# Patient Record
Sex: MALE | NOT HISPANIC OR LATINO | URBAN - METROPOLITAN AREA
[De-identification: names, ages, dates, MRNs, and addresses within clinical notes are randomized per-mention and may not be internally consistent; named-entity substitution may affect disease eponyms.]

---

## 2019-02-01 PROBLEM — Z00.00 ENCOUNTER FOR PREVENTIVE HEALTH EXAMINATION: Status: ACTIVE | Noted: 2019-02-01

## 2019-02-05 ENCOUNTER — EMERGENCY (EMERGENCY)
Facility: HOSPITAL | Age: 54
LOS: 1 days | Discharge: HOME | End: 2019-02-05
Attending: EMERGENCY MEDICINE | Admitting: EMERGENCY MEDICINE
Payer: MEDICAID

## 2019-02-05 VITALS
RESPIRATION RATE: 16 BRPM | HEART RATE: 72 BPM | SYSTOLIC BLOOD PRESSURE: 123 MMHG | TEMPERATURE: 97 F | DIASTOLIC BLOOD PRESSURE: 70 MMHG | OXYGEN SATURATION: 100 %

## 2019-02-05 PROCEDURE — 99283 EMERGENCY DEPT VISIT LOW MDM: CPT | Mod: 25

## 2019-02-05 RX ORDER — CYCLOBENZAPRINE HYDROCHLORIDE 10 MG/1
1 TABLET, FILM COATED ORAL
Qty: 9 | Refills: 0
Start: 2019-02-05 | End: 2019-02-07

## 2019-02-05 RX ORDER — ACETAMINOPHEN 500 MG
975 TABLET ORAL ONCE
Qty: 0 | Refills: 0 | Status: COMPLETED | OUTPATIENT
Start: 2019-02-05 | End: 2019-02-05

## 2019-02-05 RX ORDER — METHOCARBAMOL 500 MG/1
1500 TABLET, FILM COATED ORAL ONCE
Qty: 0 | Refills: 0 | Status: COMPLETED | OUTPATIENT
Start: 2019-02-05 | End: 2019-02-05

## 2019-02-05 RX ORDER — LIDOCAINE 4 G/100G
1 CREAM TOPICAL
Qty: 5 | Refills: 0
Start: 2019-02-05 | End: 2019-02-09

## 2019-02-05 RX ORDER — LIDOCAINE 4 G/100G
1 CREAM TOPICAL ONCE
Qty: 0 | Refills: 0 | Status: COMPLETED | OUTPATIENT
Start: 2019-02-05 | End: 2019-02-05

## 2019-02-05 RX ORDER — IBUPROFEN 200 MG
1 TABLET ORAL
Qty: 20 | Refills: 0
Start: 2019-02-05 | End: 2019-02-09

## 2019-02-05 RX ORDER — METHOCARBAMOL 500 MG/1
500 TABLET, FILM COATED ORAL ONCE
Qty: 0 | Refills: 0 | Status: DISCONTINUED | OUTPATIENT
Start: 2019-02-05 | End: 2019-02-05

## 2019-02-05 RX ORDER — ACETAMINOPHEN 500 MG
2 TABLET ORAL
Qty: 24 | Refills: 0
Start: 2019-02-05 | End: 2019-02-07

## 2019-02-05 RX ADMIN — LIDOCAINE 1 PATCH: 4 CREAM TOPICAL at 04:31

## 2019-02-05 RX ADMIN — METHOCARBAMOL 1500 MILLIGRAM(S): 500 TABLET, FILM COATED ORAL at 04:40

## 2019-02-05 RX ADMIN — Medication 975 MILLIGRAM(S): at 04:31

## 2019-02-05 NOTE — ED PROVIDER NOTE - OBJECTIVE STATEMENT
53m pmh chronic back pain, pw acute back pain since 7pm. patient reports working as  at [employer] and standing up from a seated position and throwing out his back. Reports back pain in the thoracic area since. Denies chest pain, shortness of breath, n/v/d/c urinary retention, incontinence, neck stiffness, history of cancer, ivda, abdominal pain, weakness in extremities. Reports taking 1 ibuprofen with mild relief. Patient with son, who was not in room at time of eval. reports numbness of right forearm chronic unchanged this evening, last bm/urine right before eval

## 2019-02-05 NOTE — ED ADULT NURSE NOTE - NSIMPLEMENTINTERV_GEN_ALL_ED
Implemented All Universal Safety Interventions:  [place] to call system. Call bell, personal items and telephone within reach. Instruct patient to call for assistance. Room bathroom lighting operational. Non-slip footwear when patient is off stretcher. Physically safe environment: no spills, clutter or unnecessary equipment. Stretcher in lowest position, wheels locked, appropriate side rails in place.

## 2019-02-05 NOTE — ED PROVIDER NOTE - PROGRESS NOTE DETAILS
Patient feels well now, tolerating PO. Discussed lab findings with patient. Patient feels comfortable going home. Gave home care and follow up instructions. Discussed which symptoms to look out for and when to return to the ED for further evaluation. Patient given opportunity to ask questions about their medical condition and had all questions answered. sending medications to pharmacy.

## 2019-02-05 NOTE — ED PROVIDER NOTE - NS ED ROS FT
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
## 2019-02-05 NOTE — ED PROVIDER NOTE - MEDICAL DECISION MAKING DETAILS
53m The patient presents with acute onset of back pain after standing today, I believe this patient can be ruled out for serious pathology given there is a completely normal neurological exam, no history of IV drug use, and no history of bowel or bladder incontinence. Analgesia, reassess

## 2019-02-08 ENCOUNTER — APPOINTMENT (OUTPATIENT)
Dept: ORTHOPEDIC SURGERY | Facility: CLINIC | Age: 54
End: 2019-02-08

## 2022-10-29 ENCOUNTER — INPATIENT (INPATIENT)
Facility: HOSPITAL | Age: 57
LOS: 7 days | Discharge: ANOTHER HOSPITAL | End: 2022-11-06
Attending: STUDENT IN AN ORGANIZED HEALTH CARE EDUCATION/TRAINING PROGRAM | Admitting: STUDENT IN AN ORGANIZED HEALTH CARE EDUCATION/TRAINING PROGRAM

## 2022-10-29 VITALS
SYSTOLIC BLOOD PRESSURE: 106 MMHG | DIASTOLIC BLOOD PRESSURE: 60 MMHG | TEMPERATURE: 98 F | RESPIRATION RATE: 18 BRPM | OXYGEN SATURATION: 100 % | HEART RATE: 66 BPM

## 2022-10-29 LAB
ALBUMIN SERPL ELPH-MCNC: 4.1 G/DL — SIGNIFICANT CHANGE UP (ref 3.3–5)
ALP SERPL-CCNC: 61 U/L — SIGNIFICANT CHANGE UP (ref 40–120)
ALT FLD-CCNC: 21 U/L — SIGNIFICANT CHANGE UP (ref 4–41)
ANION GAP SERPL CALC-SCNC: 9 MMOL/L — SIGNIFICANT CHANGE UP (ref 7–14)
APTT BLD: 30.1 SEC — SIGNIFICANT CHANGE UP (ref 27–36.3)
AST SERPL-CCNC: 23 U/L — SIGNIFICANT CHANGE UP (ref 4–40)
BASOPHILS # BLD AUTO: 0.04 K/UL — SIGNIFICANT CHANGE UP (ref 0–0.2)
BASOPHILS NFR BLD AUTO: 0.6 % — SIGNIFICANT CHANGE UP (ref 0–2)
BILIRUB SERPL-MCNC: 0.4 MG/DL — SIGNIFICANT CHANGE UP (ref 0.2–1.2)
BUN SERPL-MCNC: 16 MG/DL — SIGNIFICANT CHANGE UP (ref 7–23)
CALCIUM SERPL-MCNC: 9.3 MG/DL — SIGNIFICANT CHANGE UP (ref 8.4–10.5)
CHLORIDE SERPL-SCNC: 105 MMOL/L — SIGNIFICANT CHANGE UP (ref 98–107)
CO2 SERPL-SCNC: 26 MMOL/L — SIGNIFICANT CHANGE UP (ref 22–31)
CREAT SERPL-MCNC: 0.9 MG/DL — SIGNIFICANT CHANGE UP (ref 0.5–1.3)
CRP SERPL-MCNC: <3 MG/L — SIGNIFICANT CHANGE UP
EGFR: 100 ML/MIN/1.73M2 — SIGNIFICANT CHANGE UP
EOSINOPHIL # BLD AUTO: 0.15 K/UL — SIGNIFICANT CHANGE UP (ref 0–0.5)
EOSINOPHIL NFR BLD AUTO: 2.1 % — SIGNIFICANT CHANGE UP (ref 0–6)
ERYTHROCYTE [SEDIMENTATION RATE] IN BLOOD: 7 MM/HR — SIGNIFICANT CHANGE UP (ref 1–15)
FLUAV AG NPH QL: SIGNIFICANT CHANGE UP
FLUBV AG NPH QL: SIGNIFICANT CHANGE UP
GLUCOSE SERPL-MCNC: 90 MG/DL — SIGNIFICANT CHANGE UP (ref 70–99)
HCT VFR BLD CALC: 41.8 % — SIGNIFICANT CHANGE UP (ref 39–50)
HGB BLD-MCNC: 14 G/DL — SIGNIFICANT CHANGE UP (ref 13–17)
IANC: 3.55 K/UL — SIGNIFICANT CHANGE UP (ref 1.8–7.4)
IMM GRANULOCYTES NFR BLD AUTO: 0.6 % — SIGNIFICANT CHANGE UP (ref 0–0.9)
INR BLD: 1.04 RATIO — SIGNIFICANT CHANGE UP (ref 0.88–1.16)
LYMPHOCYTES # BLD AUTO: 2.69 K/UL — SIGNIFICANT CHANGE UP (ref 1–3.3)
LYMPHOCYTES # BLD AUTO: 37.5 % — SIGNIFICANT CHANGE UP (ref 13–44)
MAGNESIUM SERPL-MCNC: 2 MG/DL — SIGNIFICANT CHANGE UP (ref 1.6–2.6)
MCHC RBC-ENTMCNC: 28.2 PG — SIGNIFICANT CHANGE UP (ref 27–34)
MCHC RBC-ENTMCNC: 33.5 GM/DL — SIGNIFICANT CHANGE UP (ref 32–36)
MCV RBC AUTO: 84.1 FL — SIGNIFICANT CHANGE UP (ref 80–100)
MONOCYTES # BLD AUTO: 0.7 K/UL — SIGNIFICANT CHANGE UP (ref 0–0.9)
MONOCYTES NFR BLD AUTO: 9.8 % — SIGNIFICANT CHANGE UP (ref 2–14)
NEUTROPHILS # BLD AUTO: 3.55 K/UL — SIGNIFICANT CHANGE UP (ref 1.8–7.4)
NEUTROPHILS NFR BLD AUTO: 49.4 % — SIGNIFICANT CHANGE UP (ref 43–77)
NRBC # BLD: 0 /100 WBCS — SIGNIFICANT CHANGE UP (ref 0–0)
NRBC # FLD: 0 K/UL — SIGNIFICANT CHANGE UP (ref 0–0)
PHOSPHATE SERPL-MCNC: 3.2 MG/DL — SIGNIFICANT CHANGE UP (ref 2.5–4.5)
PLATELET # BLD AUTO: 254 K/UL — SIGNIFICANT CHANGE UP (ref 150–400)
POTASSIUM SERPL-MCNC: 4.7 MMOL/L — SIGNIFICANT CHANGE UP (ref 3.5–5.3)
POTASSIUM SERPL-SCNC: 4.7 MMOL/L — SIGNIFICANT CHANGE UP (ref 3.5–5.3)
PROT SERPL-MCNC: 6.9 G/DL — SIGNIFICANT CHANGE UP (ref 6–8.3)
PROTHROM AB SERPL-ACNC: 12.1 SEC — SIGNIFICANT CHANGE UP (ref 10.5–13.4)
RBC # BLD: 4.97 M/UL — SIGNIFICANT CHANGE UP (ref 4.2–5.8)
RBC # FLD: 14.6 % — HIGH (ref 10.3–14.5)
RSV RNA NPH QL NAA+NON-PROBE: SIGNIFICANT CHANGE UP
SARS-COV-2 RNA SPEC QL NAA+PROBE: SIGNIFICANT CHANGE UP
SODIUM SERPL-SCNC: 140 MMOL/L — SIGNIFICANT CHANGE UP (ref 135–145)
WBC # BLD: 7.17 K/UL — SIGNIFICANT CHANGE UP (ref 3.8–10.5)
WBC # FLD AUTO: 7.17 K/UL — SIGNIFICANT CHANGE UP (ref 3.8–10.5)

## 2022-10-29 PROCEDURE — 72128 CT CHEST SPINE W/O DYE: CPT | Mod: 26,MA

## 2022-10-29 PROCEDURE — 70450 CT HEAD/BRAIN W/O DYE: CPT | Mod: 26,MA

## 2022-10-29 PROCEDURE — 99285 EMERGENCY DEPT VISIT HI MDM: CPT

## 2022-10-29 PROCEDURE — 72131 CT LUMBAR SPINE W/O DYE: CPT | Mod: 26,MA

## 2022-10-29 PROCEDURE — 72125 CT NECK SPINE W/O DYE: CPT | Mod: 26,MA

## 2022-10-29 RX ORDER — DIAZEPAM 5 MG
5 TABLET ORAL ONCE
Refills: 0 | Status: DISCONTINUED | OUTPATIENT
Start: 2022-10-29 | End: 2022-10-29

## 2022-10-29 NOTE — ED PROVIDER NOTE - PHYSICAL EXAMINATION
GENERAL: well appearing in no acute distress, non-toxic appearing  HEENT: normal conjunctiva, oral mucosa moist, uvula midline, no tonsilar exudates, +left facial droop  CARDIAC: regular rate and rhythm, normal S1S2, no appreciable murmurs, 2+ pulses in UE/LE b/l  PULM: normal breath sounds, clear to ascultation bilaterally, no rales, rhonchi, wheezing  GI: Abd soft, nondistended, nontender, no rebound tenderness, no guarding, no rigidity  NEURO: No focal sensory deficits, possible R sided 4/5 strength L 5/5, uncbalanced gait, dragging left foot., AAOx3  MSK: ROM intact, no peripheral edema, no calf tenderness b/l  SKIN: well-perfused, extremities warm, no visible rashes  PSYCH: appropriate mood and affect

## 2022-10-29 NOTE — ED ADULT NURSE REASSESSMENT NOTE - NS ED NURSE REASSESS COMMENT FT1
Pt continues to refuse MRI and Valium medication. Respirations even and unlabored. No apparent distress, appears laying in bed with HOB elevated. NSR on bedside cardiac monitor. Bed in lowest position, call bell in reach, wheels locked, side rails up, safety maintained. Awaiting further orders.

## 2022-10-29 NOTE — ED ADULT NURSE NOTE - OBJECTIVE STATEMENT
Break coverage RN: 58 yo male A&Ox4, ambulatory with PHX: R shoulder surgery S/P workplace injury (fall in 2019) C/O left sided weakness/numbness/tingling x 3 days. PT states symptoms are traumatic, noticed 3 days ago having tingling sensation to left hand/fingers with weakness in LUE; also C/O difficulty ambulating, with left foot "feeling like it isn't there". Denies any pain, SOB, falls, injuries, trauma. PT appears to have left sided facial droop, left UE sensory deficit, B/L LE weakness. Awaiting MD evaluation at this time and further orders. Call bell within reach, comfort measures provided. ELLIE Medina R.N.
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
## 2022-10-29 NOTE — ED ADULT NURSE NOTE - NS ED NURSE PATIENT LEFT UNIT TIME
Mid-Level Procedure Text (A): After obtaining clear surgical margins the patient was sent to a mid-level provider for surgical repair.  The patient understands they will receive post-surgical care and follow-up from the mid-level provider. 01:06

## 2022-10-29 NOTE — CONSULT NOTE ADULT - ASSESSMENT
INCOMPLETE      Impression: Right side weakness, residual from prior. Left side strength appears intact on physical exam. Patient does have difficulty with gait, which he states is new for him.     Recommend:  [] consider MRI T- spine with extension down to conus medullaris if patient can tolerate to rule out spinal etiology for patient gait changes. Patient also complaining of midline spinal TTP in thoracic area.  Likely no need to image below conus and patient not presenting with new pain symptoms in legs and not complaining of urinary symptoms or saddle anesthesia.  [] CBC, CMP, Mg, Ph, TSH, T3, Free T4, Vitamins B1, B6, B9, B12, D3, Vit E, copper, zinc, CK, ESR, CRP, syphilis, lyme  [] outpatient EMG/NC studies with outpatient neurology   58 yo M, with Hx of chronic back and neck pain from spine injury 2019, R shoulder surgery after a workplace injury due to a fall in 2019, (Feb 2019: was working as a  in [employer], stood up from seated position, and threw out his back, has had pain in thoracic back area radiating down right back and right groin to right leg with lightening-like pain since then; also reported numbness of R forearm, chronic.)  Patient reports he has been having new onset numbness in the Left hand from the wrist to fingers, since 3 days. He reports loss of sensation in his fingers when trying to hold something or zipping up pants for example. He is complaining of Left foot numbness and paresthesias from the ankle down. He states he is dragging his left foot/left foot is slapping the ground, or not lifting enough and stamping the ground too hard. He endorses injury to his spine in 2019, with residual R side weakness; His right hand cannot  objects properly and drops things. He has contractures in the R hand.     Impression: Right side weakness, residual from prior. Left side strength appears intact on physical exam. Patient denies pain in the LUE and LLE, does endorse numbness and tingling. LUE seems to be intact; and RUE deficits seem to be chronic. Patient does have difficulty with gait, which he states is new for him, possibly L foot drop due to peroneal nerve injury/compression. Other differentials for distal sensory disturbance include metabolic causes such as vitamin deficiencies or diabetes.    Recommend:  [] consider MRI T- spine with extension down to conus medullaris if patient can tolerate to rule out spinal etiology for patient gait changes. Patient also complaining of midline spinal TTP in thoracic area.  Likely no need to image below conus and patient not presenting with new pain symptoms in legs and not complaining of urinary symptoms or saddle anesthesia, so less likely to involve cauda equine.  [] CBC, CMP, Mg, Ph, TSH, T3, Free T4, A1c, LDL, Vitamins B1, B6, B9, B12, D3, Vit E, copper, zinc, CK, ESR, CRP, syphilis, lyme  [] outpatient EMG/NCS studies with outpatient neurology  [] PT/OT evaluation    Patient to be seen during AM neurology rounds.  Recommendations finalized once attested by attending.

## 2022-10-29 NOTE — ED PROVIDER NOTE - ATTENDING CONTRIBUTION TO CARE
The patient is a 57y Male who has no pertinent past medical history PTED with Pt with HX of back/spine  injury 2019.  Pt reports over the past 3 days developed tingling to left hand and foot with weakness to left leg  states has to drag it. pt denies any  urinary incontinence or pain. 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

## 2022-10-29 NOTE — ED PROVIDER NOTE - OBJECTIVE STATEMENT
57M w/ h/o of chronic neck and back pain s/p spine + R shoulder surgery 2019, presents with 3 days of LLE weakness, LUE numbness, Left facial droop, left sided headache. Pt felt weak 3 days ago, could not stand or balance well, and when walking would drag his left foot. In his left hand his fingertips are numb, decribes difficulty zippering his pants. Headache is left sided with dull character, and assoc left facial droop. Denies any trauma, fever/chills, incontinence, saddle anesthesia, dizziness, CP, SOB.

## 2022-10-29 NOTE — ED ADULT NURSE REASSESSMENT NOTE - NS ED NURSE REASSESS COMMENT FT1
pt lying comfortably in bed at this time. respirations even and unlabored, completing full sentences. pt states "I have claustrophobia and could not do MRIs in the past, it feels tight." MD ordered 5mg valium, pt refused.

## 2022-10-29 NOTE — ED PROVIDER NOTE - CLINICAL SUMMARY MEDICAL DECISION MAKING FREE TEXT BOX
57M w/ h/o of chronic neck and back pain s/p spine + R shoulder surgery 2019, presents with 3 days of LLE weakness, LUE numbness, Left facial droop, left sided headache. Denies trauma, fall, fever/chill, incontinence, saddle anesthesia. AVSS, neuro exam per PE. brain mets vs spinal lesion vs metabolic,  will get labs, UA, CT, +/- MRI, Neuro, give meds and reassess.

## 2022-10-29 NOTE — ED ADULT NURSE NOTE - INTERVENTIONS DEFINITIONS
[place] to call system/Call bell, personal items and telephone within reach/Instruct patient to call for assistance/Non-slip footwear when patient is off stretcher/Physically safe environment: no spills, clutter or unnecessary equipment/Stretcher in lowest position, wheels locked, appropriate side rails in place/Provide visual clues: red socks

## 2022-10-29 NOTE — ED ADULT NURSE NOTE - NS ED NOTE ABUSE RESPONSE YN
Sumi Zuniga from estimate department  With [place]  Patient is requesting estimate.    Sumi Zuniga needs to confirm the correct code for photo workman [id number] or [id number]. [phone]    Routed to Rosemarie's team Yes

## 2022-10-29 NOTE — ED ADULT NURSE REASSESSMENT NOTE - NS ED NURSE REASSESS COMMENT FT1
Report received from day RN Keshia. A&Ox4 and ambulatory. C/o left sided neck discomfort. MD resident Thornton made aware. Respirations even and unlabored. No apparent distress noted, pt appears laying in bed with HOB elevated. Denies CP, SOB, vision changes, dizziness, lightheadedness, nausea, vomiting, fever or chills. Speaking in full and complete sentences. Right sided arm and leg weakness noted, pt states this is baseline since 2019 spinal injury. No facial droop noted. NSR on bedside cardiac monitor. Pt refusing MRI due to fear of claustrophobia. MD Thornton made aware. Bed in lowest position, call bell in reach, wheels locked, side rails up, safety maintained. Awaiting further orders. Report received from day RN Keshia. A&Ox4 and ambulatory. C/o left sided neck discomfort. MD resident Thornton made aware. Respirations even and unlabored. No apparent distress noted, pt appears laying in bed with HOB elevated. Denies CP, SOB, vision changes, dizziness, lightheadedness, nausea, vomiting, fever or chills. Speaking in full and complete sentences. Right sided arm and leg weakness noted, pt states this is baseline since 2019 spinal injury. No facial droop noted. NSR on bedside cardiac monitor. Pt refusing MRI due to fear of claustrophobia, also refusing medication. MD Thornton made aware. Bed in lowest position, call bell in reach, wheels locked, side rails up, safety maintained. Awaiting further orders.

## 2022-10-29 NOTE — ED ADULT NURSE REASSESSMENT NOTE - NS ED NURSE REASSESS COMMENT FT1
report received from [facility] coverage RN. respirations even and unlabored, pt at baseline mental status. assisted pt in standing up to use urinal. pt steady when standing. sitting in bed comfortably, no complaints at this time. NSR on cardiac monitor. awaiting CT.

## 2022-10-29 NOTE — ED ADULT NURSE REASSESSMENT NOTE - NS ED NURSE REASSESS COMMENT FT1
Pt appears comfortable laying in bed with HOB elevated. No complaints. No apparent distress noted. Respirations even and unlabored. Pt speaking in full and complete sentences. NSR on bedside cardiac monitor. V/S charted. Pt continues to refuse MRI. Bed in lowest position, call bell in reach, wheels locked, side rails up, safety maintained. Awaiting further orders.

## 2022-10-29 NOTE — ED ADULT TRIAGE NOTE - CHIEF COMPLAINT QUOTE
Pt with HX of back/spine  injury 2019.  Pt reports over the past 3 days developed tingling to left hand and foot with weakness to left leg  states has to drag it. pt denies any  urinary incontinence or pain. .

## 2022-10-30 DIAGNOSIS — M21.372 FOOT DROP, LEFT FOOT: ICD-10-CM

## 2022-10-30 DIAGNOSIS — R20.0 ANESTHESIA OF SKIN: ICD-10-CM

## 2022-10-30 DIAGNOSIS — M54.9 DORSALGIA, UNSPECIFIED: ICD-10-CM

## 2022-10-30 DIAGNOSIS — Z29.9 ENCOUNTER FOR PROPHYLACTIC MEASURES, UNSPECIFIED: ICD-10-CM

## 2022-10-30 LAB
A1C WITH ESTIMATED AVERAGE GLUCOSE RESULT: 5.3 % — SIGNIFICANT CHANGE UP (ref 4–5.6)
ANION GAP SERPL CALC-SCNC: 12 MMOL/L — SIGNIFICANT CHANGE UP (ref 7–14)
BUN SERPL-MCNC: 19 MG/DL — SIGNIFICANT CHANGE UP (ref 7–23)
CALCIUM SERPL-MCNC: 9.2 MG/DL — SIGNIFICANT CHANGE UP (ref 8.4–10.5)
CHLORIDE SERPL-SCNC: 102 MMOL/L — SIGNIFICANT CHANGE UP (ref 98–107)
CK SERPL-CCNC: 259 U/L — HIGH (ref 30–200)
CO2 SERPL-SCNC: 22 MMOL/L — SIGNIFICANT CHANGE UP (ref 22–31)
CREAT SERPL-MCNC: 0.86 MG/DL — SIGNIFICANT CHANGE UP (ref 0.5–1.3)
EGFR: 101 ML/MIN/1.73M2 — SIGNIFICANT CHANGE UP
ESTIMATED AVERAGE GLUCOSE: 105 — SIGNIFICANT CHANGE UP
FOLATE SERPL-MCNC: 12 NG/ML — SIGNIFICANT CHANGE UP (ref 3.1–17.5)
GLUCOSE SERPL-MCNC: 91 MG/DL — SIGNIFICANT CHANGE UP (ref 70–99)
HCT VFR BLD CALC: 44.1 % — SIGNIFICANT CHANGE UP (ref 39–50)
HCV AB S/CO SERPL IA: 0.14 S/CO — SIGNIFICANT CHANGE UP (ref 0–0.99)
HCV AB SERPL-IMP: SIGNIFICANT CHANGE UP
HGB BLD-MCNC: 14.9 G/DL — SIGNIFICANT CHANGE UP (ref 13–17)
LDLC SERPL DIRECT ASSAY-MCNC: 154 MG/DL — SIGNIFICANT CHANGE UP (ref 73–160)
MCHC RBC-ENTMCNC: 28.6 PG — SIGNIFICANT CHANGE UP (ref 27–34)
MCHC RBC-ENTMCNC: 33.8 GM/DL — SIGNIFICANT CHANGE UP (ref 32–36)
MCV RBC AUTO: 84.6 FL — SIGNIFICANT CHANGE UP (ref 80–100)
NRBC # BLD: 0 /100 WBCS — SIGNIFICANT CHANGE UP (ref 0–0)
NRBC # FLD: 0 K/UL — SIGNIFICANT CHANGE UP (ref 0–0)
PLATELET # BLD AUTO: 245 K/UL — SIGNIFICANT CHANGE UP (ref 150–400)
POTASSIUM SERPL-MCNC: 4 MMOL/L — SIGNIFICANT CHANGE UP (ref 3.5–5.3)
POTASSIUM SERPL-SCNC: 4 MMOL/L — SIGNIFICANT CHANGE UP (ref 3.5–5.3)
RBC # BLD: 5.21 M/UL — SIGNIFICANT CHANGE UP (ref 4.2–5.8)
RBC # FLD: 14.6 % — HIGH (ref 10.3–14.5)
SODIUM SERPL-SCNC: 136 MMOL/L — SIGNIFICANT CHANGE UP (ref 135–145)
T4 AB SER-ACNC: 7.65 UG/DL — SIGNIFICANT CHANGE UP (ref 5.1–13)
TSH SERPL-MCNC: 3.93 UIU/ML — SIGNIFICANT CHANGE UP (ref 0.27–4.2)
VIT B12 SERPL-MCNC: 342 PG/ML — SIGNIFICANT CHANGE UP (ref 200–900)
WBC # BLD: 8.13 K/UL — SIGNIFICANT CHANGE UP (ref 3.8–10.5)
WBC # FLD AUTO: 8.13 K/UL — SIGNIFICANT CHANGE UP (ref 3.8–10.5)

## 2022-10-30 PROCEDURE — 99223 1ST HOSP IP/OBS HIGH 75: CPT

## 2022-10-30 PROCEDURE — 99255 IP/OBS CONSLTJ NEW/EST HI 80: CPT

## 2022-10-30 RX ORDER — ENOXAPARIN SODIUM 100 MG/ML
40 INJECTION SUBCUTANEOUS EVERY 24 HOURS
Refills: 0 | Status: DISCONTINUED | OUTPATIENT
Start: 2022-10-30 | End: 2022-11-06

## 2022-10-30 RX ORDER — CYCLOBENZAPRINE HYDROCHLORIDE 10 MG/1
5 TABLET, FILM COATED ORAL THREE TIMES A DAY
Refills: 0 | Status: DISCONTINUED | OUTPATIENT
Start: 2022-10-30 | End: 2022-11-02

## 2022-10-30 RX ADMIN — ENOXAPARIN SODIUM 40 MILLIGRAM(S): 100 INJECTION SUBCUTANEOUS at 05:11

## 2022-10-30 NOTE — PROGRESS NOTE ADULT - ASSESSMENT
57 year old male with hx of chronic back pain and right sided hand/wrist contracture after a work related injury presenting to the hospital for paresthesias of the left hand/fingers/foot with related left foot drop and difficulty ambulating, found to have cervical and lumbar degenerative disc disease on CT scan.  57 year old male with hx of chronic back pain and right sided hand/wrist contracture after a work related injury presenting to the hospital for paresthesias of the left hand/fingers/foot with related left foot drop and difficulty ambulating, found to have cervical and lumbar degenerative disc disease on CT scan. Pending MRI, neuropathy workup per neuro, PT/OT eval.
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
## 2022-10-30 NOTE — H&P ADULT - ATTENDING COMMENTS
57 year old male with hx of chronic back pain and right sided hand/wrist contracture after a work related injury presenting to the hospital for paresthesias of the left hand/fingers/foot with related left foot drop and difficulty ambulating, found to have cervical and lumbar degenerative disc disease on CT scan. Await spinal MRI (pt willingness to undergo still in question) to better evaluate acute neuropathy. PT, fall precautions, ambulate with assistance 57 year old male with hx of chronic back pain and right sided hand/wrist contracture after a work related injury presenting to the hospital for paresthesias of the left hand/fingers/foot with related left foot drop and difficulty ambulating, found to have cervical and lumbar degenerative disc disease on CT scan. Await spinal MRI (pt willingness to undergo still in question) to better evaluate acute neuropathy. PT/OT, fall precautions, ambulate with assistance

## 2022-10-30 NOTE — H&P ADULT - NSHPPHYSICALEXAM_GEN_ALL_CORE
PHYSICAL EXAM:  Vital Signs Last 24 Hrs  T(C): 36.9 (10-30-22 @ 00:08)  T(F): 98.4 (10-30-22 @ 00:08), Max: 98.5 (10-29-22 @ 19:42)  HR: 62 (10-30-22 @ 00:08) (62 - 77)  BP: 111/74 (10-30-22 @ 00:08)  BP(mean): 87 (10-30-22 @ 00:08) (87 - 87)  RR: 18 (10-30-22 @ 00:08) (14 - 19)  SpO2: 100% (10-30-22 @ 00:08) (100% - 100%)  Wt(kg): --    Constitutional: NAD, awake and alert  EYES: EOMI  ENT:  Normal Hearing, no tonsillar exudates   Neck: Soft and supple , no thyromegaly   Respiratory: Breath sounds are clear bilaterally, No wheezing, rales or rhonchi  Cardiovascular: S1 and S2, regular rate and rhythm, no Murmurs, gallops or rubs, no JVD,    Gastrointestinal: Bowel Sounds present, soft, nontender, nondistended, no guarding, no rebound  Extremities: No cyanosis or clubbing; warm to touch  Vascular: 2+ peripheral pulses lower ex  Neurological: No focal deficits, CN II-XII intact bilaterally, sensation to light touch intact in all extremities, gait intact. Pupils are equally reactive to light and symmetrical in size.   Musculoskeletal: 5/5 strength b/l upper and lower extremities; no joint swelling.  Skin: No rashes  Psych: no depression or anhedonia, AAOx3  HEME: no bruises, no nose bleeds PHYSICAL EXAM:  Vital Signs Last 24 Hrs  T(C): 36.9 (10-30-22 @ 00:08)  T(F): 98.4 (10-30-22 @ 00:08), Max: 98.5 (10-29-22 @ 19:42)  HR: 62 (10-30-22 @ 00:08) (62 - 77)  BP: 111/74 (10-30-22 @ 00:08)  BP(mean): 87 (10-30-22 @ 00:08) (87 - 87)  RR: 18 (10-30-22 @ 00:08) (14 - 19)  SpO2: 100% (10-30-22 @ 00:08) (100% - 100%)  Wt(kg): --    Constitutional: NAD, awake and alert  EYES: EOMI  ENT:  Normal Hearing, no tonsillar exudates   Neck: Soft and supple , no thyromegaly   Respiratory: Breath sounds are clear bilaterally, No wheezing, rales or rhonchi  Cardiovascular: S1 and S2, regular rate and rhythm, no Murmurs, gallops or rubs, no JVD,    Gastrointestinal: Bowel Sounds present, soft, nontender, nondistended, no guarding, no rebound  Extremities: No cyanosis or clubbing; warm to touch  Vascular: 2+ peripheral pulses lower ex  Neurological: patient with right wrist contracture as well as contracture of fingers, no other focal neurological deficits. there is decreased sensation to gross touch of patients digits 2 and 3 on the left ventral side.   Musculoskeletal: patient has intact strength of bilateral lower extremity plantar/dorsifelxion as well as eversion and inversion of bilateral ankle joints.   Skin: No rashes  Psych: no depression or anhedonia, AAOx3  HEME: no bruises, no nose bleeds

## 2022-10-30 NOTE — PROGRESS NOTE ADULT - PROBLEM SELECTOR PLAN 1
-possibly due to peripheral nerve disease, spinal cord compression/radiculopathy, transverse myelitis, peroneal nerve injury (would affect dorsiflexion and foot eversion), autoimmune diseases like ALS, Muscular dystrophy, etc   -appreciate neurology recommendations; workup for vitamin deficiencies, lyme, syphyllis, thyroid disease, diabetes, HLD sent   -ESR/CRP WNL   -imaging with MR head, C/T/L spine ordered   -patient will need outpatient EMG testing   -fall precautions, ambulate with assistance  -PT/OT eval to determine therapy needs/disposition 10/30 AM: 5/5 dorsi/plantarflexion b/l on exam  -possibly due to peripheral nerve disease, spinal cord compression/radiculopathy, transverse myelitis, peroneal nerve injury (would affect dorsiflexion and foot eversion), autoimmune diseases like ALS, Muscular dystrophy, etc   -appreciate neurology recommendations; workup for vitamin deficiencies, lyme, syphyllis, thyroid disease, diabetes, HLD sent   -ESR/CRP WNL   -imaging with MR head, C/T/L spine ordered   -patient will need outpatient EMG testing   -fall precautions, ambulate with assistance  -PT/OT eval to determine therapy needs/disposition

## 2022-10-30 NOTE — OCCUPATIONAL THERAPY INITIAL EVALUATION ADULT - PLANNED THERAPY INTERVENTIONS, OT EVAL
ADL retraining/balance training/bed mobility training/strengthening/transfer training ADL retraining/balance training/bed mobility training/fine motor coordination training/motor coordination training/neuromuscular re-education/ROM/strengthening/transfer training

## 2022-10-30 NOTE — PROGRESS NOTE ADULT - PROBLEM SELECTOR PLAN 4
-DVT ppx with lovenox   -regular diet   -PT/OT eval prior to discharge -DVT ppx with lovenox   -regular diet   -Dispo: pending further workup and PT/OT eval

## 2022-10-30 NOTE — PROGRESS NOTE ADULT - SUBJECTIVE AND OBJECTIVE BOX
Interval hx:    MEDICATIONS  (STANDING):  enoxaparin Injectable 40 milliGRAM(s) SubCutaneous every 24 hours    MEDICATIONS  (PRN):    Vital Signs (24 Hrs):  T(C): 36.6 (10-30-22 @ 05:13), Max: 36.9 (10-29-22 @ 19:42)  HR: 78 (10-30-22 @ 05:13) (62 - 78)  BP: 108/68 (10-30-22 @ 05:13) (102/73 - 116/82)  RR: 16 (10-30-22 @ 05:13) (14 - 19)  SpO2: 100% (10-30-22 @ 05:13) (100% - 100%)  Wt(kg): --  Daily Height in cm: 157.48 (30 Oct 2022 01:30)    Daily     I&O's Summary    Vital Signs (24 Hrs):  T(C): 36.6 (10-30-22 @ 05:13), Max: 36.9 (10-29-22 @ 19:42)  HR: 78 (10-30-22 @ 05:13) (62 - 78)  BP: 108/68 (10-30-22 @ 05:13) (102/73 - 116/82)  RR: 16 (10-30-22 @ 05:13) (14 - 19)  SpO2: 100% (10-30-22 @ 05:13) (100% - 100%)  Wt(kg): --  Daily Height in cm: 157.48 (30 Oct 2022 01:30)    Daily     I&O's Summary    Physical Exam: Interval hx:    MEDICATIONS  (STANDING):  enoxaparin Injectable 40 milliGRAM(s) SubCutaneous every 24 hours    MEDICATIONS  (PRN):    Vital Signs (24 Hrs):  T(C): 36.6 (10-30-22 @ 05:13), Max: 36.9 (10-29-22 @ 19:42)  HR: 78 (10-30-22 @ 05:13) (62 - 78)  BP: 108/68 (10-30-22 @ 05:13) (102/73 - 116/82)  RR: 16 (10-30-22 @ 05:13) (14 - 19)  SpO2: 100% (10-30-22 @ 05:13) (100% - 100%)  Wt(kg): --  Daily Height in cm: 157.48 (30 Oct 2022 01:30)    Daily     I&O's Summary    Vital Signs (24 Hrs):  T(C): 36.6 (10-30-22 @ 05:13), Max: 36.9 (10-29-22 @ 19:42)  HR: 78 (10-30-22 @ 05:13) (62 - 78)  BP: 108/68 (10-30-22 @ 05:13) (102/73 - 116/82)  RR: 16 (10-30-22 @ 05:13) (14 - 19)  SpO2: 100% (10-30-22 @ 05:13) (100% - 100%)  Wt(kg): --  Daily Height in cm: 157.48 (30 Oct 2022 01:30)    Daily     I&O's Summary    Physical Exam:  Constitutional: NAD, awake and alert  Respiratory: Breath sounds are clear bilaterally, No wheezing, rales or rhonchi  Cardiovascular: S1 and S2, regular rate and rhythm, no Murmurs, gallops or rubs, no JVD,    Gastrointestinal: Bowel Sounds present, soft, nontender, nondistended, no guarding, no rebound  Extremities: No LE edema b/l  Vascular: 2+ peripheral pulses lower ex  Neurological: AAOx3, PERRLA, EOMI, 5/5 dorsi/plantarflexion b/l, decreased  strength and 4/5 str arm extension on R side, normal  str and arm str on L side, FTN normal  Musculoskeletal: CLAROS x 4  Skin: No cyanosis or pallor    LABS:  10-30 @ 06:30 Creatine 259 U/L<H> [30 - 200]  cret                        14.9   8.13  )-----------( 245      ( 30 Oct 2022 06:30 )             44.1     10-30    136  |  102  |  19  ----------------------------<  91  4.0   |  22  |  0.86    Ca    9.2      30 Oct 2022 06:30  Phos  3.2     10-29  Mg     2.00     10-29    TPro  6.9  /  Alb  4.1  /  TBili  0.4  /  DBili  x   /  AST  23  /  ALT  21  /  AlkPhos  61  10-29    PT/INR - ( 29 Oct 2022 14:29 )   PT: 12.1 sec;   INR: 1.04 ratio       PTT - ( 29 Oct 2022 14:29 )  PTT:30.1 sec   Interval hx: No acute events overnight    MEDICATIONS  (STANDING):  enoxaparin Injectable 40 milliGRAM(s) SubCutaneous every 24 hours    MEDICATIONS  (PRN):    Vital Signs (24 Hrs):  T(C): 36.6 (10-30-22 @ 05:13), Max: 36.9 (10-29-22 @ 19:42)  HR: 78 (10-30-22 @ 05:13) (62 - 78)  BP: 108/68 (10-30-22 @ 05:13) (102/73 - 116/82)  RR: 16 (10-30-22 @ 05:13) (14 - 19)  SpO2: 100% (10-30-22 @ 05:13) (100% - 100%)  Wt(kg): --  Daily Height in cm: 157.48 (30 Oct 2022 01:30)    Daily     I&O's Summary    Vital Signs (24 Hrs):  T(C): 36.6 (10-30-22 @ 05:13), Max: 36.9 (10-29-22 @ 19:42)  HR: 78 (10-30-22 @ 05:13) (62 - 78)  BP: 108/68 (10-30-22 @ 05:13) (102/73 - 116/82)  RR: 16 (10-30-22 @ 05:13) (14 - 19)  SpO2: 100% (10-30-22 @ 05:13) (100% - 100%)  Wt(kg): --  Daily Height in cm: 157.48 (30 Oct 2022 01:30)    Daily     I&O's Summary    Physical Exam:  Constitutional: NAD, awake and alert  Respiratory: Breath sounds are clear bilaterally, No wheezing, rales or rhonchi  Cardiovascular: S1 and S2, regular rate and rhythm, no Murmurs, gallops or rubs, no JVD,    Gastrointestinal: Bowel Sounds present, soft, nontender, nondistended, no guarding, no rebound  Extremities: No LE edema b/l  Vascular: 2+ peripheral pulses lower ex  Neurological: AAOx3, PERRLA, EOMI, 5/5 dorsi/plantarflexion b/l, decreased  strength and 4/5 str arm extension on R side, normal  str and arm str on L side, FTN normal  Musculoskeletal: CLAROS x 4  Skin: No cyanosis or pallor    LABS:  10-30 @ 06:30 Creatine 259 U/L<H> [30 - 200]  cret                        14.9   8.13  )-----------( 245      ( 30 Oct 2022 06:30 )             44.1     10-30    136  |  102  |  19  ----------------------------<  91  4.0   |  22  |  0.86    Ca    9.2      30 Oct 2022 06:30  Phos  3.2     10-29  Mg     2.00     10-29    TPro  6.9  /  Alb  4.1  /  TBili  0.4  /  DBili  x   /  AST  23  /  ALT  21  /  AlkPhos  61  10-29    PT/INR - ( 29 Oct 2022 14:29 )   PT: 12.1 sec;   INR: 1.04 ratio       PTT - ( 29 Oct 2022 14:29 )  PTT:30.1 sec

## 2022-10-30 NOTE — PROGRESS NOTE ADULT - PROBLEM SELECTOR PLAN 2
-paresthesias of left hand, foot.   -ddx includes disease of the spinal nerve root (radiculopathy), disease of the spinal cord (myelopathy), disease of the peripheral nerves (neuropathy), vitamin deficiencies, autoimmune disorders, infections   -workup per neuro as above   -MR imaging of head, C/T/L spine ordered, although patient will think about it and might refuse   -comprehensive metabolic panel and complete blood count panel all WNL -10/30 AM: pt reports continued paresthesias of left hand and foot  -paresthesias of left hand, foot.   -ddx includes disease of the spinal nerve root (radiculopathy), disease of the spinal cord (myelopathy), disease of the peripheral nerves (neuropathy), vitamin deficiencies, autoimmune disorders, infections   -workup per neuro as above   -MR imaging of head, C/T/L spine ordered, although patient will think about it and might refuse   -comprehensive metabolic panel and complete blood count panel all WNL

## 2022-10-30 NOTE — ED ADULT NURSE REASSESSMENT NOTE - NS ED NURSE REASSESS COMMENT FT1
Pt appears comfortable in stretcher. No apparent distress noted. Skin is warm to touch. resp even and unlabored. Speaking in full and complete sentences. Pulses palpable in all extremities. Weakness noted in right upper arm strength. Equal strength and sensation in all other extremities. Safety maintained.

## 2022-10-30 NOTE — PATIENT PROFILE ADULT - FALL HARM RISK - RISK INTERVENTIONS
Assistance OOB with selected safe patient handling equipment/Assistance with ambulation/Communicate Fall Risk and Risk Factors to all staff, patient, and family/Discuss with provider need for PT consult/Monitor gait and stability/Reinforce activity limits and safety measures with patient and family/Visual Cue: Yellow wristband/Bed in lowest position, wheels locked, appropriate side rails in place/Call bell, personal items and telephone in reach/Instruct patient to call for assistance before getting out of bed or chair/Non-slip footwear when patient is out of bed/[place] to call system/Physically safe environment - no spills, clutter or unnecessary equipment/Purposeful Proactive Rounding/Room/bathroom lighting operational, light cord in reach

## 2022-10-30 NOTE — PHYSICAL THERAPY INITIAL EVALUATION ADULT - LEVEL OF INDEPENDENCE: STAIR NEGOTIATION, REHAB EVAL
deferred at this time 2/2 decreased coordination Mart-1 - Positive Histology Text: MART-1 staining demonstrates areas of higher density and clustering of melanocytes with Pagetoid spread upwards within the epidermis. The surgical margins are positive for tumor cells.

## 2022-10-30 NOTE — H&P ADULT - NSICDXFAMILYHX_GEN_ALL_CORE_FT
FAMILY HISTORY:  Father  Still living? Unknown  No significant family history, Age at diagnosis: Age Unknown    Mother  Still living? Unknown  No significant family history, Age at diagnosis: Age Unknown

## 2022-10-30 NOTE — H&P ADULT - NSICDXNOPASTSURGICALHX_GEN_ALL_CORE
<-- Click to add NO significant Past Surgical History Patient/Caregiver requests family/friend to interpret.

## 2022-10-30 NOTE — H&P ADULT - NSHPSOCIALHISTORY_GEN_ALL_CORE
works as security at [employer]   Independent in ADLs   lives at home with wife, is    no tobacco or illicit substance use, will drink alcohol occasionally

## 2022-10-30 NOTE — H&P ADULT - ASSESSMENT
57 year old male with hx of chronic back pain and right sided hand/wrist contracture after a work related injury presenting to the hospital for paresthesias of the left hand/fingers/foot with related left foot drop and difficulty ambulating, found to have cervical and lumbar degenerative disc disease on CT scan.

## 2022-10-30 NOTE — PHYSICAL THERAPY INITIAL EVALUATION ADULT - MANUAL MUSCLE TESTING RESULTS, REHAB EVAL
Bilateral UE muscle strength grossly 3/5 Throughout; Bilateral LE muscle strength grossly 3/5 Throughout./grossly assessed due to

## 2022-10-30 NOTE — H&P ADULT - NSHPLABSRESULTS_GEN_ALL_CORE
Personally reviewed imaging, labs, EKG  LABS:                        14.0   7.17  )-----------( 254      ( 29 Oct 2022 14:29 )             41.8     10-29    140  |  105  |  16  ----------------------------<  90  4.7   |  26  |  0.90    Ca    9.3      29 Oct 2022 14:29  Phos  3.2     10-29  Mg     2.00     10-29    TPro  6.9  /  Alb  4.1  /  TBili  0.4  /  DBili  x   /  AST  23  /  ALT  21  /  AlkPhos  61  10-29    PT/INR - ( 29 Oct 2022 14:29 )   PT: 12.1 sec;   INR: 1.04 ratio         PTT - ( 29 Oct 2022 14:29 )  PTT:30.1 sec          RADIOLOGY & ADDITIONAL TESTS:    Imaging Personally Reviewed:    Consultant(s) Notes Reviewed:      Care Discussed with Consultants/Other Providers: Personally reviewed imaging, labs, EKG  LABS:                        14.0   7.17  )-----------( 254      ( 29 Oct 2022 14:29 )             41.8     10-29    140  |  105  |  16  ----------------------------<  90  4.7   |  26  |  0.90    Ca    9.3      29 Oct 2022 14:29  Phos  3.2     10-29  Mg     2.00     10-29    TPro  6.9  /  Alb  4.1  /  TBili  0.4  /  DBili  x   /  AST  23  /  ALT  21  /  AlkPhos  61  10-29    PT/INR - ( 29 Oct 2022 14:29 )   PT: 12.1 sec;   INR: 1.04 ratio         PTT - ( 29 Oct 2022 14:29 )  PTT:30.1 sec          RADIOLOGY & ADDITIONAL TESTS:    Imaging Personally Reviewed:  There is discogenic degenerative disease and facet arthropathy of the   lumbar spine, most pronounced at L4-L5 where there is moderate bilateral   neural foraminal narrowing and mild central canal narrowing. There is   also mild bilateral neural foraminal narrowing at L5-S1    There is no acute fracture or subluxation of the cervical spine. There is   minimal retrolisthesis at C5-C6. There is straightening of the normal   cervical lordosis. The vertebral bodies are of normal height and   configuration. There is no evidence of prevertebral soft tissue swelling.    There is multilevel discogenic degenerative disease and facet arthropathy   of the cervical spine, most pronounced at C5-C6 where there is moderate   central canal narrowing and mild bilateral neural foraminal narrowing.    Consultant(s) Notes Reviewed:  [x] Y, neurology     EKG: sinus bradycardia (HR 58).  QTC 414ms

## 2022-10-30 NOTE — PHYSICAL THERAPY INITIAL EVALUATION ADULT - RANGE OF MOTION EXAMINATION, REHAB EVAL
right hand contracture; pt endorsed this as his baseline/bilateral upper extremity ROM was WFL (within functional limits)/bilateral lower extremity ROM was WFL (within functional limits)/deficits as listed below

## 2022-10-30 NOTE — PHYSICAL THERAPY INITIAL EVALUATION ADULT - PERTINENT HX OF CURRENT PROBLEM, REHAB EVAL
Pt is a 57 year old male who presented to [facility] with tingling to left hand and foot with weakness

## 2022-10-30 NOTE — H&P ADULT - NSHPREVIEWOFSYSTEMS_GEN_ALL_CORE
CONSTITUTIONAL: No weakness, fevers or chills  EYES/ENT: No visual changes;  No vertigo or throat pain   NECK: No pain or stiffness  RESPIRATORY: No cough, wheezing, hemoptysis; No shortness of breath  CARDIOVASCULAR: No chest pain or palpitations  GASTROINTESTINAL: No abdominal or epigastric pain. No nausea, vomiting, or hematemesis; No diarrhea or constipation. No melena or hematochezia.  GENITOURINARY: No dysuria, frequency or hematuria  NEUROLOGICAL: No numbness or weakness  SKIN: No itching, burning, rashes, or lesions   All other review of systems is negative unless indicated above. CONSTITUTIONAL: +LLE weakness, fevers or chills  EYES/ENT: No visual changes;  No vertigo or throat pain   NECK: No pain or stiffness  RESPIRATORY: No cough, wheezing, hemoptysis; No shortness of breath  CARDIOVASCULAR: No chest pain or palpitations  GASTROINTESTINAL: No abdominal or epigastric pain. No nausea, vomiting, or hematemesis; No diarrhea or constipation. No melena or hematochezia.  GENITOURINARY: No dysuria, frequency or hematuria  NEUROLOGICAL: +numbness/tingling in left fingertips, specifically digits 2 and 3. Reports occasional weakness in his left foot and sometimes numbness/tingling in the bottom of the foot   SKIN: No itching, burning, rashes, or lesions   All other review of systems is negative unless indicated above.

## 2022-10-30 NOTE — H&P ADULT - PROBLEM SELECTOR PLAN 3
-likely secondary to degenerative disc disease seen on CT   -no acute symptoms that require urgent surgical consultation or intervention   -will get physical therapy and occupational therapy evaluations. patient is not currently experiencing pain and does not request pain medication.   -can trial lidocaine patch and or other pain modalities if needed

## 2022-10-30 NOTE — PHYSICAL THERAPY INITIAL EVALUATION ADULT - ADDITIONAL COMMENTS
Pt lives with his wife in a house with 1 step to enter; pt resides on the first floor. prior to admission pt was independent with all mobility with all mobility and ambulated without an assistive device. pt denies any falls within the last year. Pt does not own any medical equipment.     Pt. left comfortable in bed, call bell in reach and in NAD.

## 2022-10-30 NOTE — PROGRESS NOTE ADULT - PROBLEM SELECTOR PLAN 3
-likely secondary to degenerative disc disease seen on CT   -no acute symptoms that require urgent surgical consultation or intervention   -will get physical therapy and occupational therapy evaluations. patient is not currently experiencing pain and does not request pain medication.   -can trial lidocaine patch and or other pain modalities if needed -likely secondary to degenerative disc disease seen on CT   -no acute symptoms that require urgent surgical consultation or intervention   -will get physical therapy and occupational therapy evaluations. patient is not currently experiencing pain and does not request pain medication.   -started on Flexeril 5 q8 for pain

## 2022-10-30 NOTE — OCCUPATIONAL THERAPY INITIAL EVALUATION ADULT - RANGE OF MOTION EXAMINATION, UPPER EXTREMITY
except right shoulder 0-95 flexion actively/bilateral UE Active Assistive ROM was WFL  (within functional limits)

## 2022-10-30 NOTE — OCCUPATIONAL THERAPY INITIAL EVALUATION ADULT - PERTINENT HX OF CURRENT PROBLEM, REHAB EVAL
Pt is a 57 year old male with PMH of chronic back pain and right sided hand/wrist contracture after a work related injury presenting to the hospital for paresthesias of the left hand/fingers/foot with related left foot drop and difficulty ambulating, found to have cervical and lumbar degenerative disc disease on CT scan.

## 2022-10-30 NOTE — ED ADULT NURSE REASSESSMENT NOTE - PATIENT ON (OXYGEN DELIVERY METHOD)
Margarita Randle  [address] [facility] [address]      2019      : 1970    Dear Ms. Randle:    We have been trying to reach you without success.  Please call my office at [phone].    Thank you for your timely response.    Sincerely,         Elena Archer MD        
room air

## 2022-10-30 NOTE — H&P ADULT - PROBLEM SELECTOR PLAN 1
-possibly due to peripheral nerve disease, spinal cord compression/radiculopathy, transverse myelitis, peroneal nerve injury (would affect dorsiflexion and foot eversion), autoimmune diseases like ALS, Muscular dystrophy, etc   -appreciate neurology recommendations; workup for vitamin deficiencies, lyme, syphyllis, thyroid disease, diabetes, HLD sent   -ESR/CRP WNL   -imaging with MR head, C/T/L spine ordered   -patient will need outpatient EMG testing   -fall precautions   -PT/OT eval to determine therapy needs/disposition -possibly due to peripheral nerve disease, spinal cord compression/radiculopathy, transverse myelitis, peroneal nerve injury (would affect dorsiflexion and foot eversion), autoimmune diseases like ALS, Muscular dystrophy, etc   -appreciate neurology recommendations; workup for vitamin deficiencies, lyme, syphyllis, thyroid disease, diabetes, HLD sent   -ESR/CRP WNL   -imaging with MR head, C/T/L spine ordered   -patient will need outpatient EMG testing   -fall precautions, ambulate with assistance  -PT/OT eval to determine therapy needs/disposition

## 2022-10-30 NOTE — H&P ADULT - PROBLEM SELECTOR PLAN 2
-paresthesias of left hand, foot.   -ddx includes disease of the spinal nerve root (radiculopathy), disease of the spinal cord (myelopathy), disease of the peripheral nerves (neuropathy), vitamin deficiencies, autoimmune disorders, infections   -workup per neuro as above   -MR imaging of head, C/T/L spine ordered, although patient will think about it and might refuse   -comprehensive metabolic panel and complete blood count panel all WNL

## 2022-10-31 LAB
ALBUMIN SERPL ELPH-MCNC: 4.1 G/DL — SIGNIFICANT CHANGE UP (ref 3.3–5)
ALP SERPL-CCNC: 64 U/L — SIGNIFICANT CHANGE UP (ref 40–120)
ALT FLD-CCNC: 23 U/L — SIGNIFICANT CHANGE UP (ref 4–41)
ANION GAP SERPL CALC-SCNC: 10 MMOL/L — SIGNIFICANT CHANGE UP (ref 7–14)
AST SERPL-CCNC: 26 U/L — SIGNIFICANT CHANGE UP (ref 4–40)
B BURGDOR C6 AB SER-ACNC: NEGATIVE — SIGNIFICANT CHANGE UP
B BURGDOR IGG+IGM SER-ACNC: 0.38 INDEX — SIGNIFICANT CHANGE UP (ref 0.01–0.89)
BASOPHILS # BLD AUTO: 0.05 K/UL — SIGNIFICANT CHANGE UP (ref 0–0.2)
BASOPHILS NFR BLD AUTO: 0.6 % — SIGNIFICANT CHANGE UP (ref 0–2)
BILIRUB SERPL-MCNC: 0.3 MG/DL — SIGNIFICANT CHANGE UP (ref 0.2–1.2)
BUN SERPL-MCNC: 21 MG/DL — SIGNIFICANT CHANGE UP (ref 7–23)
CALCIUM SERPL-MCNC: 9.3 MG/DL — SIGNIFICANT CHANGE UP (ref 8.4–10.5)
CHLORIDE SERPL-SCNC: 102 MMOL/L — SIGNIFICANT CHANGE UP (ref 98–107)
CO2 SERPL-SCNC: 26 MMOL/L — SIGNIFICANT CHANGE UP (ref 22–31)
CREAT SERPL-MCNC: 0.88 MG/DL — SIGNIFICANT CHANGE UP (ref 0.5–1.3)
EGFR: 100 ML/MIN/1.73M2 — SIGNIFICANT CHANGE UP
EOSINOPHIL # BLD AUTO: 0.16 K/UL — SIGNIFICANT CHANGE UP (ref 0–0.5)
EOSINOPHIL NFR BLD AUTO: 2 % — SIGNIFICANT CHANGE UP (ref 0–6)
GLUCOSE SERPL-MCNC: 102 MG/DL — HIGH (ref 70–99)
HCT VFR BLD CALC: 48 % — SIGNIFICANT CHANGE UP (ref 39–50)
HGB BLD-MCNC: 16 G/DL — SIGNIFICANT CHANGE UP (ref 13–17)
IANC: 4.12 K/UL — SIGNIFICANT CHANGE UP (ref 1.8–7.4)
IMM GRANULOCYTES NFR BLD AUTO: 0.4 % — SIGNIFICANT CHANGE UP (ref 0–0.9)
LYMPHOCYTES # BLD AUTO: 2.94 K/UL — SIGNIFICANT CHANGE UP (ref 1–3.3)
LYMPHOCYTES # BLD AUTO: 36.5 % — SIGNIFICANT CHANGE UP (ref 13–44)
MAGNESIUM SERPL-MCNC: 2 MG/DL — SIGNIFICANT CHANGE UP (ref 1.6–2.6)
MCHC RBC-ENTMCNC: 28.2 PG — SIGNIFICANT CHANGE UP (ref 27–34)
MCHC RBC-ENTMCNC: 33.3 GM/DL — SIGNIFICANT CHANGE UP (ref 32–36)
MCV RBC AUTO: 84.5 FL — SIGNIFICANT CHANGE UP (ref 80–100)
MONOCYTES # BLD AUTO: 0.75 K/UL — SIGNIFICANT CHANGE UP (ref 0–0.9)
MONOCYTES NFR BLD AUTO: 9.3 % — SIGNIFICANT CHANGE UP (ref 2–14)
NEUTROPHILS # BLD AUTO: 4.12 K/UL — SIGNIFICANT CHANGE UP (ref 1.8–7.4)
NEUTROPHILS NFR BLD AUTO: 51.2 % — SIGNIFICANT CHANGE UP (ref 43–77)
NRBC # BLD: 0 /100 WBCS — SIGNIFICANT CHANGE UP (ref 0–0)
NRBC # FLD: 0 K/UL — SIGNIFICANT CHANGE UP (ref 0–0)
PHOSPHATE SERPL-MCNC: 3.9 MG/DL — SIGNIFICANT CHANGE UP (ref 2.5–4.5)
PLATELET # BLD AUTO: 257 K/UL — SIGNIFICANT CHANGE UP (ref 150–400)
POTASSIUM SERPL-MCNC: 4.2 MMOL/L — SIGNIFICANT CHANGE UP (ref 3.5–5.3)
POTASSIUM SERPL-SCNC: 4.2 MMOL/L — SIGNIFICANT CHANGE UP (ref 3.5–5.3)
PROT SERPL-MCNC: 7 G/DL — SIGNIFICANT CHANGE UP (ref 6–8.3)
RBC # BLD: 5.68 M/UL — SIGNIFICANT CHANGE UP (ref 4.2–5.8)
RBC # FLD: 14.6 % — HIGH (ref 10.3–14.5)
SODIUM SERPL-SCNC: 138 MMOL/L — SIGNIFICANT CHANGE UP (ref 135–145)
T PALLIDUM AB TITR SER: NEGATIVE — SIGNIFICANT CHANGE UP
WBC # BLD: 8.05 K/UL — SIGNIFICANT CHANGE UP (ref 3.8–10.5)
WBC # FLD AUTO: 8.05 K/UL — SIGNIFICANT CHANGE UP (ref 3.8–10.5)

## 2022-10-31 PROCEDURE — 99233 SBSQ HOSP IP/OBS HIGH 50: CPT

## 2022-10-31 RX ADMIN — ENOXAPARIN SODIUM 40 MILLIGRAM(S): 100 INJECTION SUBCUTANEOUS at 05:31

## 2022-10-31 NOTE — PROGRESS NOTE ADULT - PROBLEM SELECTOR PLAN 4
-DVT ppx with lovenox   -regular diet   -Dispo: pending further workup and PT/OT eval -DVT ppx with lovenox   -regular diet   -Dispo: pending further workup

## 2022-10-31 NOTE — PROGRESS NOTE ADULT - PROBLEM SELECTOR PLAN 2
-10/30 AM: pt reports continued paresthesias of left hand and foot  -paresthesias of left hand, foot.   -ddx includes disease of the spinal nerve root (radiculopathy), disease of the spinal cord (myelopathy), disease of the peripheral nerves (neuropathy), vitamin deficiencies, autoimmune disorders, infections   -workup per neuro as above   -MR imaging of head, C/T/L spine ordered, although patient will think about it and might refuse   -comprehensive metabolic panel and complete blood count panel all WNL -10/31 AM: pt reports continued paresthesias of left hand and foot  -10/30 AM: pt reports continued paresthesias of left hand and foot  -paresthesias of left hand, foot.   -ddx includes disease of the spinal nerve root (radiculopathy), disease of the spinal cord (myelopathy), disease of the peripheral nerves (neuropathy), vitamin deficiencies, autoimmune disorders, infections   -workup per neuro as above   -MR imaging of head, C/T/L spine ordered, although patient will think about it and might refuse   -comprehensive metabolic panel and complete blood count panel all WNL

## 2022-10-31 NOTE — PROGRESS NOTE ADULT - PROBLEM SELECTOR PLAN 3
-likely secondary to degenerative disc disease seen on CT   -no acute symptoms that require urgent surgical consultation or intervention   -will get physical therapy and occupational therapy evaluations. patient is not currently experiencing pain and does not request pain medication.   -started on Flexeril 5 q8 for pain

## 2022-10-31 NOTE — PROGRESS NOTE ADULT - PROBLEM SELECTOR PLAN 1
10/30 AM: 5/5 dorsi/plantarflexion b/l on exam  -possibly due to peripheral nerve disease, spinal cord compression/radiculopathy, transverse myelitis, peroneal nerve injury (would affect dorsiflexion and foot eversion), autoimmune diseases like ALS, Muscular dystrophy, etc   -appreciate neurology recommendations; workup for vitamin deficiencies, lyme, syphyllis, thyroid disease, diabetes, HLD sent   -ESR/CRP WNL   -imaging with MR head, C/T/L spine ordered   -patient will need outpatient EMG testing   -fall precautions, ambulate with assistance  -PT/OT eval to determine therapy needs/disposition 10/31 AM: 5/5 dorsi/plantarflexion b/l on exam, unstable gait with high steppage in left foot  10/30 AM: 5/5 dorsi/plantarflexion b/l on exam  -possibly due to peripheral nerve disease, spinal cord compression/radiculopathy, transverse myelitis, peroneal nerve injury (would affect dorsiflexion and foot eversion), autoimmune diseases like ALS, Muscular dystrophy, etc   -appreciate neurology recommendations; workup for vitamin deficiencies, lyme, syphyllis, thyroid disease, diabetes, HLD sent   -ESR/CRP WNL   - Plan for MRI with ativan premedication  -patient will need outpatient EMG testing   -fall precautions, ambulate with assistance  -PT/OT both recommending outpatient f/u.

## 2022-10-31 NOTE — PROGRESS NOTE ADULT - SUBJECTIVE AND OBJECTIVE BOX
Interval Hx:    MEDICATIONS  (STANDING):  enoxaparin Injectable 40 milliGRAM(s) SubCutaneous every 24 hours  LORazepam     Tablet 0.5 milliGRAM(s) Oral once    MEDICATIONS  (PRN):  cyclobenzaprine 5 milliGRAM(s) Oral three times a day PRN Muscle Spasm    Vital Signs (24 Hrs):  T(C): 36.8 (10-31-22 @ 05:28), Max: 36.9 (10-30-22 @ 21:31)  HR: 71 (10-31-22 @ 05:28) (70 - 83)  BP: 106/71 (10-31-22 @ 05:28) (101/62 - 109/64)  RR: 16 (10-31-22 @ 05:28) (16 - 17)  SpO2: 100% (10-31-22 @ 05:28) (100% - 100%)  Wt(kg): --  Daily     Daily     I&O's Summary    Physical Exam:     Interval Hx: No acute events overnight. Pt amenable to MRI with ativan premedication.    MEDICATIONS  (STANDING):  enoxaparin Injectable 40 milliGRAM(s) SubCutaneous every 24 hours  LORazepam     Tablet 0.5 milliGRAM(s) Oral once    MEDICATIONS  (PRN):  cyclobenzaprine 5 milliGRAM(s) Oral three times a day PRN Muscle Spasm    Vital Signs (24 Hrs):  T(C): 36.8 (10-31-22 @ 05:28), Max: 36.9 (10-30-22 @ 21:31)  HR: 71 (10-31-22 @ 05:28) (70 - 83)  BP: 106/71 (10-31-22 @ 05:28) (101/62 - 109/64)  RR: 16 (10-31-22 @ 05:28) (16 - 17)  SpO2: 100% (10-31-22 @ 05:28) (100% - 100%)    I&O's Summary    Physical Exam:  Constitutional: NAD, awake and alert  Respiratory: Breath sounds are clear bilaterally, No wheezing, rales or rhonchi  Cardiovascular: S1 and S2, regular rate and rhythm, no Murmurs, gallops or rubs, no JVD,    Gastrointestinal: Bowel Sounds present, soft, nontender, nondistended, no guarding, no rebound  Extremities: No LE edema b/l  Vascular: 2+ peripheral pulses lower ex  Neurological: AAOx3, PERRLA, EOMI, 5/5 dorsi/plantarflexion b/l, decreased  strength and 4/5 str arm extension on R side, normal  str and arm str on L side, FTN normal, Gait is unstable with slightly higher steppage in left foot.  Musculoskeletal: CLAROS x 4  Skin: No cyanosis or pallor      LABS:  10-30 @ 06:30 Creatine 259 U/L<H> [30 - 200]  cret                        16.0   8.05  )-----------( 257      ( 31 Oct 2022 07:05 )             48.0     10-31    138  |  102  |  21  ----------------------------<  102<H>  4.2   |  26  |  0.88    Ca    9.3      31 Oct 2022 07:05  Phos  3.9     10-31  Mg     2.00     10-31    TPro  7.0  /  Alb  4.1  /  TBili  0.3  /  DBili  x   /  AST  26  /  ALT  23  /  AlkPhos  64  10-31    PT/INR - ( 29 Oct 2022 14:29 )   PT: 12.1 sec;   INR: 1.04 ratio         PTT - ( 29 Oct 2022 14:29 )  PTT:30.1 sec

## 2022-10-31 NOTE — PROGRESS NOTE ADULT - ASSESSMENT
57 year old male with hx of chronic back pain and right sided hand/wrist contracture after a work related injury presenting to the hospital for paresthesias of the left hand/fingers/foot with related left foot drop and difficulty ambulating, found to have cervical and lumbar degenerative disc disease on CT scan. Pending MRI, neuropathy workup per neuro, PT/OT eval. 57 year old male with hx of chronic back pain and right sided hand/wrist contracture after a work related injury presenting to the hospital for paresthesias of the left hand/fingers/foot with related left foot drop and difficulty ambulating, found to have cervical and lumbar degenerative disc disease on CT scan. PT/OT both recommending outpatient f/u. Pending MRI. Patient will need outpatient EMG testing after discharge.

## 2022-11-01 ENCOUNTER — TRANSCRIPTION ENCOUNTER (OUTPATIENT)
Age: 57
End: 2022-11-01

## 2022-11-01 LAB
COPPER SERPL-MCNC: 83 UG/DL — SIGNIFICANT CHANGE UP (ref 69–132)
ZINC SERPL-MCNC: 62 UG/DL — SIGNIFICANT CHANGE UP (ref 44–115)

## 2022-11-01 PROCEDURE — 72156 MRI NECK SPINE W/O & W/DYE: CPT | Mod: 26

## 2022-11-01 PROCEDURE — 72157 MRI CHEST SPINE W/O & W/DYE: CPT | Mod: 26

## 2022-11-01 PROCEDURE — 70553 MRI BRAIN STEM W/O & W/DYE: CPT | Mod: 26

## 2022-11-01 PROCEDURE — 72158 MRI LUMBAR SPINE W/O & W/DYE: CPT | Mod: 26

## 2022-11-01 PROCEDURE — 99233 SBSQ HOSP IP/OBS HIGH 50: CPT

## 2022-11-01 RX ORDER — CYCLOBENZAPRINE HYDROCHLORIDE 10 MG/1
10 TABLET, FILM COATED ORAL ONCE
Refills: 0 | Status: COMPLETED | OUTPATIENT
Start: 2022-11-01 | End: 2022-11-01

## 2022-11-01 RX ADMIN — Medication 1 MILLIGRAM(S): at 17:19

## 2022-11-01 RX ADMIN — CYCLOBENZAPRINE HYDROCHLORIDE 10 MILLIGRAM(S): 10 TABLET, FILM COATED ORAL at 17:18

## 2022-11-01 RX ADMIN — ENOXAPARIN SODIUM 40 MILLIGRAM(S): 100 INJECTION SUBCUTANEOUS at 05:04

## 2022-11-01 RX ADMIN — Medication 2 MILLIGRAM(S): at 16:21

## 2022-11-01 NOTE — PROGRESS NOTE ADULT - SUBJECTIVE AND OBJECTIVE BOX
Interval hx:    MEDICATIONS  (STANDING):  enoxaparin Injectable 40 milliGRAM(s) SubCutaneous every 24 hours  LORazepam     Tablet 0.5 milliGRAM(s) Oral once    MEDICATIONS  (PRN):  cyclobenzaprine 5 milliGRAM(s) Oral three times a day PRN Muscle Spasm    Vital Signs (24 Hrs):  T(C): 37 (11-01-22 @ 05:33), Max: 37 (11-01-22 @ 05:33)  HR: 82 (11-01-22 @ 05:33) (72 - 82)  BP: 98/62 (11-01-22 @ 05:33) (98/62 - 104/72)  RR: 17 (11-01-22 @ 05:33) (16 - 18)  SpO2: 99% (11-01-22 @ 05:33) (98% - 100%)  Wt(kg): --  Daily     Daily     I&O's Summary    Physical Exam:     Interval hx: No acute events overnight. Pt continuing to endorse L foot weakness, left foot + hand paresthesias, and gait instability. Denies headache, fever, chills, chest pain, dyspnea, n/v/c/d, or urinary symptoms.    MEDICATIONS  (STANDING):  enoxaparin Injectable 40 milliGRAM(s) SubCutaneous every 24 hours  LORazepam     Tablet 0.5 milliGRAM(s) Oral once    MEDICATIONS  (PRN):  cyclobenzaprine 5 milliGRAM(s) Oral three times a day PRN Muscle Spasm    Vital Signs (24 Hrs):  T(C): 37 (11-01-22 @ 05:33), Max: 37 (11-01-22 @ 05:33)  HR: 82 (11-01-22 @ 05:33) (72 - 82)  BP: 98/62 (11-01-22 @ 05:33) (98/62 - 104/72)  RR: 17 (11-01-22 @ 05:33) (16 - 18)  SpO2: 99% (11-01-22 @ 05:33) (98% - 100%)  Wt(kg): --  Daily     Daily     I&O's Summary    Physical Exam:  Constitutional: NAD, awake and alert  Respiratory: Breath sounds are clear bilaterally, No wheezing, rales or rhonchi  Cardiovascular: S1 and S2, regular rate and rhythm, no Murmurs, gallops or rubs  Gastrointestinal: Bowel Sounds present, soft, nontender, nondistended, no guarding, no rebound  Extremities: No LE edema b/l  Neurological: AAOx3, PERRLA, EOMI, 5/5 dorsi/plantarflexion b/l, decreased  strength and 4/5 str arm extension on R side (R side weakness is baseline deficit), normal  str and arm str on L side, Dysmetria on FTN b/l, Gait is unstable with slightly higher steppage in left foot [Exam unchanged from prior day]  Musculoskeletal: CLAROS x 4  Skin: No cyanosis or pallor    LABS:  cret                        16.0   8.05  )-----------( 257      ( 31 Oct 2022 07:05 )             48.0     10-31    138  |  102  |  21  ----------------------------<  102<H>  4.2   |  26  |  0.88    Ca    9.3      31 Oct 2022 07:05  Phos  3.9     10-31  Mg     2.00     10-31    TPro  7.0  /  Alb  4.1  /  TBili  0.3  /  DBili  x   /  AST  26  /  ALT  23  /  AlkPhos  64  10-31         Interval hx: No acute events overnight. Pt continuing to endorse L foot weakness, left foot + hand paresthesias, and gait instability. Denies headache, fever, chills, chest pain, dyspnea, n/v/c/d, or urinary symptoms.    MEDICATIONS  (STANDING):  enoxaparin Injectable 40 milliGRAM(s) SubCutaneous every 24 hours  LORazepam     Tablet 0.5 milliGRAM(s) Oral once    MEDICATIONS  (PRN):  cyclobenzaprine 5 milliGRAM(s) Oral three times a day PRN Muscle Spasm    Vital Signs (24 Hrs):  T(C): 37 (11-01-22 @ 05:33), Max: 37 (11-01-22 @ 05:33)  HR: 82 (11-01-22 @ 05:33) (72 - 82)  BP: 98/62 (11-01-22 @ 05:33) (98/62 - 104/72)  RR: 17 (11-01-22 @ 05:33) (16 - 18)  SpO2: 99% (11-01-22 @ 05:33) (98% - 100%)  Wt(kg): --  Daily     Daily     I&O's Summary    Physical Exam:  Constitutional: NAD, awake and alert  Respiratory: Breath sounds are clear bilaterally, No wheezing, rales or rhonchi  Cardiovascular: S1 and S2, regular rate and rhythm, no Murmurs, gallops or rubs  Gastrointestinal: Bowel Sounds present, soft, nontender, nondistended, no guarding, no rebound  Extremities: No LE edema b/l  Neurological: AAOx3, PERRLA, EOMI, 5/5 dorsi/plantarflexion b/l, decreased  strength and 4/5 str arm extension on R side (R side weakness is baseline deficit), normal  str and arm str on L side, Dysmetria on FTN b/l, Gait is unstable with slightly higher steppage in left foot, b/l patellar hyperreflexia [Exam unchanged from prior day]  Musculoskeletal: CLAROS x 4  Skin: No cyanosis or pallor    LABS:  cret                        16.0   8.05  )-----------( 257      ( 31 Oct 2022 07:05 )             48.0     10-31    138  |  102  |  21  ----------------------------<  102<H>  4.2   |  26  |  0.88    Ca    9.3      31 Oct 2022 07:05  Phos  3.9     10-31  Mg     2.00     10-31    TPro  7.0  /  Alb  4.1  /  TBili  0.3  /  DBili  x   /  AST  26  /  ALT  23  /  AlkPhos  64  10-31

## 2022-11-01 NOTE — DISCHARGE NOTE NURSING/CASE MANAGEMENT/SOCIAL WORK - PATIENT PORTAL LINK FT
You can access the FollowMyHealth Patient Portal offered by [facility] by registering at the following website: [email or web address]. By joining [email or web address] [email or web address] portal, you will also be able to view your health information using other applications (apps) compatible with our system.

## 2022-11-01 NOTE — DISCHARGE NOTE NURSING/CASE MANAGEMENT/SOCIAL WORK - NSDCPEFALRISK_GEN_ALL_CORE
For information on Fall & Injury Prevention, visit: [email or web address] OR  [email or web address] OR  https://www.cdc.gov/steadi/patient.html

## 2022-11-01 NOTE — PROGRESS NOTE ADULT - ASSESSMENT
57 year old male with hx of chronic back pain and right sided hand/wrist contracture after a work related injury presenting to the hospital for paresthesias of the left hand/fingers/foot with related left foot drop and difficulty ambulating, found to have cervical and lumbar degenerative disc disease on CT scan. PT/OT both recommending outpatient f/u. Pending MRI. Patient will need outpatient EMG testing after discharge.

## 2022-11-01 NOTE — PROGRESS NOTE ADULT - PROBLEM SELECTOR PLAN 1
10/31 AM: 5/5 dorsi/plantarflexion b/l on exam, unstable gait with high steppage in left foot  10/30 AM: 5/5 dorsi/plantarflexion b/l on exam  -possibly due to peripheral nerve disease, spinal cord compression/radiculopathy, transverse myelitis, peroneal nerve injury (would affect dorsiflexion and foot eversion), autoimmune diseases like ALS, Muscular dystrophy, etc   -appreciate neurology recommendations; workup for vitamin deficiencies, lyme, syphyllis, thyroid disease, diabetes, HLD sent   -ESR/CRP WNL   - Plan for MRI with ativan premedication  -patient will need outpatient EMG testing   -fall precautions, ambulate with assistance  -PT/OT both recommending outpatient f/u. 11/1 AM: 5/5 dorsi/plantarflexion b/l on exam, unstable gait with high steppage in left foot, dysmetria on FTN b/l  10/31 AM: 5/5 dorsi/plantarflexion b/l on exam, unstable gait with high steppage in left foot, dysmetria on FTN b/l  On admission, pt p/w left foot drop. 5/5 Str on exam but gait unstable with left foot high steppage    10/30 AM: 5/5 dorsi/plantarflexion b/l on exam  - f/u MR Brain and chong-spine (will receive Ativan as premedication)  -neurology following: pt's presentation possibly due to peripheral nerve disease, spinal cord compression/radiculopathy, transverse myelitis, peroneal nerve injury (would affect dorsiflexion and foot eversion), autoimmune diseases like ALS, Muscular dystrophy, etc   -workup per neurology negative thus far (negative syphilis, lyme, diabetes, b12/folate)  - Plan for MRI with ativan premedication  -patient will need outpatient EMG testing   -fall precautions, ambulate with assistance  -PT/OT both recommending outpatient f/u. On admission, pt p/w left foot drop. 5/5 Str on exam but gait unstable with left foot high steppage    11/1 AM: 5/5 dorsi/plantarflexion b/l on exam, unstable gait with high steppage in left foot, dysmetria on FTN b/l, b/l patellar hyperreflexia  10/31 AM: 5/5 dorsi/plantarflexion b/l on exam, unstable gait with high steppage in left foot, dysmetria on FTN b/l, b/l patellar hyperreflexia  10/30 AM: 5/5 dorsi/plantarflexion b/l on exam,   - f/u MR Brain and chong-spine (will receive Ativan as premedication)  -neurology following: pt's presentation possibly due to peripheral nerve disease, spinal cord compression/radiculopathy, transverse myelitis, peroneal nerve injury (would affect dorsiflexion and foot eversion), autoimmune diseases like ALS, Muscular dystrophy, etc   -workup per neurology negative thus far (negative syphilis, lyme, diabetes, b12/folate)  - Plan for MRI with ativan premedication  -patient will need outpatient EMG testing   -fall precautions, ambulate with assistance  -PT/OT both recommending outpatient f/u.

## 2022-11-01 NOTE — PROGRESS NOTE ADULT - PROBLEM SELECTOR PLAN 2
-10/31 AM: pt reports continued paresthesias of left hand and foot  -10/30 AM: pt reports continued paresthesias of left hand and foot  -paresthesias of left hand, foot.   -ddx includes disease of the spinal nerve root (radiculopathy), disease of the spinal cord (myelopathy), disease of the peripheral nerves (neuropathy), vitamin deficiencies, autoimmune disorders, infections   -workup per neuro as above   -MR imaging of head, C/T/L spine ordered, although patient will think about it and might refuse   -comprehensive metabolic panel and complete blood count panel all WNL On admission, paresthesias of left hand and foot    -11/01 AM: pt reports continued paresthesias of left hand and foot  -10/31 AM: pt reports continued paresthesias of left hand and foot  -10/30 AM: pt reports continued paresthesias of left hand and foot  -see above problem for neuro ddx + workup

## 2022-11-02 DIAGNOSIS — M47.812 SPONDYLOSIS WITHOUT MYELOPATHY OR RADICULOPATHY, CERVICAL REGION: ICD-10-CM

## 2022-11-02 DIAGNOSIS — M47.12 OTHER SPONDYLOSIS WITH MYELOPATHY, CERVICAL REGION: ICD-10-CM

## 2022-11-02 LAB
ALBUMIN SERPL ELPH-MCNC: 4 G/DL — SIGNIFICANT CHANGE UP (ref 3.3–5)
ALP SERPL-CCNC: 63 U/L — SIGNIFICANT CHANGE UP (ref 40–120)
ALT FLD-CCNC: 21 U/L — SIGNIFICANT CHANGE UP (ref 4–41)
ANION GAP SERPL CALC-SCNC: 10 MMOL/L — SIGNIFICANT CHANGE UP (ref 7–14)
AST SERPL-CCNC: 22 U/L — SIGNIFICANT CHANGE UP (ref 4–40)
BASOPHILS # BLD AUTO: 0.04 K/UL — SIGNIFICANT CHANGE UP (ref 0–0.2)
BASOPHILS NFR BLD AUTO: 0.5 % — SIGNIFICANT CHANGE UP (ref 0–2)
BILIRUB SERPL-MCNC: 0.4 MG/DL — SIGNIFICANT CHANGE UP (ref 0.2–1.2)
BUN SERPL-MCNC: 17 MG/DL — SIGNIFICANT CHANGE UP (ref 7–23)
CALCIUM SERPL-MCNC: 8.8 MG/DL — SIGNIFICANT CHANGE UP (ref 8.4–10.5)
CHLORIDE SERPL-SCNC: 106 MMOL/L — SIGNIFICANT CHANGE UP (ref 98–107)
CO2 SERPL-SCNC: 25 MMOL/L — SIGNIFICANT CHANGE UP (ref 22–31)
CREAT SERPL-MCNC: 0.87 MG/DL — SIGNIFICANT CHANGE UP (ref 0.5–1.3)
EGFR: 101 ML/MIN/1.73M2 — SIGNIFICANT CHANGE UP
EOSINOPHIL # BLD AUTO: 0.15 K/UL — SIGNIFICANT CHANGE UP (ref 0–0.5)
EOSINOPHIL NFR BLD AUTO: 1.9 % — SIGNIFICANT CHANGE UP (ref 0–6)
GLUCOSE BLDC GLUCOMTR-MCNC: 95 MG/DL — SIGNIFICANT CHANGE UP (ref 70–99)
GLUCOSE SERPL-MCNC: 90 MG/DL — SIGNIFICANT CHANGE UP (ref 70–99)
HCT VFR BLD CALC: 45.8 % — SIGNIFICANT CHANGE UP (ref 39–50)
HGB BLD-MCNC: 15.3 G/DL — SIGNIFICANT CHANGE UP (ref 13–17)
IANC: 3.8 K/UL — SIGNIFICANT CHANGE UP (ref 1.8–7.4)
IMM GRANULOCYTES NFR BLD AUTO: 0.1 % — SIGNIFICANT CHANGE UP (ref 0–0.9)
LYMPHOCYTES # BLD AUTO: 3.08 K/UL — SIGNIFICANT CHANGE UP (ref 1–3.3)
LYMPHOCYTES # BLD AUTO: 38.4 % — SIGNIFICANT CHANGE UP (ref 13–44)
MCHC RBC-ENTMCNC: 28.4 PG — SIGNIFICANT CHANGE UP (ref 27–34)
MCHC RBC-ENTMCNC: 33.4 GM/DL — SIGNIFICANT CHANGE UP (ref 32–36)
MCV RBC AUTO: 85 FL — SIGNIFICANT CHANGE UP (ref 80–100)
MONOCYTES # BLD AUTO: 0.94 K/UL — HIGH (ref 0–0.9)
MONOCYTES NFR BLD AUTO: 11.7 % — SIGNIFICANT CHANGE UP (ref 2–14)
NEUTROPHILS # BLD AUTO: 3.8 K/UL — SIGNIFICANT CHANGE UP (ref 1.8–7.4)
NEUTROPHILS NFR BLD AUTO: 47.4 % — SIGNIFICANT CHANGE UP (ref 43–77)
NRBC # BLD: 0 /100 WBCS — SIGNIFICANT CHANGE UP (ref 0–0)
NRBC # FLD: 0 K/UL — SIGNIFICANT CHANGE UP (ref 0–0)
PLATELET # BLD AUTO: 264 K/UL — SIGNIFICANT CHANGE UP (ref 150–400)
POTASSIUM SERPL-MCNC: 4.3 MMOL/L — SIGNIFICANT CHANGE UP (ref 3.5–5.3)
POTASSIUM SERPL-SCNC: 4.3 MMOL/L — SIGNIFICANT CHANGE UP (ref 3.5–5.3)
PROT SERPL-MCNC: 6.8 G/DL — SIGNIFICANT CHANGE UP (ref 6–8.3)
PYRIDOXAL PHOS SERPL-MCNC: 10.3 UG/L — SIGNIFICANT CHANGE UP (ref 3.4–65.2)
RBC # BLD: 5.39 M/UL — SIGNIFICANT CHANGE UP (ref 4.2–5.8)
RBC # FLD: 14.6 % — HIGH (ref 10.3–14.5)
SODIUM SERPL-SCNC: 141 MMOL/L — SIGNIFICANT CHANGE UP (ref 135–145)
WBC # BLD: 8.02 K/UL — SIGNIFICANT CHANGE UP (ref 3.8–10.5)
WBC # FLD AUTO: 8.02 K/UL — SIGNIFICANT CHANGE UP (ref 3.8–10.5)

## 2022-11-02 PROCEDURE — 99232 SBSQ HOSP IP/OBS MODERATE 35: CPT

## 2022-11-02 PROCEDURE — 93010 ELECTROCARDIOGRAM REPORT: CPT

## 2022-11-02 RX ADMIN — ENOXAPARIN SODIUM 40 MILLIGRAM(S): 100 INJECTION SUBCUTANEOUS at 06:05

## 2022-11-02 NOTE — CHART NOTE - NSCHARTNOTEFT_GEN_A_CORE
57 year old male with hx of chronic back pain and right sided hand/wrist contracture after a work related injury presenting to the hospital for paresthesias of the left hand/fingers/foot with related left foot drop and difficulty ambulating, found to have cervical and lumbar degenerative disc disease on CT scan. PT/OT both recommending outpatient f/u. Pending MRI.     RRT called for mechanical fall, patient fell on his knees getting out of bed. Bruising on bilateral knees, no head trauma, normal neuro exam. Normal/stable vitals and fingerstick. ECG normal sinus. AM labs drawn.     [ ] f/u am labs

## 2022-11-02 NOTE — PROGRESS NOTE ADULT - PROBLEM SELECTOR PLAN 1
On admission, pt p/w left foot drop. 5/5 Str on exam but gait unstable with left foot high steppage    11/1 AM: 5/5 dorsi/plantarflexion b/l on exam, unstable gait with high steppage in left foot, dysmetria on FTN b/l, b/l patellar hyperreflexia  10/31 AM: 5/5 dorsi/plantarflexion b/l on exam, unstable gait with high steppage in left foot, dysmetria on FTN b/l, b/l patellar hyperreflexia  10/30 AM: 5/5 dorsi/plantarflexion b/l on exam,   - f/u MR Brain and chnog-spine (will receive Ativan as premedication)  -neurology following: pt's presentation possibly due to peripheral nerve disease, spinal cord compression/radiculopathy, transverse myelitis, peroneal nerve injury (would affect dorsiflexion and foot eversion), autoimmune diseases like ALS, Muscular dystrophy, etc   -workup per neurology negative thus far (negative syphilis, lyme, diabetes, b12/folate)  - Plan for MRI with ativan premedication  -patient will need outpatient EMG testing   -fall precautions, ambulate with assistance  -PT/OT both recommending outpatient f/u. Pt with prior work accident (2019) resulting in chronic back pain and baseline R hand contracture + weakness. Pt with evidence of cervical spondylosis on MR this admission.    -Will f/u neuro regarding workup and management recommendations.

## 2022-11-02 NOTE — PROGRESS NOTE ADULT - PROBLEM SELECTOR PLAN 3
-likely secondary to degenerative disc disease seen on CT   -no acute symptoms that require urgent surgical consultation or intervention   -will get physical therapy and occupational therapy evaluations. patient is not currently experiencing pain and does not request pain medication.   -started on Flexeril 5 q8 for pain On admission, paresthesias of left hand and foot    -11/02 AM: pt reports continued paresthesias of left hand and foot  -11/01 AM: pt reports continued paresthesias of left hand and foot  -10/31 AM: pt reports continued paresthesias of left hand and foot  -10/30 AM: pt reports continued paresthesias of left hand and foot  -see above problem for neuro ddx + workup

## 2022-11-02 NOTE — PROGRESS NOTE ADULT - ASSESSMENT
57 year old male with hx of chronic back pain and right sided hand/wrist contracture after a work related injury presenting to the hospital for paresthesias of the left hand/fingers/foot with related left foot drop and difficulty ambulating, found to have cervical and lumbar degenerative disc disease on CT scan. PT/OT both recommending outpatient f/u. Pending MRI. Patient will need outpatient EMG testing after discharge. 57 year old male with hx of chronic back pain and right sided hand/wrist contracture after a work related injury presenting to the hospital for paresthesias of the left hand/fingers/foot with related left foot drop and difficulty ambulating, found to have cervical and lumbar degenerative disc disease on CT scan.  MR Head and chong-spine performed, will f/u with neuro regarding next steps for pt's workup and management. Patient will need outpatient EMG testing after discharge. PT/OT both recommending outpatient f/u.

## 2022-11-02 NOTE — PROGRESS NOTE ADULT - PROBLEM SELECTOR PLAN 4
-DVT ppx with lovenox   -regular diet   -Dispo: pending further workup -likely secondary to degenerative disc disease seen on CT   -no acute symptoms that require urgent surgical consultation or intervention   -will get physical therapy and occupational therapy evaluations. patient is not currently experiencing pain and does not request pain medication.

## 2022-11-02 NOTE — PROGRESS NOTE ADULT - PROBLEM SELECTOR PLAN 2
On admission, paresthesias of left hand and foot    -11/01 AM: pt reports continued paresthesias of left hand and foot  -10/31 AM: pt reports continued paresthesias of left hand and foot  -10/30 AM: pt reports continued paresthesias of left hand and foot  -see above problem for neuro ddx + workup On admission, pt p/w left foot drop. 5/5 Str on exam but gait unstable with left foot high steppage    11/2 AM: 5/5 dorsi/plantarflexion b/l on exam, dysmetria on FTN b/l, gait exam defered by pt  11/1 AM: 5/5 dorsi/plantarflexion b/l on exam, unstable gait with high steppage in left foot, dysmetria on FTN b/l, b/l patellar hyperreflexia  10/31 AM: 5/5 dorsi/plantarflexion b/l on exam, unstable gait with high steppage in left foot, dysmetria on FTN b/l, b/l patellar hyperreflexia  10/30 AM: 5/5 dorsi/plantarflexion b/l on exam,   - f/u MR Brain and chong-spine (will receive Ativan as premedication)  -neurology following: pt's presentation possibly due to peripheral nerve disease, spinal cord compression/radiculopathy, transverse myelitis, peroneal nerve injury (would affect dorsiflexion and foot eversion), autoimmune diseases like ALS, Muscular dystrophy, etc   -workup per neurology negative thus far (negative syphilis, lyme, diabetes, b12/folate)  - Plan for MRI with ativan premedication  -patient will need outpatient EMG testing   -fall precautions, ambulate with assistance  -PT/OT both recommending outpatient f/u.

## 2022-11-03 ENCOUNTER — TRANSCRIPTION ENCOUNTER (OUTPATIENT)
Age: 57
End: 2022-11-03

## 2022-11-03 LAB
24R-OH-CALCIDIOL SERPL-MCNC: 25.1 NG/ML — LOW (ref 30–80)
ALBUMIN SERPL ELPH-MCNC: 3.8 G/DL — SIGNIFICANT CHANGE UP (ref 3.3–5)
ALP SERPL-CCNC: 58 U/L — SIGNIFICANT CHANGE UP (ref 40–120)
ALT FLD-CCNC: 26 U/L — SIGNIFICANT CHANGE UP (ref 4–41)
ANION GAP SERPL CALC-SCNC: 9 MMOL/L — SIGNIFICANT CHANGE UP (ref 7–14)
AST SERPL-CCNC: 25 U/L — SIGNIFICANT CHANGE UP (ref 4–40)
BASOPHILS # BLD AUTO: 0.05 K/UL — SIGNIFICANT CHANGE UP (ref 0–0.2)
BASOPHILS NFR BLD AUTO: 0.6 % — SIGNIFICANT CHANGE UP (ref 0–2)
BILIRUB SERPL-MCNC: <0.2 MG/DL — SIGNIFICANT CHANGE UP (ref 0.2–1.2)
BUN SERPL-MCNC: 16 MG/DL — SIGNIFICANT CHANGE UP (ref 7–23)
CALCIUM SERPL-MCNC: 8.9 MG/DL — SIGNIFICANT CHANGE UP (ref 8.4–10.5)
CHLORIDE SERPL-SCNC: 105 MMOL/L — SIGNIFICANT CHANGE UP (ref 98–107)
CO2 SERPL-SCNC: 24 MMOL/L — SIGNIFICANT CHANGE UP (ref 22–31)
CREAT SERPL-MCNC: 0.8 MG/DL — SIGNIFICANT CHANGE UP (ref 0.5–1.3)
CRP SERPL-MCNC: <3 MG/L — SIGNIFICANT CHANGE UP
EGFR: 103 ML/MIN/1.73M2 — SIGNIFICANT CHANGE UP
EOSINOPHIL # BLD AUTO: 0.2 K/UL — SIGNIFICANT CHANGE UP (ref 0–0.5)
EOSINOPHIL NFR BLD AUTO: 2.5 % — SIGNIFICANT CHANGE UP (ref 0–6)
ERYTHROCYTE [SEDIMENTATION RATE] IN BLOOD: 7 MM/HR — SIGNIFICANT CHANGE UP (ref 1–15)
GLUCOSE SERPL-MCNC: 98 MG/DL — SIGNIFICANT CHANGE UP (ref 70–99)
HCT VFR BLD CALC: 43.3 % — SIGNIFICANT CHANGE UP (ref 39–50)
HGB BLD-MCNC: 14.2 G/DL — SIGNIFICANT CHANGE UP (ref 13–17)
IANC: 3.9 K/UL — SIGNIFICANT CHANGE UP (ref 1.8–7.4)
IMM GRANULOCYTES NFR BLD AUTO: 0.4 % — SIGNIFICANT CHANGE UP (ref 0–0.9)
LYMPHOCYTES # BLD AUTO: 3.04 K/UL — SIGNIFICANT CHANGE UP (ref 1–3.3)
LYMPHOCYTES # BLD AUTO: 37.3 % — SIGNIFICANT CHANGE UP (ref 13–44)
MAGNESIUM SERPL-MCNC: 1.9 MG/DL — SIGNIFICANT CHANGE UP (ref 1.6–2.6)
MCHC RBC-ENTMCNC: 28.6 PG — SIGNIFICANT CHANGE UP (ref 27–34)
MCHC RBC-ENTMCNC: 32.8 GM/DL — SIGNIFICANT CHANGE UP (ref 32–36)
MCV RBC AUTO: 87.3 FL — SIGNIFICANT CHANGE UP (ref 80–100)
MONOCYTES # BLD AUTO: 0.94 K/UL — HIGH (ref 0–0.9)
MONOCYTES NFR BLD AUTO: 11.5 % — SIGNIFICANT CHANGE UP (ref 2–14)
NEUTROPHILS # BLD AUTO: 3.9 K/UL — SIGNIFICANT CHANGE UP (ref 1.8–7.4)
NEUTROPHILS NFR BLD AUTO: 47.7 % — SIGNIFICANT CHANGE UP (ref 43–77)
NRBC # BLD: 0 /100 WBCS — SIGNIFICANT CHANGE UP (ref 0–0)
NRBC # FLD: 0 K/UL — SIGNIFICANT CHANGE UP (ref 0–0)
PHOSPHATE SERPL-MCNC: 3.5 MG/DL — SIGNIFICANT CHANGE UP (ref 2.5–4.5)
PLATELET # BLD AUTO: 251 K/UL — SIGNIFICANT CHANGE UP (ref 150–400)
POTASSIUM SERPL-MCNC: 4.2 MMOL/L — SIGNIFICANT CHANGE UP (ref 3.5–5.3)
POTASSIUM SERPL-SCNC: 4.2 MMOL/L — SIGNIFICANT CHANGE UP (ref 3.5–5.3)
PROT SERPL-MCNC: 6.2 G/DL — SIGNIFICANT CHANGE UP (ref 6–8.3)
RBC # BLD: 4.96 M/UL — SIGNIFICANT CHANGE UP (ref 4.2–5.8)
RBC # FLD: 14.7 % — HIGH (ref 10.3–14.5)
SODIUM SERPL-SCNC: 138 MMOL/L — SIGNIFICANT CHANGE UP (ref 135–145)
VIT D25+D1,25 OH+D1,25 PNL SERPL-MCNC: 47 PG/ML — SIGNIFICANT CHANGE UP (ref 19.9–79.3)
WBC # BLD: 8.16 K/UL — SIGNIFICANT CHANGE UP (ref 3.8–10.5)
WBC # FLD AUTO: 8.16 K/UL — SIGNIFICANT CHANGE UP (ref 3.8–10.5)

## 2022-11-03 PROCEDURE — 99232 SBSQ HOSP IP/OBS MODERATE 35: CPT

## 2022-11-03 PROCEDURE — 99233 SBSQ HOSP IP/OBS HIGH 50: CPT

## 2022-11-03 RX ADMIN — ENOXAPARIN SODIUM 40 MILLIGRAM(S): 100 INJECTION SUBCUTANEOUS at 06:08

## 2022-11-03 NOTE — PROGRESS NOTE ADULT - PROBLEM SELECTOR PLAN 3
On admission, paresthesias of left hand and foot    -11/02 AM: pt reports continued paresthesias of left hand and foot  -11/01 AM: pt reports continued paresthesias of left hand and foot  -10/31 AM: pt reports continued paresthesias of left hand and foot  -10/30 AM: pt reports continued paresthesias of left hand and foot  -see above problem for neuro ddx + workup On admission, paresthesias of left hand and foot    -11/03 AM: pt reports continued paresthesias of left hand and foot  -11/02 AM: pt reports continued paresthesias of left hand and foot  -11/01 AM: pt reports continued paresthesias of left hand and foot  -10/31 AM: pt reports continued paresthesias of left hand and foot  -10/30 AM: pt reports continued paresthesias of left hand and foot  -see above problem for neuro ddx + workup

## 2022-11-03 NOTE — PROGRESS NOTE ADULT - PROBLEM SELECTOR PLAN 2
On admission, pt p/w left foot drop. 5/5 Str on exam but gait unstable with left foot high steppage    11/2 AM: 5/5 dorsi/plantarflexion b/l on exam, dysmetria on FTN b/l, gait exam defered by pt  11/1 AM: 5/5 dorsi/plantarflexion b/l on exam, unstable gait with high steppage in left foot, dysmetria on FTN b/l, b/l patellar hyperreflexia  10/31 AM: 5/5 dorsi/plantarflexion b/l on exam, unstable gait with high steppage in left foot, dysmetria on FTN b/l, b/l patellar hyperreflexia  10/30 AM: 5/5 dorsi/plantarflexion b/l on exam,   - f/u MR Brain and chong-spine (will receive Ativan as premedication)  -neurology following: pt's presentation possibly due to peripheral nerve disease, spinal cord compression/radiculopathy, transverse myelitis, peroneal nerve injury (would affect dorsiflexion and foot eversion), autoimmune diseases like ALS, Muscular dystrophy, etc   -workup per neurology negative thus far (negative syphilis, lyme, diabetes, b12/folate)  - Plan for MRI with ativan premedication  -patient will need outpatient EMG testing   -fall precautions, ambulate with assistance  -PT/OT both recommending outpatient f/u. On admission, pt p/w left foot drop. 5/5 Str on exam but gait unstable with left foot high steppage    -ortho consulted for management recommendations based on MR spine findings, per neuro recommendation  11/3 AM: 5/5 dorsi/plantarflexion b/l on exam, unstable gait with high steppage in left foot, dysmetria on FTN b/l, b/l patellar hyperreflexia  11/2 AM: 5/5 dorsi/plantarflexion b/l on exam, dysmetria on FTN b/l, gait exam defered by pt  11/1 AM: 5/5 dorsi/plantarflexion b/l on exam, unstable gait with high steppage in left foot, dysmetria on FTN b/l, b/l patellar hyperreflexia  10/31 AM: 5/5 dorsi/plantarflexion b/l on exam, unstable gait with high steppage in left foot, dysmetria on FTN b/l, b/l patellar hyperreflexia  10/30 AM: 5/5 dorsi/plantarflexion b/l on exam,   - f/u MR Brain and chong-spine (will receive Ativan as premedication)  -neurology following: pt's presentation possibly due to peripheral nerve disease, spinal cord compression/radiculopathy, transverse myelitis, peroneal nerve injury (would affect dorsiflexion and foot eversion), autoimmune diseases like ALS, Muscular dystrophy, etc   -workup per neurology negative thus far (negative syphilis, lyme, diabetes, b12/folate)  - Plan for MRI with ativan premedication  -patient will need outpatient EMG testing   -fall precautions, ambulate with assistance  -PT/OT both recommending outpatient f/u.

## 2022-11-03 NOTE — CONSULT NOTE ADULT - SUBJECTIVE AND OBJECTIVE BOX
Orthopedic Spine Consult Note    Patient is a 57y Male who presents with 3 years of RUE weakness and gait unsteadiness after fall onto back. Denies seeing spine surgeon in past. No acute changes in RUE weakness or gait unsteadiness. Also reports new onset numbness L sole of foot for past 3 days.. Denies pain/injury elsewhere. Denies tingling/paresthesia. Denies bowel/bladder incontinence. No saddle anesthesia. Denies fevers/chills. No other complaints at this time.    HEALTH ISSUES - PROBLEM Dx:  Foot drop, left    Numbness and tingling    Chronic back pain    Prophylactic measure    Cervical spondylosis with myelopathy    Cervical spondylosis            MEDICATIONS  (STANDING):  enoxaparin Injectable 40 milliGRAM(s) SubCutaneous every 24 hours      Allergies    No Known Allergies    Intolerances        PAST MEDICAL & SURGICAL HISTORY:  No pertinent past medical history    Chronic back pain    History of contracture of joint    No significant past surgical history                              14.2   8.16  )-----------( 251      ( 03 Nov 2022 07:23 )             43.3       03 Nov 2022 07:23    138    |  105    |  16     ----------------------------<  98     4.2     |  24     |  0.80     Ca    8.9        03 Nov 2022 07:23  Phos  3.5       03 Nov 2022 07:23  Mg     1.90      03 Nov 2022 07:23    TPro  6.2    /  Alb  3.8    /  TBili  <0.2   /  DBili  x      /  AST  25     /  ALT  26     /  AlkPhos  58     03 Nov 2022 07:23              Vital Signs Last 24 Hrs  T(C): 36.8 (11-03-22 @ 06:17), Max: 36.8 (11-02-22 @ 12:10)  T(F): 98.2 (11-03-22 @ 06:17), Max: 98.2 (11-02-22 @ 12:10)  HR: 81 (11-03-22 @ 06:17) (81 - 96)  BP: 99/65 (11-03-22 @ 06:17) (99/65 - 118/64)  BP(mean): --  RR: 17 (11-03-22 @ 06:17) (17 - 18)  SpO2: 100% (11-03-22 @ 06:17) (100% - 100%)      Physical exam  Gen: NAD  Resp: no increased WOB on RA  Spine PE:  Skin intact  No gross deformity  No midnline TTP C/T/L/S spine  No bony step offs  No paraspinal muscle ttp/hypertonicity   Negative clonus  Negative babinski  Negative garcia  + rectal tone  No saddle anesthesia    Motor:                   C5                C6              C7               C8           T1   R            3+/5           3+/5            3+/5             3+/5          4/5  L             5/5               5/5             5/5             5/5          5/5                L2             L3             L4               L5            S1  R         5/5           5/5          5/5             5/5           5/5  L          5/5          5/5           5/5             5/5           5/5    Sensory:            C5         C6         C7      C8       T1        (0=absent, 1=impaired, 2=normal, NT=not testable)  R         2            2           2        2         2  L          2            2           2        2         2               L2          L3         L4      L5       S1         (0=absent, 1=impaired, 2=normal, NT=not testable)  R         2            2            2        2        2  L          2            2           2        2         2    Imaging:    A/P: 57y Male with chronic cervical myelopathy. No acute changes or emergent findings. Will d/w attending further management.  Pain control  WBAT with assistive devices as needed  FU Labs/imaging  SCDs Orthopedic Spine Consult Note    Patient is a 57y Male who presents with 3 years of RUE weakness and gait unsteadiness after fall onto back. Denies seeing spine surgeon in past. No acute changes in RUE weakness or gait unsteadiness. Also reports new onset numbness L sole of foot for past 3 days.. Denies pain/injury elsewhere. Denies tingling/paresthesia. Denies bowel/bladder incontinence. No saddle anesthesia. Denies fevers/chills. No other complaints at this time.    HEALTH ISSUES - PROBLEM Dx:  Foot drop, left    Numbness and tingling    Chronic back pain    Prophylactic measure    Cervical spondylosis with myelopathy    Cervical spondylosis            MEDICATIONS  (STANDING):  enoxaparin Injectable 40 milliGRAM(s) SubCutaneous every 24 hours      Allergies    No Known Allergies    Intolerances        PAST MEDICAL & SURGICAL HISTORY:  No pertinent past medical history    Chronic back pain    History of contracture of joint    No significant past surgical history                              14.2   8.16  )-----------( 251      ( 03 Nov 2022 07:23 )             43.3       03 Nov 2022 07:23    138    |  105    |  16     ----------------------------<  98     4.2     |  24     |  0.80     Ca    8.9        03 Nov 2022 07:23  Phos  3.5       03 Nov 2022 07:23  Mg     1.90      03 Nov 2022 07:23    TPro  6.2    /  Alb  3.8    /  TBili  <0.2   /  DBili  x      /  AST  25     /  ALT  26     /  AlkPhos  58     03 Nov 2022 07:23              Vital Signs Last 24 Hrs  T(C): 36.8 (11-03-22 @ 06:17), Max: 36.8 (11-02-22 @ 12:10)  T(F): 98.2 (11-03-22 @ 06:17), Max: 98.2 (11-02-22 @ 12:10)  HR: 81 (11-03-22 @ 06:17) (81 - 96)  BP: 99/65 (11-03-22 @ 06:17) (99/65 - 118/64)  BP(mean): --  RR: 17 (11-03-22 @ 06:17) (17 - 18)  SpO2: 100% (11-03-22 @ 06:17) (100% - 100%)      Physical exam  Gen: NAD  Resp: no increased WOB on RA  Spine PE:  Skin intact  No gross deformity  No midnline TTP C/T/L/S spine  No bony step offs  No paraspinal muscle ttp/hypertonicity   Negative clonus  Negative babinski  Positive garcia's bilaterally  + rectal tone  No saddle anesthesia    Motor:                   C5                C6              C7               C8           T1   R            3+/5           3+/5            3+/5             3+/5          4/5  L             5/5               5/5             5/5             4/5          4/5                L2             L3             L4               L5            S1  R         5/5           5/5          5/5             5/5           5/5  L          5/5          5/5           5/5             5/5           5/5    Sensory:            C5         C6         C7      C8       T1        (0=absent, 1=impaired, 2=normal, NT=not testable)  R         2            2           2        2         2  L          2            2           2        2         2               L2          L3         L4      L5       S1         (0=absent, 1=impaired, 2=normal, NT=not testable)  R         2            2            2        2        2  L          2            2           2        2         2    Imaging:    A/P: 57y Male with chronic cervical myelopathy. No acute changes or emergent findings. Will d/w attending further management.  Pain control  WBAT with assistive devices as needed  FU Labs/imaging  SCDs

## 2022-11-03 NOTE — DISCHARGE NOTE PROVIDER - NSDCFUSCHEDAPPT_GEN_ALL_CORE_FT
Marco Casillas  [facility]  ORTHOSUR[address]  Scheduled Appointment: 12/14/2022

## 2022-11-03 NOTE — PROGRESS NOTE ADULT - PROBLEM SELECTOR PLAN 1
Pt with prior work accident (2019) resulting in chronic back pain and baseline R hand contracture + weakness. Pt with evidence of cervical spondylosis on MR this admission.    -Will f/u neuro regarding workup and management recommendations. Pt with prior work accident (2019) resulting in chronic back pain and baseline R hand contracture + weakness. Pt with evidence of cervical spondylosis on MR this admission.    - ortho consulted for management recommendations

## 2022-11-03 NOTE — CONSULT NOTE ADULT - TIME BILLING
Please note that over 50 minutes of time was spent in care of this patient including   - previsit preparation  - in person visit   - post visit documentation  - review of imaging  - discussion with colleagues

## 2022-11-03 NOTE — DISCHARGE NOTE PROVIDER - NSDCMRMEDTOKEN_GEN_ALL_CORE_FT
Occupational Therapy: Outpatient Occupational Therapy  Physical Therapy: Outpatient Physical Therapy   baclofen 5 mg oral tablet: 1 tab(s) orally every 8 hours, As needed, Musculoskeletal Pain  enoxaparin: 40 unit(s) subcutaneous once a day  Occupational Therapy: Outpatient Occupational Therapy  Physical Therapy: Outpatient Physical Therapy   acetaminophen 325 mg oral tablet: 3 tab(s) orally every 8 hours, As Needed  cyclobenzaprine 10 mg oral tablet: 1 tab(s) orally every 8 hours, As Needed  gabapentin 100 mg oral capsule: 1 cap(s) orally 3 times a day  oxyCODONE 5 mg oral tablet: 1 or 2tab(s) orally every 4 hours, as needed for moderate to severe pain MDD:8  pantoprazole 40 mg oral delayed release tablet: 1 tab(s) orally once a day (before a meal) MDD:1  polyethylene glycol 3350 oral powder for reconstitution: 17 gram(s) orally every 12 hours, As Needed  Rolling Walker: Dx: PSF C2-T2        MERLIN:  senna leaf extract oral tablet: 2 tab(s) orally once a day (at bedtime)

## 2022-11-03 NOTE — PROGRESS NOTE ADULT - ASSESSMENT
57 year old male with hx of chronic back pain and right sided hand/wrist contracture after a work related injury presenting to the hospital for paresthesias of the left hand/fingers/foot with related left foot drop and difficulty ambulating, found to have cervical and lumbar degenerative disc disease on CT scan.  MR Head and chong-spine performed, will f/u with neuro regarding next steps for pt's workup and management. Patient will need outpatient EMG testing after discharge. PT/OT both recommending outpatient f/u. 57 year old male with hx of chronic back pain and right sided hand/wrist contracture after a work related injury presenting to the hospital for paresthesias of the left hand/fingers/foot with related left foot drop and difficulty ambulating, found to have cervical and lumbar degenerative disc disease on CT scan. Neuro following, MR Head and chong-spine performed, ortho consulted for management recommendations. Patient will need outpatient EMG testing after discharge. PT/OT both recommending outpatient f/u.

## 2022-11-03 NOTE — PROGRESS NOTE ADULT - PROBLEM SELECTOR PLAN 4
-likely secondary to degenerative disc disease seen on CT   -no acute symptoms that require urgent surgical consultation or intervention   -will get physical therapy and occupational therapy evaluations. patient is not currently experiencing pain and does not request pain medication. -ortho consulted for management recommendations  -likely secondary to degenerative disc disease seen on CT   -no acute symptoms that require urgent surgical consultation or intervention   -will get physical therapy and occupational therapy evaluations. patient is not currently experiencing pain and does not request pain medication.

## 2022-11-03 NOTE — DISCHARGE NOTE PROVIDER - NSDCCPCAREPLAN_GEN_ALL_CORE_FT
PRINCIPAL DISCHARGE DIAGNOSIS  Diagnosis: Foot drop, left  Assessment and Plan of Treatment:        PRINCIPAL DISCHARGE DIAGNOSIS  Diagnosis: Foot drop, left  Assessment and Plan of Treatment: You should follow up outpatient with neurology and be sure to request an EMG      SECONDARY DISCHARGE DIAGNOSES  Diagnosis: Cervical spondylosis with myelopathy  Assessment and Plan of Treatment: When you came to the hospital, you had days-weeks of tingling in your L hand and feet, as well as weakness in your left foot, on top of your pre-existing back pain for several years. MRI of the spine showed various abnormalities, for which ortho was consulted. They recommended an anterior cervical corpectomy and fusion with a posterior cervical fusion. The surgery is planned for __________.

## 2022-11-03 NOTE — CONSULT NOTE ADULT - ATTENDING COMMENTS
I had a lengthy discussion with the patient today. He has had issues with walking and unsteadiness on his feet for 2+ months. He has had significant right hand weakness since a fall at his work years ago. He does feel increased numbness over his fingers as well for weeks. He denies any bowel/bladder incontinence. He denies any saddle anesthesia.    The patient has cervical spondylotic myelopathy in the setting of severe cervical spinal stenosis. I do think he would benefit from surgery. I did emphasize the high risk nature of this surgery and the potential for neurologic decline. He expressed understanding and he will discuss with his family. I did write down his diagnosis and treatment plan for him to review as well.
Fall in 2019 with development of right sided weakness - worse in the hand.  He was told that he needed cervical spine surgery but he refused.  He showed me a photo of an MRI - sagittal T2 image of the cervical spine which was done at that time and there is significant multilevel central canal stenosis with complete loss of CSF signal anteriorly and posteriorly.   He has had slow worsening of weakness on the right side and has now developed symptoms on the left side.  He has painful spasm/cramps in the legs.     Exam:  Severe spasticity in the legs, B.   Slow RSM in the fingers and feet - worse on the right.   Strength:  R/L  Infraspinatus 4/4+  Delt: 4/5  Biceps: 4/5  Triceps: 4-/4+  WE 4-/4+  FF 4+/5  HF 4-/4  KF 4+/4+  Ankle DF 4+/4+  Reflexes:   R/L  Ankles: 5/4  Biceps: 0/3 with spread to FF  BR: 3 with spread to FF  B Babinski signs.   Left pec major 3    Vitamin B12, Serum: 342 pg/mL (10.30.22 @ 06:30)       A/P  Mr. Singleton is a 56 yo man with severe cervical spondylotic myelopathy.  MRI cervical spine - no need for any other imaging unless recommended by spine service (they may want an MRI of the entire spine)  Start baclofen 5 mg BID for 1 day then 10 mg BID for painful spasms.  Consult with spine service.   B12 1000 mcg daily  Syphilis screen.  Thank you  D/W patient  D/W neurology resident who will d/w primary team.

## 2022-11-03 NOTE — DISCHARGE NOTE PROVIDER - CARE PROVIDER_API CALL
Marco Casillas (MD)  [facility]  [address], [address]  [place] [address]  Phone: [phone]  Fax: [phone]  Follow Up Time: 2 weeks

## 2022-11-03 NOTE — PROGRESS NOTE ADULT - SUBJECTIVE AND OBJECTIVE BOX
Interval hx:    MEDICATIONS  (STANDING):  enoxaparin Injectable 40 milliGRAM(s) SubCutaneous every 24 hours    MEDICATIONS  (PRN):    Vital Signs (24 Hrs):  T(C): 36.8 (11-03-22 @ 06:17), Max: 36.8 (11-02-22 @ 12:10)  HR: 81 (11-03-22 @ 06:17) (81 - 96)  BP: 99/65 (11-03-22 @ 06:17) (99/65 - 118/64)  RR: 17 (11-03-22 @ 06:17) (17 - 18)  SpO2: 100% (11-03-22 @ 06:17) (100% - 100%)  Wt(kg): --  Daily     Daily     I&O's Summary    02 Nov 2022 07:01  -  03 Nov 2022 06:40  --------------------------------------------------------  IN: 600 mL / OUT: 0 mL / NET: 600 mL    Physical Exam:     Interval hx: No acute events overnight, ortho consulted in AM for management given patient's MRI pan spine findings. Pt continues to endorse paresthesias in left hand and foot and weakness in left foot, continues to endorse difficulty with gait but is able to ambulate to bathroom.     MEDICATIONS  (STANDING):  enoxaparin Injectable 40 milliGRAM(s) SubCutaneous every 24 hours    MEDICATIONS  (PRN):    Vital Signs (24 Hrs):  T(C): 36.8 (11-03-22 @ 06:17), Max: 36.8 (11-02-22 @ 12:10)  HR: 81 (11-03-22 @ 06:17) (81 - 96)  BP: 99/65 (11-03-22 @ 06:17) (99/65 - 118/64)  RR: 17 (11-03-22 @ 06:17) (17 - 18)  SpO2: 100% (11-03-22 @ 06:17) (100% - 100%)  Wt(kg): --  Daily     Daily     I&O's Summary    02 Nov 2022 07:01  -  03 Nov 2022 06:40  --------------------------------------------------------  IN: 600 mL / OUT: 0 mL / NET: 600 mL    Physical Exam:  Constitutional: NAD, awake and alert  Respiratory: Breath sounds are clear bilaterally, No wheezing, rales or rhonchi  Cardiovascular: S1 and S2, regular rate and rhythm, no Murmurs, gallops or rubs  Gastrointestinal: Bowel Sounds present, soft, nontender, nondistended, no guarding, no rebound  Extremities: No LE edema b/l  Neurological: AAOX3, PERRLA, EOMI, 5/5 dorsi/plantarflexion b/l, decreased  strength and 4/5 str arm extension on R side (R side weakness is baseline deficit), normal  str and arm str on L side, Dysmetria on FTN b/l, gait unstable with slightly higher steppage in left foot, pt b/l patellar hyperreflexia [unchanged from prior exam]  Musculoskeletal: CLAROS x 4  Skin: No cyanosis or pallor    LABS:  cret                        14.2   8.16  )-----------( 251      ( 03 Nov 2022 07:23 )             43.3     11-03    138  |  105  |  16  ----------------------------<  98  4.2   |  24  |  0.80    Ca    8.9      03 Nov 2022 07:23  Phos  3.5     11-03  Mg     1.90     11-03    TPro  6.2  /  Alb  3.8  /  TBili  <0.2  /  DBili  x   /  AST  25  /  ALT  26  /  AlkPhos  58  11-03

## 2022-11-03 NOTE — DISCHARGE NOTE PROVIDER - NSDCFUADDINST_GEN_ALL_CORE_FT
Patient can follow up with general neurology at [address] [place] [address] after discharge. Please instruct the patient to call [phone] to schedule this appointment.  Please supplement B12 1000 mcg daily.   Schedule outpatient PT/OT follow-up appointments

## 2022-11-03 NOTE — DISCHARGE NOTE PROVIDER - NSFOLLOWUPCLINICS_GEN_ALL_ED_FT
[facility]  Neurology  [address] [place] [address] [address]rd [place] [address]  Phone: [phone]  Fax:   Follow Up Time: 2 weeks

## 2022-11-03 NOTE — CHART NOTE - NSCHARTNOTEFT_GEN_A_CORE
Please follow up with myelopathy labs ( TSH, T3, Free T4, A1c, LDL, Vitamin B6, B12, CK, ESR, CRP, Mg, Ph, Zn, Cu, Lyme, syphilis; pending: B1, B9, D3, Vit E) and HIV, HTLV, vit E, methylmalonic acid)     Also, please follow up with neurosurgery for any further recs regarding MRI spine. Please supplement B12 1000 mcg daily.     No further inpatient neurological workup recommended at that time.     Patient can follow up with general neurology at [address] [place] [address] after discharge. Please instruct the patient to call [phone] to schedule this appointment.     Please call neurology if needed.     Daniel Marin MD  Neurology PGY-3 Please follow up with myelopathy labs ( TSH, T3, Free T4, A1c, LDL, Vitamin B6, B12, CK, ESR, CRP, Mg, Ph, Zn, Cu, Lyme, syphilis; pending: B1, B9, D3, Vit E) and HIV, HTLV, vit E, methylmalonic acid)     Also, please follow up with neurosurgery for any further recs regarding MRI spine. Please supplement B12 1000 mcg daily.     No further inpatient neurological workup recommended at that time.     Patient can follow up with general neurology at [address] [place] [address] after discharge. Please instruct the patient to call [phone] to schedule this appointment.     Please call neurology if needed.     Daniel Marin MD  Neurology PGY-3      -------------  Attending Addendum:  Patient was seen 11/3/22.    Pt reports L hand numbness and L foot drop and sharp neck pain.    Exam:  Awake, alert, oriented to situation, answers questions and follows commands appropriately  fluent speech, comprehension intact  EOMI, face symmetric, no dysarthria  RUE worse then LUE weakness, R hand contracted - pt states R-sided weakness was baseline, L-sided weakness was new prompting this presentation. BLE 5/5 strength.  Reflexes 2+ b/l biceps, BR, triceps. L pectoral present, no R pectoral jerk. 3+ b/l patellar with suprapatellar and crossed adductor responses. Plantars upgoing b/l.  Coord: RUE>LUE dysmetria  Gait unsteady. narrow base.    Assessment:  Severe cervical spondylotic myelopathy    Recommendations:  -baclofen and naproxen for pain  -myelopathy labs as above  -B12 supplementation  -follow up with spine surgery service  -follow up with neurology clinic    Kate Dong MD

## 2022-11-04 PROCEDURE — 72040 X-RAY EXAM NECK SPINE 2-3 VW: CPT | Mod: 26

## 2022-11-04 PROCEDURE — 99232 SBSQ HOSP IP/OBS MODERATE 35: CPT

## 2022-11-04 RX ORDER — BACLOFEN 100 %
5 POWDER (GRAM) MISCELLANEOUS EVERY 8 HOURS
Refills: 0 | Status: DISCONTINUED | OUTPATIENT
Start: 2022-11-04 | End: 2022-11-06

## 2022-11-04 RX ADMIN — ENOXAPARIN SODIUM 40 MILLIGRAM(S): 100 INJECTION SUBCUTANEOUS at 06:03

## 2022-11-04 NOTE — PROGRESS NOTE ADULT - PROBLEM SELECTOR PLAN 3
On admission, paresthesias of left hand and foot    -11/03 AM: pt reports continued paresthesias of left hand and foot  -11/02 AM: pt reports continued paresthesias of left hand and foot  -11/01 AM: pt reports continued paresthesias of left hand and foot  -10/31 AM: pt reports continued paresthesias of left hand and foot  -10/30 AM: pt reports continued paresthesias of left hand and foot  -see above problem for neuro ddx + workup On admission, paresthesias of left hand and foot    -11/04 AM: pt reports continued paresthesias of left hand and foot  -11/03 AM: pt reports continued paresthesias of left hand and foot  -11/02 AM: pt reports continued paresthesias of left hand and foot  -11/01 AM: pt reports continued paresthesias of left hand and foot  -10/31 AM: pt reports continued paresthesias of left hand and foot  -10/30 AM: pt reports continued paresthesias of left hand and foot  -see above problem for neuro ddx + workup

## 2022-11-04 NOTE — DIETITIAN INITIAL EVALUATION ADULT - OTHER INFO
57 year old male with a PMH of chronic back pain presenting to the hospital for paresthesias of the left hand/fingers/foot with related left foot drop and difficulty ambulating, found to have cervical and lumbar degenerative disc disease on CT scan per chart.    Patient reports good appetite in house. Per PCA, patient consuming food mostly from home. No GI distress or chewing/swallowing difficulties reported. Has no food allergies, but avoids beef/pork. States UBW is between 135-140s. ABW is 138 lbs. (10/30) per chart indicating stable weight. No edema or pressure injuries noted per RN flow sheet.    Patient w/ no questions regarding nutrition at this time.

## 2022-11-04 NOTE — PROGRESS NOTE ADULT - SUBJECTIVE AND OBJECTIVE BOX
Interval hx:    MEDICATIONS  (STANDING):  enoxaparin Injectable 40 milliGRAM(s) SubCutaneous every 24 hours    MEDICATIONS  (PRN):    Vital Signs (24 Hrs):  T(C): 36.7 (11-04-22 @ 05:42), Max: 37 (11-03-22 @ 19:50)  HR: 81 (11-04-22 @ 05:42) (81 - 100)  BP: 104/72 (11-04-22 @ 05:42) (90/75 - 121/79)  RR: 18 (11-04-22 @ 05:42) (18 - 19)  SpO2: 100% (11-04-22 @ 05:42) (100% - 100%)  Wt(kg): --  Daily     Daily     I&O's Summary    02 Nov 2022 07:01  -  03 Nov 2022 07:00  --------------------------------------------------------  IN: 600 mL / OUT: 0 mL / NET: 600 mL    Physical Exam:   Interval hx: NAEON. Pt reporting continued back/neck pain, left hand/foot paresthesias, and left foot weakness.    MEDICATIONS  (STANDING):  enoxaparin Injectable 40 milliGRAM(s) SubCutaneous every 24 hours    MEDICATIONS  (PRN):    Vital Signs (24 Hrs):  T(C): 36.7 (11-04-22 @ 05:42), Max: 37 (11-03-22 @ 19:50)  HR: 81 (11-04-22 @ 05:42) (81 - 100)  BP: 104/72 (11-04-22 @ 05:42) (90/75 - 121/79)  RR: 18 (11-04-22 @ 05:42) (18 - 19)  SpO2: 100% (11-04-22 @ 05:42) (100% - 100%)    I&O's Summary    02 Nov 2022 07:01  -  03 Nov 2022 07:00  --------------------------------------------------------  IN: 600 mL / OUT: 0 mL / NET: 600 mL    Physical Exam:  Constitutional: NAD, awake and alert  Respiratory: Breath sounds are clear bilaterally, No wheezing, rales or rhonchi  Cardiovascular: S1 and S2, regular rate and rhythm, no Murmurs, gallops or rubs  Gastrointestinal: Bowel Sounds present, soft, nontender, nondistended, no guarding, no rebound  Extremities: No LE edema b/l  Neurological: AAOX3, PERRLA, EOMI, 5/5 dorsi/plantarflexion b/l, decreased  strength and 4/5 str arm extension on R side (R side weakness is baseline deficit), normal  str and arm str on L side, Dysmetria on FTN b/l, gait unstable, pt b/l patellar hyperreflexia [unchanged from prior exam]  Musculoskeletal: CLAROS x 4  Skin: No cyanosis or pallor    Lab holiday 11/04 AM, plan for labs 11/05 AM

## 2022-11-04 NOTE — DIETITIAN INITIAL EVALUATION ADULT - PERTINENT LABORATORY DATA
11-03    138  |  105  |  16  ----------------------------<  98  4.2   |  24  |  0.80    Ca    8.9      03 Nov 2022 07:23  Phos  3.5     11-03  Mg     1.90     11-03    TPro  6.2  /  Alb  3.8  /  TBili  <0.2  /  DBili  x   /  AST  25  /  ALT  26  /  AlkPhos  58  11-03  A1C with Estimated Average Glucose Result: 5.3 % (10-30-22 @ 06:30)

## 2022-11-04 NOTE — MEDICAL STUDENT PROGRESS NOTE(EDUCATION) - SUBJECTIVE AND OBJECTIVE BOX
Subjective:  No acute events o/n. Patient reporting increased neck pain 8/10. Pt wants to have surgery as soon as possible.     Objective:    Vitals:   T(C): 36.7 (11-04-22 @ 05:42), Max: 37 (11-03-22 @ 19:50)  HR: 81 (11-04-22 @ 05:42) (81 - 100)  BP: 104/72 (11-04-22 @ 05:42) (90/75 - 121/79)  RR: 18 (11-04-22 @ 05:42) (18 - 19)  SpO2: 100% (11-04-22 @ 05:42) (100% - 100%)    Medications:  baclofen 5 milliGRAM(s) Oral every 8 hours PRN  enoxaparin Injectable 40 milliGRAM(s) SubCutaneous every 24 hours

## 2022-11-04 NOTE — MEDICAL STUDENT PROGRESS NOTE(EDUCATION) - NS MD HP STUD ASPLAN ASSES FT
57 year old male with hx of chronic back pain and right sided hand/wrist contracture after a work related injury presenting to the hospital for paresthesias of the left hand/fingers/foot with related left foot drop and difficulty ambulating, found to have cervical and lumbar degenerative disc disease on CT scan. Neuro recommending outpatient neuro f/u and EMG testing after discharge. PT/OT both recommending outpatient f/u after discharge. Because of MR panspine findings, Ortho was consulted - recommending anterior cervical corpectomy and fusion with a posterior cervical spinal fusion. Pt would like to get the surgery ASAP. CT Cervical Spine and flexion/extension Cervical spine XRs ordered for surgical planning.

## 2022-11-04 NOTE — PROGRESS NOTE ADULT - PROBLEM SELECTOR PLAN 2
On admission, pt p/w left foot drop. 5/5 Str on exam but gait unstable with left foot high steppage    -ortho consulted for management recommendations based on MR spine findings, per neuro recommendation  11/3 AM: 5/5 dorsi/plantarflexion b/l on exam, unstable gait with high steppage in left foot, dysmetria on FTN b/l, b/l patellar hyperreflexia  11/2 AM: 5/5 dorsi/plantarflexion b/l on exam, dysmetria on FTN b/l, gait exam defered by pt  11/1 AM: 5/5 dorsi/plantarflexion b/l on exam, unstable gait with high steppage in left foot, dysmetria on FTN b/l, b/l patellar hyperreflexia  10/31 AM: 5/5 dorsi/plantarflexion b/l on exam, unstable gait with high steppage in left foot, dysmetria on FTN b/l, b/l patellar hyperreflexia  10/30 AM: 5/5 dorsi/plantarflexion b/l on exam,   - f/u MR Brain and chong-spine (will receive Ativan as premedication)  -neurology following: pt's presentation possibly due to peripheral nerve disease, spinal cord compression/radiculopathy, transverse myelitis, peroneal nerve injury (would affect dorsiflexion and foot eversion), autoimmune diseases like ALS, Muscular dystrophy, etc   -workup per neurology negative thus far (negative syphilis, lyme, diabetes, b12/folate)  - Plan for MRI with ativan premedication  -patient will need outpatient EMG testing   -fall precautions, ambulate with assistance  -PT/OT both recommending outpatient f/u. On admission, pt p/w left foot drop. 5/5 Str on exam but gait unstable with left foot high steppage    -ortho consulted for management recommendations based on MR spine findings, per neuro recommendation  11/4 AM: 5/5 dorsi/plantarflexion b/l on exam, unstable gait with high steppage in left foot, dysmetria on FTN b/l, b/l patellar hyperreflexia  11/3 AM: 5/5 dorsi/plantarflexion b/l on exam, unstable gait with high steppage in left foot, dysmetria on FTN b/l, b/l patellar hyperreflexia  11/2 AM: 5/5 dorsi/plantarflexion b/l on exam, dysmetria on FTN b/l, gait exam defered by pt  11/1 AM: 5/5 dorsi/plantarflexion b/l on exam, unstable gait with high steppage in left foot, dysmetria on FTN b/l, b/l patellar hyperreflexia  10/31 AM: 5/5 dorsi/plantarflexion b/l on exam, unstable gait with high steppage in left foot, dysmetria on FTN b/l, b/l patellar hyperreflexia  10/30 AM: 5/5 dorsi/plantarflexion b/l on exam,   - f/u MR Brain and chong-spine (will receive Ativan as premedication)  -neurology following: pt's presentation possibly due to peripheral nerve disease, spinal cord compression/radiculopathy, transverse myelitis, peroneal nerve injury (would affect dorsiflexion and foot eversion), autoimmune diseases like ALS, Muscular dystrophy, etc   -workup per neurology negative thus far (negative syphilis, lyme, diabetes, b12/folate)  - Plan for MRI with ativan premedication  -patient will need outpatient EMG testing   -fall precautions, ambulate with assistance  -PT/OT both recommending outpatient f/u.

## 2022-11-04 NOTE — PROGRESS NOTE ADULT - PROBLEM SELECTOR PLAN 4
-ortho consulted for management recommendations  -likely secondary to degenerative disc disease seen on CT   -no acute symptoms that require urgent surgical consultation or intervention   -will get physical therapy and occupational therapy evaluations. patient is not currently experiencing pain and does not request pain medication. -ortho consulted for management recommendations, see above  -likely secondary to degenerative disc disease seen on CT   -no acute symptoms that require urgent surgical consultation or intervention   -will get physical therapy and occupational therapy evaluations. patient is not currently experiencing pain and does not request pain medication.  -Will trial PRN baclofen

## 2022-11-04 NOTE — MEDICAL STUDENT PROGRESS NOTE(EDUCATION) - NS MD HP STUD ASPLAN PLAN FT
#1 Cervical spondylosis  - CT Cervical Spine and flexion/extension   - Cervical spine XRs ordered for surgical planning  - See ortho recs    #2 Neck pain  - Baclofen 5mg q3 PRN for increased neck pain; hold for sedation/decreased LOC  - Fall precautions for sedation    #3 Dispo  - Not ready for d/c  - Shower with fall precautions ordered for today (11/4)

## 2022-11-04 NOTE — DIETITIAN INITIAL EVALUATION ADULT - PERTINENT MEDS FT
MEDICATIONS  (STANDING):  enoxaparin Injectable 40 milliGRAM(s) SubCutaneous every 24 hours    MEDICATIONS  (PRN):  baclofen 5 milliGRAM(s) Oral every 8 hours PRN Musculoskeletal Pain

## 2022-11-04 NOTE — PROGRESS NOTE ADULT - PROBLEM SELECTOR PLAN 1
Pt with prior work accident (2019) resulting in chronic back pain and baseline R hand contracture + weakness. Pt with evidence of cervical spondylosis on MR this admission.    - ortho consulted for management recommendations Pt with prior work accident (2019) resulting in chronic back pain and baseline R hand contracture + weakness. Pt with evidence of cervical spondylosis on MR this admission.    - ortho consulted for management - recommending anterior cervical corpectomy and fusion with a posterior cervical spinal fusion. Pt to decide if would like to remain inpatient for surgery vs outpatient f/u.  -CT Cervical Spine and flexion/extension Cervical spine XRs ordered for surgical planning.

## 2022-11-05 LAB
ALBUMIN SERPL ELPH-MCNC: 3.6 G/DL — SIGNIFICANT CHANGE UP (ref 3.3–5)
ALP SERPL-CCNC: 44 U/L — SIGNIFICANT CHANGE UP (ref 40–120)
ALT FLD-CCNC: SIGNIFICANT CHANGE UP U/L (ref 4–41)
ANION GAP SERPL CALC-SCNC: 9 MMOL/L — SIGNIFICANT CHANGE UP (ref 7–14)
AST SERPL-CCNC: SIGNIFICANT CHANGE UP U/L (ref 4–40)
BASOPHILS # BLD AUTO: 0 K/UL — SIGNIFICANT CHANGE UP (ref 0–0.2)
BASOPHILS NFR BLD AUTO: 0 % — SIGNIFICANT CHANGE UP (ref 0–2)
BILIRUB SERPL-MCNC: 0.5 MG/DL — SIGNIFICANT CHANGE UP (ref 0.2–1.2)
BUN SERPL-MCNC: 14 MG/DL — SIGNIFICANT CHANGE UP (ref 7–23)
CALCIUM SERPL-MCNC: 8.3 MG/DL — LOW (ref 8.4–10.5)
CHLORIDE SERPL-SCNC: 104 MMOL/L — SIGNIFICANT CHANGE UP (ref 98–107)
CO2 SERPL-SCNC: 21 MMOL/L — LOW (ref 22–31)
CREAT SERPL-MCNC: 0.68 MG/DL — SIGNIFICANT CHANGE UP (ref 0.5–1.3)
EGFR: 108 ML/MIN/1.73M2 — SIGNIFICANT CHANGE UP
EOSINOPHIL # BLD AUTO: 0.07 K/UL — SIGNIFICANT CHANGE UP (ref 0–0.5)
EOSINOPHIL NFR BLD AUTO: 0.9 % — SIGNIFICANT CHANGE UP (ref 0–6)
GLUCOSE SERPL-MCNC: 80 MG/DL — SIGNIFICANT CHANGE UP (ref 70–99)
HCT VFR BLD CALC: 42 % — SIGNIFICANT CHANGE UP (ref 39–50)
HGB BLD-MCNC: 14.1 G/DL — SIGNIFICANT CHANGE UP (ref 13–17)
IANC: 3.89 K/UL — SIGNIFICANT CHANGE UP (ref 1.8–7.4)
LYMPHOCYTES # BLD AUTO: 1.64 K/UL — SIGNIFICANT CHANGE UP (ref 1–3.3)
LYMPHOCYTES # BLD AUTO: 21.9 % — SIGNIFICANT CHANGE UP (ref 13–44)
MAGNESIUM SERPL-MCNC: 1.9 MG/DL — SIGNIFICANT CHANGE UP (ref 1.6–2.6)
MCHC RBC-ENTMCNC: 28.2 PG — SIGNIFICANT CHANGE UP (ref 27–34)
MCHC RBC-ENTMCNC: 33.6 GM/DL — SIGNIFICANT CHANGE UP (ref 32–36)
MCV RBC AUTO: 84 FL — SIGNIFICANT CHANGE UP (ref 80–100)
MONOCYTES # BLD AUTO: 0.72 K/UL — SIGNIFICANT CHANGE UP (ref 0–0.9)
MONOCYTES NFR BLD AUTO: 9.6 % — SIGNIFICANT CHANGE UP (ref 2–14)
NEUTROPHILS # BLD AUTO: 4.93 K/UL — SIGNIFICANT CHANGE UP (ref 1.8–7.4)
NEUTROPHILS NFR BLD AUTO: 65.8 % — SIGNIFICANT CHANGE UP (ref 43–77)
PHOSPHATE SERPL-MCNC: SIGNIFICANT CHANGE UP MG/DL (ref 2.5–4.5)
PLATELET # BLD AUTO: SIGNIFICANT CHANGE UP K/UL (ref 150–400)
POTASSIUM SERPL-MCNC: SIGNIFICANT CHANGE UP MMOL/L (ref 3.5–5.3)
POTASSIUM SERPL-SCNC: SIGNIFICANT CHANGE UP MMOL/L (ref 3.5–5.3)
PROT SERPL-MCNC: SIGNIFICANT CHANGE UP G/DL (ref 6–8.3)
RBC # BLD: 5 M/UL — SIGNIFICANT CHANGE UP (ref 4.2–5.8)
RBC # FLD: 14.7 % — HIGH (ref 10.3–14.5)
SARS-COV-2 RNA SPEC QL NAA+PROBE: SIGNIFICANT CHANGE UP
SODIUM SERPL-SCNC: 134 MMOL/L — LOW (ref 135–145)
VIT B1 SERPL-MCNC: 121.6 NMOL/L — SIGNIFICANT CHANGE UP (ref 66.5–200)
WBC # BLD: 7.49 K/UL — SIGNIFICANT CHANGE UP (ref 3.8–10.5)
WBC # FLD AUTO: 7.49 K/UL — SIGNIFICANT CHANGE UP (ref 3.8–10.5)

## 2022-11-05 PROCEDURE — 99232 SBSQ HOSP IP/OBS MODERATE 35: CPT | Mod: GC

## 2022-11-05 PROCEDURE — 72125 CT NECK SPINE W/O DYE: CPT | Mod: 26

## 2022-11-05 RX ADMIN — Medication 5 MILLIGRAM(S): at 01:53

## 2022-11-05 RX ADMIN — ENOXAPARIN SODIUM 40 MILLIGRAM(S): 100 INJECTION SUBCUTANEOUS at 06:20

## 2022-11-05 NOTE — PROGRESS NOTE ADULT - SUBJECTIVE AND OBJECTIVE BOX
Orthopaedic Surgery Progress Note    Subjective:   Patient seen and examined. No acute events overnight. States pain is controlled.    Objective:  T(C): 36.5 (11-04-22 @ 21:46), Max: 36.7 (11-04-22 @ 05:42)  HR: 88 (11-04-22 @ 21:46) (81 - 88)  BP: 140/81 (11-04-22 @ 21:46) (104/72 - 140/81)  RR: 18 (11-04-22 @ 21:46) (18 - 18)  SpO2: 100% (11-04-22 @ 21:46) (100% - 100%)  Wt(kg): --      Physical Exam:    Gen: awake, alert, NAD  Resp: no increased work of breathing  Motor:                   C5                C6              C7               C8           T1   R            3+/5           3+/5            3+/5             3+/5          4/5  L             5/5               5/5             5/5             4/5          4/5                L2             L3             L4               L5            S1  R         5/5           5/5          5/5             5/5           5/5  L          5/5          5/5           5/5             5/5           5/5    Sensory:            C5         C6         C7      C8       T1        (0=absent, 1=impaired, 2=normal, NT=not testable)  R         2            2           2        2         2  L          2            2           2        2         2               L2          L3         L4      L5       S1         (0=absent, 1=impaired, 2=normal, NT=not testable)  R         2            2            2        2        2  L          2            2           2        2         2                            14.2   8.16  )-----------( 251      ( 03 Nov 2022 07:23 )             43.3     11-03    138  |  105  |  16  ----------------------------<  98  4.2   |  24  |  0.80    Ca    8.9      03 Nov 2022 07:23  Phos  3.5     11-03  Mg     1.90     11-03    TPro  6.2  /  Alb  3.8  /  TBili  <0.2  /  DBili  x   /  AST  25  /  ALT  26  /  AlkPhos  58  11-03

## 2022-11-05 NOTE — PROGRESS NOTE ADULT - PROBLEM SELECTOR PLAN 2
On admission, pt p/w left foot drop. 5/5 Str on exam but gait unstable with left foot high steppage    -ortho consulted for management recommendations based on MR spine findings, per neuro recommendation  11/5 AM: 5/5 dorsi/plantarflexion b/l on exam, unstable gait with high steppage in left foot, dysmetria on FTN b/l, b/l patellar hyperreflexia  11/4 AM: 5/5 dorsi/plantarflexion b/l on exam, unstable gait with high steppage in left foot, dysmetria on FTN b/l, b/l patellar hyperreflexia  11/3 AM: 5/5 dorsi/plantarflexion b/l on exam, unstable gait with high steppage in left foot, dysmetria on FTN b/l, b/l patellar hyperreflexia  11/2 AM: 5/5 dorsi/plantarflexion b/l on exam, dysmetria on FTN b/l, gait exam deferred by pt  11/1 AM: 5/5 dorsi/plantarflexion b/l on exam, unstable gait with high steppage in left foot, dysmetria on FTN b/l, b/l patellar hyperreflexia  10/31 AM: 5/5 dorsi/plantarflexion b/l on exam, unstable gait with high steppage in left foot, dysmetria on FTN b/l, b/l patellar hyperreflexia  10/30 AM: 5/5 dorsi/plantarflexion b/l on exam,   - f/u MR Brain and chong-spine (will receive Ativan as premedication)  -neurology following: pt's presentation possibly due to peripheral nerve disease, spinal cord compression/radiculopathy, transverse myelitis, peroneal nerve injury (would affect dorsiflexion and foot eversion), autoimmune diseases like ALS, Muscular dystrophy, etc   -workup per neurology negative thus far (negative syphilis, lyme, diabetes, b12/folate)  - Plan for MRI with ativan premedication  -patient will need outpatient EMG testing   -fall precautions, ambulate with assistance  -PT/OT both recommending outpatient f/u.

## 2022-11-05 NOTE — PROGRESS NOTE ADULT - PROBLEM SELECTOR PLAN 4
-ortho consulted for management recommendations, see above  -likely secondary to degenerative disc disease seen on CT   -no acute symptoms that require urgent surgical consultation or intervention   -will get physical therapy and occupational therapy evaluations. patient is not currently experiencing pain and does not request pain medication.  -Will trial PRN baclofen

## 2022-11-05 NOTE — PROGRESS NOTE ADULT - ASSESSMENT
57 year old male with hx of chronic back pain and right sided hand/wrist contracture after a work related injury presenting to the hospital for paresthesias of the left hand/fingers/foot with related left foot drop and difficulty ambulating, found to have cervical and lumbar degenerative disc disease on CT scan. Neuro recommending outpatient neuro f/u and EMG testing after discharge. PT/OT both recommending outpatient f/u after discharge. Because of MR panspine findings, Ortho was consulted - recommending anterior cervical corpectomy and fusion with a posterior cervical spinal fusion. Pt to decide if would like to remain inpatient for surgery vs outpatient f/u. CT Cervical Spine and flexion/extension Cervical spine XRs ordered for surgical planning.

## 2022-11-05 NOTE — PROGRESS NOTE ADULT - PROBLEM SELECTOR PLAN 1
Pt with prior work accident (2019) resulting in chronic back pain and baseline R hand contracture + weakness. Pt with evidence of cervical spondylosis on MR this admission.    - ortho consulted for management - recommending anterior cervical corpectomy and fusion with a posterior cervical spinal fusion. Plan to take patient to OR possibly on Tuesday.  -CT Cervical Spine and flexion/extension Cervical spine XRs showing unstable C2-4 with compression deformity in C5

## 2022-11-05 NOTE — PROGRESS NOTE ADULT - SUBJECTIVE AND OBJECTIVE BOX
***************************************************************  Elisha Charles, PGY3  Internal Medicine   pager: [phone]: [phone] [facility]: [phone]  ***************************************************************    PROGRESS NOTE:     Patient is a 57y old  Male who presents with a chief complaint of Foot drop (05 Nov 2022 03:42)      SUBJECTIVE / OVERNIGHT EVENTS:      MEDICATIONS  (STANDING):  enoxaparin Injectable 40 milliGRAM(s) SubCutaneous every 24 hours    MEDICATIONS  (PRN):  baclofen 5 milliGRAM(s) Oral every 8 hours PRN Musculoskeletal Pain      CAPILLARY BLOOD GLUCOSE        I&O's Summary      PHYSICAL EXAM:  Vital Signs Last 24 Hrs  T(C): 36.6 (05 Nov 2022 05:12), Max: 36.6 (05 Nov 2022 05:12)  T(F): 97.9 (05 Nov 2022 05:12), Max: 97.9 (05 Nov 2022 05:12)  HR: 88 (05 Nov 2022 05:12) (88 - 88)  BP: 113/70 (05 Nov 2022 05:12) (113/70 - 140/81)  BP(mean): --  RR: 17 (05 Nov 2022 05:12) (17 - 18)  SpO2: 100% (05 Nov 2022 05:12) (100% - 100%)    Parameters below as of 05 Nov 2022 05:12  Patient On (Oxygen Delivery Method): room air        CONSTITUTIONAL: NAD, well-developed  RESPIRATORY: Normal respiratory effort; lungs are clear to auscultation bilaterally  CARDIOVASCULAR: Regular rate and rhythm, normal S1 and S2, no murmur/rub/gallop; No lower extremity edema; Peripheral pulses are 2+ bilaterally  ABDOMEN: Nontender to palpation, normoactive bowel sounds, no rebound/guarding; No hepatosplenomegaly  MUSCLOSKELETAL: no clubbing or cyanosis of digits; no joint swelling or tenderness to palpation  NEURO: CN 2-12 grossly intact, moves all limbs spontaneously  PSYCH: A+O to person, place, and time; affect appropriate    LABS:                        14.2   8.16  )-----------( 251      ( 03 Nov 2022 07:23 )             43.3     11-03    138  |  105  |  16  ----------------------------<  98  4.2   |  24  |  0.80    Ca    8.9      03 Nov 2022 07:23  Phos  3.5     11-03  Mg     1.90     11-03    TPro  6.2  /  Alb  3.8  /  TBili  <0.2  /  DBili  x   /  AST  25  /  ALT  26  /  AlkPhos  58  11-03                RADIOLOGY & ADDITIONAL TESTS:  Results Reviewed:   Imaging Personally Reviewed:  Electrocardiogram Personally Reviewed:    COORDINATION OF CARE:  Care Discussed with Consultants/Other Providers [Y/N]:  Prior or Outpatient Records Reviewed [Y/N]:   ***************************************************************  Elisha Charles, PGY3  Internal Medicine   pager: NS: [phone] [facility]: [phone]  ***************************************************************    PROGRESS NOTE:     Patient is a 57y old  Male who presents with a chief complaint of Foot drop (05 Nov 2022 03:42)      SUBJECTIVE / OVERNIGHT EVENTS:      MEDICATIONS  (STANDING):  enoxaparin Injectable 40 milliGRAM(s) SubCutaneous every 24 hours    MEDICATIONS  (PRN):  baclofen 5 milliGRAM(s) Oral every 8 hours PRN Musculoskeletal Pain      CAPILLARY BLOOD GLUCOSE        I&O's Summary      PHYSICAL EXAM:  Vital Signs Last 24 Hrs  T(C): 36.6 (05 Nov 2022 05:12), Max: 36.6 (05 Nov 2022 05:12)  T(F): 97.9 (05 Nov 2022 05:12), Max: 97.9 (05 Nov 2022 05:12)  HR: 88 (05 Nov 2022 05:12) (88 - 88)  BP: 113/70 (05 Nov 2022 05:12) (113/70 - 140/81)  BP(mean): --  RR: 17 (05 Nov 2022 05:12) (17 - 18)  SpO2: 100% (05 Nov 2022 05:12) (100% - 100%)    Parameters below as of 05 Nov 2022 05:12  Patient On (Oxygen Delivery Method): room air      Constitutional: NAD, awake and alert  Respiratory: Breath sounds are clear bilaterally, No wheezing, rales or rhonchi  Cardiovascular: S1 and S2, regular rate and rhythm, no Murmurs, gallops or rubs  Gastrointestinal: Bowel Sounds present, soft, nontender, nondistended, no guarding, no rebound  Extremities: No LE edema b/l  Neurological: AAOX3, PERRLA, EOMI, 5/5 dorsi/plantarflexion b/l, decreased  strength and 4/5 str arm extension on R side (R side weakness is baseline deficit), normal  str and arm str on L side, Dysmetria on FTN b/l, gait unstable, pt b/l patellar hyperreflexia [unchanged from prior exam]  Musculoskeletal: CLAROS x 4  Skin: No cyanosis or pallor    LABS:                        14.2   8.16  )-----------( 251      ( 03 Nov 2022 07:23 )             43.3     11-03    138  |  105  |  16  ----------------------------<  98  4.2   |  24  |  0.80    Ca    8.9      03 Nov 2022 07:23  Phos  3.5     11-03  Mg     1.90     11-03    TPro  6.2  /  Alb  3.8  /  TBili  <0.2  /  DBili  x   /  AST  25  /  ALT  26  /  AlkPhos  58  11-03                RADIOLOGY & ADDITIONAL TESTS:  Results Reviewed:   Imaging Personally Reviewed:  Electrocardiogram Personally Reviewed:    COORDINATION OF CARE:  Care Discussed with Consultants/Other Providers [Y/N]:  Prior or Outpatient Records Reviewed [Y/N]:     ***************************************************************  Elisha Melvin, PGY3  Internal Medicine   pager: NS: [phone] [facility]: [phone]  ***************************************************************    PROGRESS NOTE:     Patient is a 57y old  Male who presents with a chief complaint of Foot drop (05 Nov 2022 03:42)      SUBJECTIVE / OVERNIGHT EVENTS:  No acute events overnight. Patient denies back pain, f/c, CP, SOB. He has been ambulating and having BMs. Xray showed unstable C2-4 with anterolisthesis and compression deformity in C5 with C4-T1 osteophytes. Ortho is following and will likely take to the OR next week.    MEDICATIONS  (STANDING):  enoxaparin Injectable 40 milliGRAM(s) SubCutaneous every 24 hours    MEDICATIONS  (PRN):  baclofen 5 milliGRAM(s) Oral every 8 hours PRN Musculoskeletal Pain      CAPILLARY BLOOD GLUCOSE        I&O's Summary      PHYSICAL EXAM:  Vital Signs Last 24 Hrs  T(C): 36.6 (05 Nov 2022 05:12), Max: 36.6 (05 Nov 2022 05:12)  T(F): 97.9 (05 Nov 2022 05:12), Max: 97.9 (05 Nov 2022 05:12)  HR: 88 (05 Nov 2022 05:12) (88 - 88)  BP: 113/70 (05 Nov 2022 05:12) (113/70 - 140/81)  BP(mean): --  RR: 17 (05 Nov 2022 05:12) (17 - 18)  SpO2: 100% (05 Nov 2022 05:12) (100% - 100%)    Parameters below as of 05 Nov 2022 05:12  Patient On (Oxygen Delivery Method): room air      Constitutional: NAD, awake and alert  Respiratory: Breath sounds are clear bilaterally, No wheezing, rales or rhonchi  Cardiovascular: S1 and S2, regular rate and rhythm, no Murmurs, gallops or rubs  Gastrointestinal: Bowel Sounds present, soft, nontender, nondistended, no guarding, no rebound  Extremities: No LE edema b/l  Neurological: AAOX3, PERRLA, EOMI, 5/5 dorsi/plantarflexion b/l, decreased  strength and 4/5 str arm extension on R side (R side weakness is baseline deficit), normal  str and arm str on L side, Dysmetria on FTN b/l, gait unstable, pt b/l patellar hyperreflexia [unchanged from prior exam]  Musculoskeletal: CLAROS x 4  Skin: No cyanosis or pallor    LABS:                        14.2   8.16  )-----------( 251      ( 03 Nov 2022 07:23 )             43.3     11-03    138  |  105  |  16  ----------------------------<  98  4.2   |  24  |  0.80    Ca    8.9      03 Nov 2022 07:23  Phos  3.5     11-03  Mg     1.90     11-03    TPro  6.2  /  Alb  3.8  /  TBili  <0.2  /  DBili  x   /  AST  25  /  ALT  26  /  AlkPhos  58  11-03                RADIOLOGY & ADDITIONAL TESTS:  Results Reviewed:   Imaging Personally Reviewed:  Electrocardiogram Personally Reviewed:    COORDINATION OF CARE:  Care Discussed with Consultants/Other Providers [Y/N]:  Prior or Outpatient Records Reviewed [Y/N]:

## 2022-11-05 NOTE — PROGRESS NOTE ADULT - ASSESSMENT
A/P: 57y Male with chronic cervical myelopathy. No acute changes or emergent findings. Will d/w attending further management.  - Pain control  - WBAT with assistive devices as needed  - FU Labs/imaging (CT CSp)  - SCDs

## 2022-11-05 NOTE — PROGRESS NOTE ADULT - PROBLEM SELECTOR PLAN 3
On admission, paresthesias of left hand and foot    -11/05 AM: pt reports continued paresthesias of left hand and foot  -11/04 AM: pt reports continued paresthesias of left hand and foot  -11/03 AM: pt reports continued paresthesias of left hand and foot  -11/02 AM: pt reports continued paresthesias of left hand and foot  -11/01 AM: pt reports continued paresthesias of left hand and foot  -10/31 AM: pt reports continued paresthesias of left hand and foot  -10/30 AM: pt reports continued paresthesias of left hand and foot  -see above problem for neuro ddx + workup

## 2022-11-06 ENCOUNTER — INPATIENT (INPATIENT)
Facility: HOSPITAL | Age: 57
LOS: 12 days | Discharge: HOME | DRG: 460 | End: 2022-11-19
Attending: GENERAL PRACTICE | Admitting: GENERAL PRACTICE
Payer: COMMERCIAL

## 2022-11-06 VITALS
SYSTOLIC BLOOD PRESSURE: 105 MMHG | TEMPERATURE: 98 F | RESPIRATION RATE: 18 BRPM | OXYGEN SATURATION: 100 % | DIASTOLIC BLOOD PRESSURE: 65 MMHG | HEART RATE: 85 BPM

## 2022-11-06 VITALS
DIASTOLIC BLOOD PRESSURE: 76 MMHG | OXYGEN SATURATION: 100 % | SYSTOLIC BLOOD PRESSURE: 120 MMHG | TEMPERATURE: 98 F | RESPIRATION RATE: 18 BRPM | HEART RATE: 76 BPM

## 2022-11-06 DIAGNOSIS — M54.12 RADICULOPATHY, CERVICAL REGION: ICD-10-CM

## 2022-11-06 DIAGNOSIS — M54.50 LOW BACK PAIN, UNSPECIFIED: ICD-10-CM

## 2022-11-06 DIAGNOSIS — R52 PAIN, UNSPECIFIED: ICD-10-CM

## 2022-11-06 DIAGNOSIS — R73.9 HYPERGLYCEMIA, UNSPECIFIED: ICD-10-CM

## 2022-11-06 LAB
ALBUMIN SERPL ELPH-MCNC: 3.7 G/DL — SIGNIFICANT CHANGE UP (ref 3.3–5)
ALP SERPL-CCNC: 61 U/L — SIGNIFICANT CHANGE UP (ref 40–120)
ALT FLD-CCNC: 57 U/L — HIGH (ref 4–41)
ANION GAP SERPL CALC-SCNC: 11 MMOL/L — SIGNIFICANT CHANGE UP (ref 5–17)
ANION GAP SERPL CALC-SCNC: 13 MMOL/L — SIGNIFICANT CHANGE UP (ref 7–14)
APTT BLD: 28.5 SEC — SIGNIFICANT CHANGE UP (ref 27.5–35.5)
AST SERPL-CCNC: 39 U/L — SIGNIFICANT CHANGE UP (ref 4–40)
BASOPHILS # BLD AUTO: 0.04 K/UL — SIGNIFICANT CHANGE UP (ref 0–0.2)
BASOPHILS NFR BLD AUTO: 0.5 % — SIGNIFICANT CHANGE UP (ref 0–2)
BILIRUB SERPL-MCNC: 0.3 MG/DL — SIGNIFICANT CHANGE UP (ref 0.2–1.2)
BLD GP AB SCN SERPL QL: NEGATIVE — SIGNIFICANT CHANGE UP
BUN SERPL-MCNC: 22 MG/DL — SIGNIFICANT CHANGE UP (ref 7–23)
BUN SERPL-MCNC: 22 MG/DL — SIGNIFICANT CHANGE UP (ref 7–23)
CALCIUM SERPL-MCNC: 8.9 MG/DL — SIGNIFICANT CHANGE UP (ref 8.4–10.5)
CALCIUM SERPL-MCNC: 9.2 MG/DL — SIGNIFICANT CHANGE UP (ref 8.4–10.5)
CHLORIDE SERPL-SCNC: 100 MMOL/L — SIGNIFICANT CHANGE UP (ref 96–108)
CHLORIDE SERPL-SCNC: 102 MMOL/L — SIGNIFICANT CHANGE UP (ref 98–107)
CO2 SERPL-SCNC: 22 MMOL/L — SIGNIFICANT CHANGE UP (ref 22–31)
CO2 SERPL-SCNC: 28 MMOL/L — SIGNIFICANT CHANGE UP (ref 22–31)
CREAT SERPL-MCNC: 0.88 MG/DL — SIGNIFICANT CHANGE UP (ref 0.5–1.3)
CREAT SERPL-MCNC: 0.89 MG/DL — SIGNIFICANT CHANGE UP (ref 0.5–1.3)
EGFR: 100 ML/MIN/1.73M2 — SIGNIFICANT CHANGE UP
EGFR: 100 ML/MIN/1.73M2 — SIGNIFICANT CHANGE UP
EOSINOPHIL # BLD AUTO: 0.19 K/UL — SIGNIFICANT CHANGE UP (ref 0–0.5)
EOSINOPHIL NFR BLD AUTO: 2.5 % — SIGNIFICANT CHANGE UP (ref 0–6)
GLUCOSE SERPL-MCNC: 146 MG/DL — HIGH (ref 70–99)
GLUCOSE SERPL-MCNC: 93 MG/DL — SIGNIFICANT CHANGE UP (ref 70–99)
HCT VFR BLD CALC: 43.5 % — SIGNIFICANT CHANGE UP (ref 39–50)
HCT VFR BLD CALC: 43.6 % — SIGNIFICANT CHANGE UP (ref 39–50)
HGB BLD-MCNC: 14.3 G/DL — SIGNIFICANT CHANGE UP (ref 13–17)
HGB BLD-MCNC: 14.3 G/DL — SIGNIFICANT CHANGE UP (ref 13–17)
IANC: 4.11 K/UL — SIGNIFICANT CHANGE UP (ref 1.8–7.4)
IMM GRANULOCYTES NFR BLD AUTO: 0.1 % — SIGNIFICANT CHANGE UP (ref 0–0.9)
INR BLD: 1.03 RATIO — SIGNIFICANT CHANGE UP (ref 0.88–1.16)
LYMPHOCYTES # BLD AUTO: 2.43 K/UL — SIGNIFICANT CHANGE UP (ref 1–3.3)
LYMPHOCYTES # BLD AUTO: 32.4 % — SIGNIFICANT CHANGE UP (ref 13–44)
MAGNESIUM SERPL-MCNC: 2 MG/DL — SIGNIFICANT CHANGE UP (ref 1.6–2.6)
MCHC RBC-ENTMCNC: 28.1 PG — SIGNIFICANT CHANGE UP (ref 27–34)
MCHC RBC-ENTMCNC: 28.3 PG — SIGNIFICANT CHANGE UP (ref 27–34)
MCHC RBC-ENTMCNC: 32.8 GM/DL — SIGNIFICANT CHANGE UP (ref 32–36)
MCHC RBC-ENTMCNC: 32.9 GM/DL — SIGNIFICANT CHANGE UP (ref 32–36)
MCV RBC AUTO: 85.5 FL — SIGNIFICANT CHANGE UP (ref 80–100)
MCV RBC AUTO: 86.2 FL — SIGNIFICANT CHANGE UP (ref 80–100)
MONOCYTES # BLD AUTO: 0.71 K/UL — SIGNIFICANT CHANGE UP (ref 0–0.9)
MONOCYTES NFR BLD AUTO: 9.5 % — SIGNIFICANT CHANGE UP (ref 2–14)
NEUTROPHILS # BLD AUTO: 4.11 K/UL — SIGNIFICANT CHANGE UP (ref 1.8–7.4)
NEUTROPHILS NFR BLD AUTO: 55 % — SIGNIFICANT CHANGE UP (ref 43–77)
NRBC # BLD: 0 /100 WBCS — SIGNIFICANT CHANGE UP (ref 0–0)
NRBC # BLD: 0 /100 WBCS — SIGNIFICANT CHANGE UP (ref 0–0)
NRBC # FLD: 0 K/UL — SIGNIFICANT CHANGE UP (ref 0–0)
PHOSPHATE SERPL-MCNC: 3.7 MG/DL — SIGNIFICANT CHANGE UP (ref 2.5–4.5)
PLATELET # BLD AUTO: 256 K/UL — SIGNIFICANT CHANGE UP (ref 150–400)
PLATELET # BLD AUTO: 264 K/UL — SIGNIFICANT CHANGE UP (ref 150–400)
POTASSIUM SERPL-MCNC: 3.8 MMOL/L — SIGNIFICANT CHANGE UP (ref 3.5–5.3)
POTASSIUM SERPL-MCNC: 4.2 MMOL/L — SIGNIFICANT CHANGE UP (ref 3.5–5.3)
POTASSIUM SERPL-SCNC: 3.8 MMOL/L — SIGNIFICANT CHANGE UP (ref 3.5–5.3)
POTASSIUM SERPL-SCNC: 4.2 MMOL/L — SIGNIFICANT CHANGE UP (ref 3.5–5.3)
PROT SERPL-MCNC: 6.2 G/DL — SIGNIFICANT CHANGE UP (ref 6–8.3)
PROTHROM AB SERPL-ACNC: 11.9 SEC — SIGNIFICANT CHANGE UP (ref 10.5–13.4)
RBC # BLD: 5.06 M/UL — SIGNIFICANT CHANGE UP (ref 4.2–5.8)
RBC # BLD: 5.09 M/UL — SIGNIFICANT CHANGE UP (ref 4.2–5.8)
RBC # FLD: 14.4 % — SIGNIFICANT CHANGE UP (ref 10.3–14.5)
RBC # FLD: 14.5 % — SIGNIFICANT CHANGE UP (ref 10.3–14.5)
RH IG SCN BLD-IMP: POSITIVE — SIGNIFICANT CHANGE UP
SARS-COV-2 RNA SPEC QL NAA+PROBE: SIGNIFICANT CHANGE UP
SODIUM SERPL-SCNC: 137 MMOL/L — SIGNIFICANT CHANGE UP (ref 135–145)
SODIUM SERPL-SCNC: 139 MMOL/L — SIGNIFICANT CHANGE UP (ref 135–145)
WBC # BLD: 7.19 K/UL — SIGNIFICANT CHANGE UP (ref 3.8–10.5)
WBC # BLD: 7.49 K/UL — SIGNIFICANT CHANGE UP (ref 3.8–10.5)
WBC # FLD AUTO: 7.19 K/UL — SIGNIFICANT CHANGE UP (ref 3.8–10.5)
WBC # FLD AUTO: 7.49 K/UL — SIGNIFICANT CHANGE UP (ref 3.8–10.5)

## 2022-11-06 PROCEDURE — 71045 X-RAY EXAM CHEST 1 VIEW: CPT | Mod: 26

## 2022-11-06 PROCEDURE — 93010 ELECTROCARDIOGRAM REPORT: CPT

## 2022-11-06 PROCEDURE — 99232 SBSQ HOSP IP/OBS MODERATE 35: CPT | Mod: GC

## 2022-11-06 RX ORDER — OXYCODONE HYDROCHLORIDE 5 MG/1
10 TABLET ORAL EVERY 4 HOURS
Refills: 0 | Status: DISCONTINUED | OUTPATIENT
Start: 2022-11-06 | End: 2022-11-08

## 2022-11-06 RX ORDER — ENOXAPARIN SODIUM 100 MG/ML
40 INJECTION SUBCUTANEOUS
Qty: 0 | Refills: 0 | DISCHARGE
Start: 2022-11-06

## 2022-11-06 RX ORDER — ASCORBIC ACID 60 MG
500 TABLET,CHEWABLE ORAL
Refills: 0 | Status: DISCONTINUED | OUTPATIENT
Start: 2022-11-06 | End: 2022-11-08

## 2022-11-06 RX ORDER — ONDANSETRON 8 MG/1
4 TABLET, FILM COATED ORAL EVERY 6 HOURS
Refills: 0 | Status: DISCONTINUED | OUTPATIENT
Start: 2022-11-06 | End: 2022-11-08

## 2022-11-06 RX ORDER — HEPARIN SODIUM 5000 [USP'U]/ML
5000 INJECTION INTRAVENOUS; SUBCUTANEOUS EVERY 8 HOURS
Refills: 0 | Status: COMPLETED | OUTPATIENT
Start: 2022-11-06 | End: 2022-11-07

## 2022-11-06 RX ORDER — OXYCODONE HYDROCHLORIDE 5 MG/1
5 TABLET ORAL EVERY 4 HOURS
Refills: 0 | Status: DISCONTINUED | OUTPATIENT
Start: 2022-11-06 | End: 2022-11-08

## 2022-11-06 RX ORDER — FOLIC ACID 0.8 MG
1 TABLET ORAL DAILY
Refills: 0 | Status: DISCONTINUED | OUTPATIENT
Start: 2022-11-06 | End: 2022-11-08

## 2022-11-06 RX ORDER — PANTOPRAZOLE SODIUM 20 MG/1
40 TABLET, DELAYED RELEASE ORAL
Refills: 0 | Status: DISCONTINUED | OUTPATIENT
Start: 2022-11-06 | End: 2022-11-08

## 2022-11-06 RX ORDER — ACETAMINOPHEN 500 MG
650 TABLET ORAL EVERY 6 HOURS
Refills: 0 | Status: DISCONTINUED | OUTPATIENT
Start: 2022-11-06 | End: 2022-11-08

## 2022-11-06 RX ORDER — BACLOFEN 100 %
1 POWDER (GRAM) MISCELLANEOUS
Qty: 0 | Refills: 0 | DISCHARGE
Start: 2022-11-06

## 2022-11-06 RX ADMIN — OXYCODONE HYDROCHLORIDE 5 MILLIGRAM(S): 5 TABLET ORAL at 19:30

## 2022-11-06 RX ADMIN — ENOXAPARIN SODIUM 40 MILLIGRAM(S): 100 INJECTION SUBCUTANEOUS at 05:30

## 2022-11-06 RX ADMIN — OXYCODONE HYDROCHLORIDE 5 MILLIGRAM(S): 5 TABLET ORAL at 18:58

## 2022-11-06 RX ADMIN — Medication 500 MILLIGRAM(S): at 18:44

## 2022-11-06 RX ADMIN — HEPARIN SODIUM 5000 UNIT(S): 5000 INJECTION INTRAVENOUS; SUBCUTANEOUS at 22:33

## 2022-11-06 NOTE — PROGRESS NOTE ADULT - PROBLEM SELECTOR PLAN 2
On admission, pt p/w left foot drop. 5/5 Str on exam but gait unstable with left foot high steppage    -ortho consulted for management recommendations based on MR spine findings, per neuro recommendation  11/5 AM: 5/5 dorsi/plantarflexion b/l on exam, unstable gait with high steppage in left foot, dysmetria on FTN b/l, b/l patellar hyperreflexia  11/4 AM: 5/5 dorsi/plantarflexion b/l on exam, unstable gait with high steppage in left foot, dysmetria on FTN b/l, b/l patellar hyperreflexia  11/3 AM: 5/5 dorsi/plantarflexion b/l on exam, unstable gait with high steppage in left foot, dysmetria on FTN b/l, b/l patellar hyperreflexia  11/2 AM: 5/5 dorsi/plantarflexion b/l on exam, dysmetria on FTN b/l, gait exam deferred by pt  11/1 AM: 5/5 dorsi/plantarflexion b/l on exam, unstable gait with high steppage in left foot, dysmetria on FTN b/l, b/l patellar hyperreflexia  10/31 AM: 5/5 dorsi/plantarflexion b/l on exam, unstable gait with high steppage in left foot, dysmetria on FTN b/l, b/l patellar hyperreflexia  10/30 AM: 5/5 dorsi/plantarflexion b/l on exam,   - f/u MR Brain and chong-spine (will receive Ativan as premedication)  -neurology following: pt's presentation possibly due to peripheral nerve disease, spinal cord compression/radiculopathy, transverse myelitis, peroneal nerve injury (would affect dorsiflexion and foot eversion), autoimmune diseases like ALS, Muscular dystrophy, etc   -workup per neurology negative thus far (negative syphilis, lyme, diabetes, b12/folate)  - Plan for MRI with ativan premedication  -patient will need outpatient EMG testing   -fall precautions, ambulate with assistance  -PT/OT both recommending outpatient f/u. On admission, pt p/w left foot drop. 5/5 Str on exam but gait unstable with left foot high steppage    -ortho consulted for management recommendations, Plan to transfer pt to [facility] for operation on Tuesday 11/08 11/5 AM: 5/5 dorsi/plantarflexion b/l on exam, unstable gait with high steppage in left foot, dysmetria on FTN b/l, b/l patellar hyperreflexia  11/4 AM: 5/5 dorsi/plantarflexion b/l on exam, unstable gait with high steppage in left foot, dysmetria on FTN b/l, b/l patellar hyperreflexia  11/3 AM: 5/5 dorsi/plantarflexion b/l on exam, unstable gait with high steppage in left foot, dysmetria on FTN b/l, b/l patellar hyperreflexia  11/2 AM: 5/5 dorsi/plantarflexion b/l on exam, dysmetria on FTN b/l, gait exam deferred by pt  11/1 AM: 5/5 dorsi/plantarflexion b/l on exam, unstable gait with high steppage in left foot, dysmetria on FTN b/l, b/l patellar hyperreflexia  10/31 AM: 5/5 dorsi/plantarflexion b/l on exam, unstable gait with high steppage in left foot, dysmetria on FTN b/l, b/l patellar hyperreflexia  10/30 AM: 5/5 dorsi/plantarflexion b/l on exam,   - f/u MR Brain and chong-spine (will receive Ativan as premedication)  -neurology following: pt's presentation possibly due to peripheral nerve disease, spinal cord compression/radiculopathy, transverse myelitis, peroneal nerve injury (would affect dorsiflexion and foot eversion), autoimmune diseases like ALS, Muscular dystrophy, etc   -workup per neurology negative thus far (negative syphilis, lyme, diabetes, b12/folate)  - Plan for MRI with ativan premedication  -patient will need outpatient EMG testing   -fall precautions, ambulate with assistance  -PT/OT both recommending outpatient f/u.

## 2022-11-06 NOTE — PROGRESS NOTE ADULT - PROVIDER SPECIALTY LIST ADULT
Internal Medicine
Orthopedics
Orthopedics
Internal Medicine

## 2022-11-06 NOTE — H&P ADULT - ATTENDING COMMENTS
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 We will proceed with a C2-T2 posterior cervical decompression/fusion. I did discuss with the patient and the patient's family risks of the procedure. These risks did include pain, need for reoperation, injury to surrounding nerves/arteries/veins, paralysis, nerve root injury, csf leak, dural tear, nonunion, weakness, instrumentation failure, nonunion, infection, wound dehiscence, dvt/pe and other risks. The patient did agree to proceed with the operative treatment and proper informed consent was obtained. I did specifically go over the high risk nature of the surgery given the degree of spinal cord compression that is present. There is a risk of paralysis associated with this procedure and the patient understood this risk.

## 2022-11-06 NOTE — CONSULT NOTE ADULT - PROBLEM SELECTOR RECOMMENDATION 9
Patient scheduled for surgery 11/8  No contraindication to scheduled procedure  NPO the MN prior to surgery  DVT and GI prophylaxis

## 2022-11-06 NOTE — PROGRESS NOTE ADULT - ATTENDING COMMENTS
Patient seen and examined, care plan discussed with house staff as above.    57yoM w ongoing back pain after a fall in 2019 (slipped on ice at work) w ongoing LLE weakness and balance issues. CT spine showing C5-6 mod canal narrowing this admission. Exam notable for bilateral symmetric hyperreflexia of patellar and biceps, marked gait abnormality (L foot drop), abnormal cerebellar testing (finger to nose), and paresthesias of the upper extremities (decr sensation in distal C8 dermatome bilaterally on exam, did not do pinprick) No cogwheel rigidity of upper extremities. Of note, pt reports a remote history of ETOH use, quit drinking about 15 years ago, she says he would drink about 1-2 beverages a night, no binging.    PT/OT consulted, will need home walker.     Neurology consulted, diagnosed cervical spondylitic myopathy on admission, MRI full spine and brain obtained on 11/2, f/u w Neuro regarding findings [ ]. Pt had a fall this am, feels dizzy, likely iatrogenic from getting a total of 3 mg oral ativan and a dose of 5 mg of flexeril last night prior to his MRI.  D/c flexeril for now. Superficial abrasions to his patella bilaterally, but full ROM and no hematoma, no need for further skeletal imaging rn. Working on obtaining his prior MRI imaging and results from 2019 [ ].  OP EMG testing rec'd by Neuro as well.
Patient seen and examined, care plan discussed with house staff as above.    57yoM w ongoing back pain after a fall in 2019 (slipped on ice at work) w ongoing LLE weakness and balance issues. CT spine showing C5-6 mod canal narrowing this admission. Exam notable for bilateral symmetric hyperreflexia of patellar and biceps, marked gait abnormality (L foot drop), abnormal cerebellar testing (finger to nose), and paresthesias of the upper extremities (decr sensation in distal C8 dermatome bilaterally on exam, did not do pinprick) No cogwheel rigidity of upper extremities. Of note, pt reports a remote history of ETOH use, quit drinking about 15 years ago, she says he would drink about 1-2 beverages a night, no binging.    PT/OT consulted, will need home walker. Neurology consulted, diagnosed cervical spondylitic myopathy, pt amenable to MRI (with ativan for claustrophobia) full spine wwo and MRI brain [ ]. Can trial baclofen in the interim for pain. Pt not asking for his prn flexeril, hasn't gotten a dose yet. Working on obtaining his prior MRI imaging and results from 2019 [ ].  OP EMG testing rec'd by Neuro as well.
57 year old male with chronic neck/back pain from injury 2019, presented with new LUE/LLE numbness and weakness, concerning for cervical spondylotic myelopathy iso severe c-spine stenosis. Appreciate Neuro, Ortho input. Plan for surgical treatment with Ortho, planned for early next week. C/w pain control. Follow up additional Ortho recs.
Patient seen and examined, care plan discussed with house staff as above.    57yoM w ongoing back pain after a fall in 2018 (slipped on ice at work) w ongoing LLE weakness and balance issues. CT spine showing C5-6 mod canal narrowing. Exam notable for bilateral symmetric hyperreflexia of patellar and biceps, marked gait abnormality (L foot drop), abnormal cerebellar testing (finger to nose), and paresthesias of the upper extremities (decr sensation in distal C8 dermatome bilaterally on exam, did not do pinprick) No cogwheel rigidity of upper extremities. Of note, pt reports a remote history of ETOH use, quit drinking about 15 years ago, will obtain collateral from his wife. [ ]    PT/OT consulted, will need home walker. Neurology consulted, diagnosed cervical spondylitic myopathy, pt amenable to MRI (with ativan for claustrophobia) full spine wwo [ ]. Can trial baclofen in the interim for pain. Pt not asking for his prn flexeril.  OP EMG testing rec'd by Neuro as well.
57 year old male with chronic neck/back pain from injury 2019, presented with new LUE/LLE numbness and weakness, concerning for cervical spondylotic myelopathy iso severe c-spine stenosis. No acute changes overnight, sitting up in chair this AM, still with ongoing neck pain. Appreciate Neuro, Ortho input. Plan for surgical treatment with Ortho, awaiting transfer to [facility] for tentative OR on Tuesday.
Patient seen and examined, care plan discussed with house staff as above.    57yoM w back pain after a fall in 2018 (slipped on ice at work) w ongoing LLE weakness and balance issues. CT spine showing C5-6 mod canal narrowing. PT consult. Neurology consulted, diagnosed cervical spondylitic myopathy, pt amenable to MRI (with ativan for claustrophobia) cervical spine wwo [ ]. Can trial baclofen in the interim for pain. Pt not asking for his prn flexeril.  OP EMG testing rec'd by Neuro as well. Pt not forthcoming about his symptoms, will need collateral from his wife.
Patient seen and examined, care plan discussed with house staff as above.    57yoM w ongoing back pain after a fall in 2019 (slipped on ice at work) w ongoing LLE weakness and balance issues. CT spine showing C5-6 mod canal narrowing this admission. Exam notable for bilateral symmetric hyperreflexia of patellar and biceps, marked gait abnormality (L foot drop), abnormal cerebellar testing (finger to nose), and paresthesias of the upper extremities (decr sensation in distal C8 dermatome bilaterally on exam, did not do pinprick) No cogwheel rigidity of upper extremities. No significant etoh abuse.    PT/OT consulted, will need home walker.     Neurology consulted, MRI full spine and brain obtained on 11/2, f/u w Neuro --> rec's NSG consult. Ortho spine called 11/3.     Spoke w Dr. Casillas on 11/4, wants additional imaging as above, will plan for OR possibly next week.
Patient seen and examined, care plan discussed with house staff as above.    57yoM w ongoing back pain after a fall in 2019 (slipped on ice at work) w ongoing LLE weakness and balance issues. CT spine showing C5-6 mod canal narrowing this admission. Exam notable for bilateral symmetric hyperreflexia of patellar and biceps, marked gait abnormality (L foot drop), abnormal cerebellar testing (finger to nose), and paresthesias of the upper extremities (decr sensation in distal C8 dermatome bilaterally on exam, did not do pinprick) No cogwheel rigidity of upper extremities. No significant etoh abuse.    PT/OT consulted, will need home walker.     Neurology consulted, MRI full spine and brain obtained on 11/2, f/u w Neuro --> rec's NSG consult. Ortho spine called 11/3 [ ].      Working on obtaining his prior MRI imaging and results from 2019 [ ].  OP EMG testing rec'd by Neuro as well.    Updated patient on 11/3, told him we were going to discuss his case with the surgeons. He states "I really want surgery." No further falls overnight on 11/2.

## 2022-11-06 NOTE — PROGRESS NOTE ADULT - PROBLEM SELECTOR PLAN 5
-DVT ppx with lovenox   -regular diet   -Dispo: pending further workup

## 2022-11-06 NOTE — PROGRESS NOTE ADULT - SUBJECTIVE AND OBJECTIVE BOX
ORTHOPAEDICS DAILY PROGRESS NOTE:       SUBJECTIVE/ROS:  Seen and examined. Pain well controlled. Has been working with Pt. Denies CP/SOB/N/V.         MEDICATIONS  (STANDING):  enoxaparin Injectable 40 milliGRAM(s) SubCutaneous every 24 hours    MEDICATIONS  (PRN):  baclofen 5 milliGRAM(s) Oral every 8 hours PRN Musculoskeletal Pain      OBJECTIVE:    Vital Signs Last 24 Hrs  T(C): 36.6 (05 Nov 2022 20:58), Max: 36.6 (05 Nov 2022 05:12)  T(F): 97.9 (05 Nov 2022 20:58), Max: 97.9 (05 Nov 2022 05:12)  HR: 74 (05 Nov 2022 20:58) (74 - 99)  BP: 118/74 (05 Nov 2022 20:58) (109/73 - 118/74)  BP(mean): --  RR: 18 (05 Nov 2022 20:58) (16 - 18)  SpO2: 100% (05 Nov 2022 20:58) (100% - 100%)    Parameters below as of 05 Nov 2022 20:58  Patient On (Oxygen Delivery Method): room air        I&O's Detail      Daily     Daily     LABS:                        14.1   7.49  )-----------( Clumped    ( 05 Nov 2022 06:29 )             42.0     11-05    134<L>  |  104  |  14  ----------------------------<  80  TNP   |  21<L>  |  0.68    Ca    8.3<L>      05 Nov 2022 06:29  Phos  TNP     11-05  Mg     1.90     11-05    TPro  TNP  /  Alb  3.6  /  TBili  0.5  /  DBili  x   /  AST  TNP  /  ALT  TNP  /  AlkPhos  44  11-05                  PHYSICAL EXAM:  Gen: awake, alert, NAD  Resp: no increased work of breathing  Motor:                   C5                C6              C7               C8           T1   R            3+/5           3+/5            3+/5             3+/5          4/5  L             5/5               5/5             5/5             4/5          4/5                L2             L3             L4               L5            S1  R         5/5           5/5          5/5             5/5           5/5  L          5/5          5/5           5/5             5/5           5/5    Sensory:            C5         C6         C7      C8       T1        (0=absent, 1=impaired, 2=normal, NT=not testable)  R         2            2           2        2         2  L          2            2           2        2         2               L2          L3         L4      L5       S1         (0=absent, 1=impaired, 2=normal, NT=not testable)  R         2            2            2        2        2  L          2            2           2        2         2

## 2022-11-06 NOTE — PROGRESS NOTE ADULT - PROBLEM SELECTOR PLAN 3
On admission, paresthesias of left hand and foot    -11/05 AM: pt reports continued paresthesias of left hand and foot  -11/04 AM: pt reports continued paresthesias of left hand and foot  -11/03 AM: pt reports continued paresthesias of left hand and foot  -11/02 AM: pt reports continued paresthesias of left hand and foot  -11/01 AM: pt reports continued paresthesias of left hand and foot  -10/31 AM: pt reports continued paresthesias of left hand and foot  -10/30 AM: pt reports continued paresthesias of left hand and foot  -see above problem for neuro ddx + workup On admission, paresthesias of left hand and foot    -11/06 AM: pt reports continued paresthesias of left hand and foot  -11/05 AM: pt reports continued paresthesias of left hand and foot  -11/04 AM: pt reports continued paresthesias of left hand and foot  -11/03 AM: pt reports continued paresthesias of left hand and foot  -11/02 AM: pt reports continued paresthesias of left hand and foot  -11/01 AM: pt reports continued paresthesias of left hand and foot  -10/31 AM: pt reports continued paresthesias of left hand and foot  -10/30 AM: pt reports continued paresthesias of left hand and foot  -see above problem for neuro ddx + workup

## 2022-11-06 NOTE — PROGRESS NOTE ADULT - ASSESSMENT
A/P: 57y Male with chronic cervical myelopathy. No acute changes or emergent findings. Will d/w attending further management.  - Pain control  - WBAT with assistive devices as needed  - FU Labs  - SCDs

## 2022-11-06 NOTE — PROGRESS NOTE ADULT - SUBJECTIVE AND OBJECTIVE BOX
Interval hx:    MEDICATIONS  (STANDING):  enoxaparin Injectable 40 milliGRAM(s) SubCutaneous every 24 hours    MEDICATIONS  (PRN):  baclofen 5 milliGRAM(s) Oral every 8 hours PRN Musculoskeletal Pain    Vital Signs (24 Hrs):  T(C): 36.6 (11-06-22 @ 05:29), Max: 36.6 (11-05-22 @ 20:58)  HR: 81 (11-06-22 @ 05:29) (74 - 99)  BP: 100/62 (11-06-22 @ 05:29) (100/62 - 118/74)  RR: 18 (11-06-22 @ 05:29) (16 - 18)  SpO2: 100% (11-06-22 @ 05:29) (100% - 100%)  Wt(kg): --  Daily     Daily     I&O's Summary    Physical Exam: Interval hx: NAEON    MEDICATIONS  (STANDING):  enoxaparin Injectable 40 milliGRAM(s) SubCutaneous every 24 hours    MEDICATIONS  (PRN):  baclofen 5 milliGRAM(s) Oral every 8 hours PRN Musculoskeletal Pain    Vital Signs (24 Hrs):  T(C): 36.6 (11-06-22 @ 05:29), Max: 36.6 (11-05-22 @ 20:58)  HR: 81 (11-06-22 @ 05:29) (74 - 99)  BP: 100/62 (11-06-22 @ 05:29) (100/62 - 118/74)  RR: 18 (11-06-22 @ 05:29) (16 - 18)  SpO2: 100% (11-06-22 @ 05:29) (100% - 100%)  Wt(kg): --  Daily     Daily     I&O's Summary    Physical Exam:  Constitutional: NAD, awake and alert  Respiratory: CTAB b/l  Cardiovascular: RRR, no m/r/g  Gastrointestinal: NTND, + bs, no organomegaly  Extremities: No LE edema b/l  Neurological: AAOX3, PERRLA, EOMI, 5/5 dorsi/plantarflexion b/l, decreased  strength and 4/5 str arm extension on R side (R side weakness is baseline deficit), normal  str and arm str on L side, Dysmetria on FTN b/l, gait unstable, pt b/l patellar hyperreflexia [unchanged from prior exam]  Musculoskeletal: CLAROS x 4  Skin: No cyanosis or pallor       LABS:  cret                        14.3   7.49  )-----------( 264      ( 06 Nov 2022 06:28 )             43.5     11-06    137  |  102  |  22  ----------------------------<  93  4.2   |  22  |  0.88    Ca    8.9      06 Nov 2022 06:28  Phos  3.7     11-06  Mg     2.00     11-06    TPro  6.2  /  Alb  3.7  /  TBili  0.3  /  DBili  x   /  AST  39  /  ALT  57<H>  /  AlkPhos  61  11-06

## 2022-11-06 NOTE — H&P ADULT - HISTORY OF PRESENT ILLNESS
57M transferred from [facility] for evaluation and treatment of cervical myelopathy. He reports three years of right upper extremity weakness and gait unsteadiness after a fall onto back.  He denies seeing spine surgeon in past. No acute changes in RUE weakness or gait unsteadiness. Also reports new onset numbness L sole of foot. Denies pain/injury elsewhere. Denies tingling/paresthesia. Denies bowel/bladder incontinence. No saddle anesthesia. Denies fevers/chills. No other complaints at this time.

## 2022-11-06 NOTE — PROGRESS NOTE ADULT - ASSESSMENT
57 year old male with hx of chronic back pain and right sided hand/wrist contracture after a work related injury presenting to the hospital for paresthesias of the left hand/fingers/foot with related left foot drop and difficulty ambulating, found to have cervical and lumbar degenerative disc disease on CT scan. Neuro recommending outpatient neuro f/u and EMG testing after discharge. PT/OT both recommending outpatient f/u after discharge. Because of MR panspine findings, Ortho was consulted - recommending anterior cervical corpectomy and fusion with a posterior cervical spinal fusion. Pt to decide if would like to remain inpatient for surgery vs outpatient f/u. CT Cervical Spine and flexion/extension Cervical spine XRs ordered for surgical planning. 57 year old male with hx of chronic back pain and right sided hand/wrist contracture after a work related injury presenting to the hospital for paresthesias of the left hand/fingers/foot with related left foot drop and difficulty ambulating, found to have cervical and lumbar degenerative disc disease on CT scan. Neuro recommending outpatient neuro f/u and EMG testing after discharge. PT/OT both recommending outpatient f/u after discharge. Because of MR panspine findings, Ortho was consulted - recommending anterior cervical corpectomy and fusion with a posterior cervical spinal fusion. Plan to transfer pt to [facility] for operation on Tuesday 11/08

## 2022-11-06 NOTE — H&P ADULT - NSHPPHYSICALEXAM_GEN_ALL_CORE
Exam:  General: Awake alert pleasant ambulating no acute distress  Spine:  Skin intact. No obvious abrasions or lacerations appreciated. Soft collar in place.   TTP over cervical and thoracic midline. No palpable step off or deformity appreciated. No paraspinal TTP.  2-3 beats of clonus bilaterally. Positive Babinski bilaterally  DP/Radial pulses palpable  Compartments soft and compressible  No calf TTP    Motor:                   C5                C6              C7               C8           T1   R            5/5                5/5            4/5             4/5          3+/5  L             5/5               5/5             5/5             5/5          4/5                L2             L3             L4               L5            S1  R         5/5           5/5          5/5             5/5           5/5  L          5/5          5/5           5/5             5/5           5/5    Sensory:            C5         C6         C7      C8       T1        (0=absent, 1=impaired, 2=normal, NT=not testable)  R         2            2           2        2         2  L          2            2           2        2         2               L2          L3         L4      L5       S1         (0=absent, 1=impaired, 2=normal, NT=not testable)  R         2            2            2        2        2  L          2            2           2        2         2 Exam:  General: Awake alert pleasant ambulating no acute distress  Spine:  Skin intact. No obvious abrasions or lacerations appreciated. Soft collar in place.   TTP over cervical and thoracic midline. No palpable step off or deformity appreciated. No paraspinal TTP.  2-3 beats of clonus bilaterally. Positive Babinski bilaterally, positive garcia's bilaterally  DP/Radial pulses palpable  Compartments soft and compressible  No calf TTP    Motor:                   C5                C6              C7               C8           T1   R            3/5                5/5            4/5             2/5          2/5  L             4/5               5/5             5/5             3/5          3/5                L2             L3             L4               L5            S1  R         5/5           5/5          5/5             5/5           5/5  L          5/5          5/5           5/5             5/5           5/5    Sensory:            C5         C6         C7      C8       T1        (0=absent, 1=impaired, 2=normal, NT=not testable)  R         2            2           2        2         2  L          2            2           2        2         2               L2          L3         L4      L5       S1         (0=absent, 1=impaired, 2=normal, NT=not testable)  R         2            2            2        2        2  L          2            2           2        2         2

## 2022-11-06 NOTE — H&P ADULT - ASSESSMENT
57M with cervical spine myelopathy    Plan:  Medical comanagment appreciated - please document clearance/optimization for OR  Outside imaging from [facility] reviewed  Preop labs/T&S/COVID/EKG ordered  Patient okay to shower so long as he has support  Plan for OR with Dr. Casillas for C2-T2 PSF on Tuesday 11/8  Discussed with attending dr. Casillas who agrees with plan

## 2022-11-06 NOTE — PROGRESS NOTE ADULT - PROBLEM SELECTOR PLAN 1
Pt with prior work accident (2019) resulting in chronic back pain and baseline R hand contracture + weakness. Pt with evidence of cervical spondylosis on MR this admission.    - ortho consulted for management - recommending anterior cervical corpectomy and fusion with a posterior cervical spinal fusion. Plan to take patient to OR possibly on Tuesday.  -CT Cervical Spine and flexion/extension Cervical spine XRs showing unstable C2-4 with compression deformity in C5 Pt with prior work accident (2019) resulting in chronic back pain and baseline R hand contracture + weakness. Pt with evidence of cervical spondylosis on MR this admission.    - ortho consulted for management - recommending anterior cervical corpectomy and fusion with a posterior cervical spinal fusion. Plan to transfer pt to [facility] for operation on Tuesday 11/08  -CT Cervical Spine and flexion/extension Cervical spine XRs showing unstable C2-4 with compression deformity in C5

## 2022-11-06 NOTE — CONSULT NOTE ADULT - PROBLEM SELECTOR RECOMMENDATION 3
found to have an elevate glucose on blood work  continue to follow glucose of BMP  if glucose remains elevated would start FS Qac and Qhs with an insulin sliding scale

## 2022-11-06 NOTE — CONSULT NOTE ADULT - TIME BILLING
Discussed treatment plan with patient at bedside.   I am a non participating [employer] physician seeing Pt in coverage for Dr Rubio Discussed treatment plan with patient at bedside.   I am a non participating [employer] physician seeing Pt in coverage for Dr Rubio. The above patient documentation by Dr. Hurtado was reviewed and I agree with his evaluation, assessment and treatment plan.  Dion Rubio M.D.

## 2022-11-07 ENCOUNTER — TRANSCRIPTION ENCOUNTER (OUTPATIENT)
Age: 57
End: 2022-11-07

## 2022-11-07 PROBLEM — M54.9 DORSALGIA, UNSPECIFIED: Chronic | Status: ACTIVE | Noted: 2022-10-30

## 2022-11-07 PROBLEM — Z87.39 PERSONAL HISTORY OF OTHER DISEASES OF THE MUSCULOSKELETAL SYSTEM AND CONNECTIVE TISSUE: Chronic | Status: ACTIVE | Noted: 2022-10-30

## 2022-11-07 LAB
ANION GAP SERPL CALC-SCNC: 9 MMOL/L — SIGNIFICANT CHANGE UP (ref 5–17)
APTT BLD: 27.3 SEC — LOW (ref 27.5–35.5)
BLD GP AB SCN SERPL QL: NEGATIVE — SIGNIFICANT CHANGE UP
BUN SERPL-MCNC: 20 MG/DL — SIGNIFICANT CHANGE UP (ref 7–23)
CALCIUM SERPL-MCNC: 9.3 MG/DL — SIGNIFICANT CHANGE UP (ref 8.4–10.5)
CHLORIDE SERPL-SCNC: 101 MMOL/L — SIGNIFICANT CHANGE UP (ref 96–108)
CO2 SERPL-SCNC: 28 MMOL/L — SIGNIFICANT CHANGE UP (ref 22–31)
CREAT SERPL-MCNC: 0.81 MG/DL — SIGNIFICANT CHANGE UP (ref 0.5–1.3)
EGFR: 103 ML/MIN/1.73M2 — SIGNIFICANT CHANGE UP
GLUCOSE SERPL-MCNC: 94 MG/DL — SIGNIFICANT CHANGE UP (ref 70–99)
HCT VFR BLD CALC: 43.4 % — SIGNIFICANT CHANGE UP (ref 39–50)
HCV AB S/CO SERPL IA: 0.11 S/CO — SIGNIFICANT CHANGE UP (ref 0–0.99)
HCV AB SERPL-IMP: SIGNIFICANT CHANGE UP
HGB BLD-MCNC: 14.6 G/DL — SIGNIFICANT CHANGE UP (ref 13–17)
INR BLD: 0.97 RATIO — SIGNIFICANT CHANGE UP (ref 0.88–1.16)
MCHC RBC-ENTMCNC: 28.5 PG — SIGNIFICANT CHANGE UP (ref 27–34)
MCHC RBC-ENTMCNC: 33.6 GM/DL — SIGNIFICANT CHANGE UP (ref 32–36)
MCV RBC AUTO: 84.8 FL — SIGNIFICANT CHANGE UP (ref 80–100)
NRBC # BLD: 0 /100 WBCS — SIGNIFICANT CHANGE UP (ref 0–0)
PLATELET # BLD AUTO: 252 K/UL — SIGNIFICANT CHANGE UP (ref 150–400)
POTASSIUM SERPL-MCNC: 4.3 MMOL/L — SIGNIFICANT CHANGE UP (ref 3.5–5.3)
POTASSIUM SERPL-SCNC: 4.3 MMOL/L — SIGNIFICANT CHANGE UP (ref 3.5–5.3)
PROTHROM AB SERPL-ACNC: 11.2 SEC — SIGNIFICANT CHANGE UP (ref 10.5–13.4)
RBC # BLD: 5.12 M/UL — SIGNIFICANT CHANGE UP (ref 4.2–5.8)
RBC # FLD: 14.6 % — HIGH (ref 10.3–14.5)
RH IG SCN BLD-IMP: POSITIVE — SIGNIFICANT CHANGE UP
SODIUM SERPL-SCNC: 138 MMOL/L — SIGNIFICANT CHANGE UP (ref 135–145)
VIT B1 SERPL-MCNC: 119.7 NMOL/L — SIGNIFICANT CHANGE UP (ref 66.5–200)
WBC # BLD: 7.58 K/UL — SIGNIFICANT CHANGE UP (ref 3.8–10.5)
WBC # FLD AUTO: 7.58 K/UL — SIGNIFICANT CHANGE UP (ref 3.8–10.5)

## 2022-11-07 RX ORDER — SODIUM CHLORIDE 9 MG/ML
1000 INJECTION, SOLUTION INTRAVENOUS
Refills: 0 | Status: DISCONTINUED | OUTPATIENT
Start: 2022-11-07 | End: 2022-11-08

## 2022-11-07 RX ADMIN — Medication 500 MILLIGRAM(S): at 06:05

## 2022-11-07 RX ADMIN — Medication 500 MILLIGRAM(S): at 18:09

## 2022-11-07 RX ADMIN — SODIUM CHLORIDE 75 MILLILITER(S): 9 INJECTION, SOLUTION INTRAVENOUS at 00:36

## 2022-11-07 RX ADMIN — Medication 1 TABLET(S): at 13:08

## 2022-11-07 RX ADMIN — HEPARIN SODIUM 5000 UNIT(S): 5000 INJECTION INTRAVENOUS; SUBCUTANEOUS at 14:00

## 2022-11-07 RX ADMIN — OXYCODONE HYDROCHLORIDE 10 MILLIGRAM(S): 5 TABLET ORAL at 13:07

## 2022-11-07 RX ADMIN — OXYCODONE HYDROCHLORIDE 10 MILLIGRAM(S): 5 TABLET ORAL at 18:10

## 2022-11-07 RX ADMIN — OXYCODONE HYDROCHLORIDE 10 MILLIGRAM(S): 5 TABLET ORAL at 13:52

## 2022-11-07 RX ADMIN — HEPARIN SODIUM 5000 UNIT(S): 5000 INJECTION INTRAVENOUS; SUBCUTANEOUS at 06:04

## 2022-11-07 RX ADMIN — HEPARIN SODIUM 5000 UNIT(S): 5000 INJECTION INTRAVENOUS; SUBCUTANEOUS at 22:06

## 2022-11-07 RX ADMIN — Medication 1 MILLIGRAM(S): at 13:07

## 2022-11-07 RX ADMIN — OXYCODONE HYDROCHLORIDE 10 MILLIGRAM(S): 5 TABLET ORAL at 09:07

## 2022-11-07 RX ADMIN — OXYCODONE HYDROCHLORIDE 10 MILLIGRAM(S): 5 TABLET ORAL at 09:50

## 2022-11-07 RX ADMIN — OXYCODONE HYDROCHLORIDE 10 MILLIGRAM(S): 5 TABLET ORAL at 18:34

## 2022-11-07 RX ADMIN — PANTOPRAZOLE SODIUM 40 MILLIGRAM(S): 20 TABLET, DELAYED RELEASE ORAL at 06:05

## 2022-11-07 NOTE — PROGRESS NOTE ADULT - SUBJECTIVE AND OBJECTIVE BOX
Orthopaedic Surgery Progress Note    Subjective:   Patient seen and examined. No acute events overnight.     Objective:  T(C): 36.4 (11-07-22 @ 05:39), Max: 37.1 (11-06-22 @ 20:36)  HR: 75 (11-07-22 @ 05:39) (75 - 88)  BP: 109/69 (11-07-22 @ 05:39) (105/65 - 112/74)  RR: 18 (11-07-22 @ 05:39) (18 - 18)  SpO2: 99% (11-07-22 @ 05:39) (99% - 100%)  Wt(kg): --    11-06 @ 07:01  -  11-07 @ 06:06  --------------------------------------------------------  IN: 420 mL / OUT: 500 mL / NET: -80 mL      PE  General: Awake alert pleasant ambulating no acute distress  Spine:  Skin intact. No obvious abrasions or lacerations appreciated. Soft collar in place.   TTP over cervical and thoracic midline. No palpable step off or deformity appreciated. No paraspinal TTP.  2-3 beats of clonus bilaterally. Positive Babinski bilaterally  Positive Shelton's bilaterally  DP/Radial pulses palpable  Compartments soft and compressible  No calf TTP    Motor:                   C5                C6              C7               C8           T1   R            5/5                5/5            4/5             4/5          3+/5  L             5/5               5/5             5/5             5/5          4/5                L2             L3             L4               L5            S1  R         5/5           5/5          5/5             5/5           5/5  L          5/5          5/5           5/5             5/5           5/5    Sensory:            C5         C6         C7      C8       T1        (0=absent, 1=impaired, 2=normal, NT=not testable)  R         2            2           2        2         2  L          2            2           2        2         2               L2          L3         L4      L5       S1         (0=absent, 1=impaired, 2=normal, NT=not testable)  R         2            2            2        2        2  L          2            2           2        2         2                        14.3   7.19  )-----------( 256      ( 06 Nov 2022 18:06 )             43.6     11-06    139  |  100  |  22  ----------------------------<  146<H>  3.8   |  28  |  0.89    Ca    9.2      06 Nov 2022 18:06      PT/INR - ( 06 Nov 2022 18:06 )   PT: 11.9 sec;   INR: 1.03 ratio         PTT - ( 06 Nov 2022 18:06 )  PTT:28.5 sec        57M with cervical spine myelopathy  Plan:  Medical optimization   Outside imaging from [facility] reviewed  Preop labs/T&S/COVID/EKG ordered  Patient okay to shower so long as he has support  OR Tuesday 11/8 with Dr. Casillas Orthopaedic Surgery Progress Note    Subjective:   Patient seen and was examined. No acute events overnight.     Objective:  T(C): 36.4 (11-07-22 @ 05:39), Max: 37.1 (11-06-22 @ 20:36)  HR: 75 (11-07-22 @ 05:39) (75 - 88)  BP: 109/69 (11-07-22 @ 05:39) (105/65 - 112/74)  RR: 18 (11-07-22 @ 05:39) (18 - 18)  SpO2: 99% (11-07-22 @ 05:39) (99% - 100%)  Wt(kg): --    11-06 @ 07:01  -  11-07 @ 06:06  --------------------------------------------------------  IN: 420 mL / OUT: 500 mL / NET: -80 mL      PE  General: Awake alert pleasant ambulating no acute distress  Spine:  Skin intact. No obvious abrasions or lacerations appreciated. Soft collar in place.   TTP over cervical and thoracic midline. No palpable step off or deformity appreciated. No paraspinal TTP.  2-3 beats of clonus bilaterally. Positive Babinski bilaterally  Positive Shelton's bilaterally  DP/Radial pulses palpable  Compartments soft and compressible  No calf TTP    Motor:                   C5                C6              C7               C8           T1   R            5/5                5/5            4/5             4/5          3+/5  L             5/5               5/5             5/5             5/5          4/5                L2             L3             L4               L5            S1  R         5/5           5/5          5/5             5/5           5/5  L          5/5          5/5           5/5             5/5           5/5    Sensory:            C5         C6         C7      C8       T1        (0=absent, 1=impaired, 2=normal, NT=not testable)  R         2            2           2        2         2  L          2            2           2        2         2               L2          L3         L4      L5       S1         (0=absent, 1=impaired, 2=normal, NT=not testable)  R         2            2            2        2        2  L          2            2           2        2         2                        14.3   7.19  )-----------( 256      ( 06 Nov 2022 18:06 )             43.6     11-06    139  |  100  |  22  ----------------------------<  146<H>  3.8   |  28  |  0.89    Ca    9.2      06 Nov 2022 18:06      PT/INR - ( 06 Nov 2022 18:06 )   PT: 11.9 sec;   INR: 1.03 ratio         PTT - ( 06 Nov 2022 18:06 )  PTT:28.5 sec        57M with cervical spine myelopathy  Plan:  Medical optimization   Outside imaging from [facility] reviewed  Preop labs/T&S/COVID/EKG ordered  Patient okay to shower so long as he has support  OR Tuesday 11/8 with Dr. Casillas

## 2022-11-07 NOTE — PROGRESS NOTE ADULT - SUBJECTIVE AND OBJECTIVE BOX
57M transferred from [facility] for evaluation and treatment of cervical myelopathy. He reports three years of right upper extremity weakness and gait unsteadiness after a fall onto back.  He denies seeing spine surgeon in past. No acute changes in RUE weakness or gait unsteadiness. Also reports new onset numbness L sole of foot. Denies pain/injury elsewhere. Denies tingling/paresthesia. Denies bowel/bladder incontinence. No saddle anesthesia. Denies fevers/chills. No other complaints at this time. Patient is scheduled for a PSF of C2-T2. Patient seen now resting comfortably. Patient scheduled for surgery 11/8      MEDICATIONS  (STANDING):  ascorbic acid 500 milliGRAM(s) Oral two times a day  folic acid 1 milliGRAM(s) Oral daily  heparin   Injectable 5000 Unit(s) SubCutaneous every 8 hours  lactated ringers. 1000 milliLiter(s) (75 mL/Hr) IV Continuous <Continuous>  multivitamin 1 Tablet(s) Oral daily  pantoprazole    Tablet 40 milliGRAM(s) Oral before breakfast    MEDICATIONS  (PRN):  acetaminophen     Tablet .. 650 milliGRAM(s) Oral every 6 hours PRN Temp greater or equal to 38C (100.4F), Mild Pain (1 - 3)  ondansetron Injectable 4 milliGRAM(s) IV Push every 6 hours PRN Nausea and/or Vomiting  oxyCODONE    IR 10 milliGRAM(s) Oral every 4 hours PRN Severe Pain (7 - 10)  oxyCODONE    IR 5 milliGRAM(s) Oral every 4 hours PRN Moderate Pain (4 - 6)          VITALS:   T(C): 36.7 (11-07-22 @ 14:00), Max: 37.1 (11-06-22 @ 20:36)  HR: 89 (11-07-22 @ 14:00) (75 - 89)  BP: 101/65 (11-07-22 @ 14:00) (101/65 - 117/75)  RR: 18 (11-07-22 @ 14:00) (18 - 18)  SpO2: 100% (11-07-22 @ 14:00) (99% - 100%)  Wt(kg): --    PHYSICAL EXAM:  GENERAL: NAD, well-groomed, well-developed  HEAD:  Atraumatic, Normocephalic  EYES: EOMI, PERRLA, conjunctiva and sclera clear  ENMT: No tonsillar erythema, exudates, or enlargement; Moist mucous membranes, Good dentition, No lesions  NECK: Supple, No JVD, Normal thyroid  NERVOUS SYSTEM:  Alert & Oriented X3, right sided weakness   CHEST/LUNG: Clear to percussion bilaterally; No rales, rhonchi, wheezing, or rubs  HEART: Regular rate and rhythm; No murmurs, rubs, or gallops  ABDOMEN: Soft, Nontender, Nondistended; Bowel sounds present  EXTREMITIES:  2+ Peripheral Pulses, No clubbing, cyanosis, or edema  LYMPH: No lymphadenopathy noted  SKIN: No rashes or lesions    LABS:        CBC Full  -  ( 07 Nov 2022 08:00 )  WBC Count : 7.58 K/uL  RBC Count : 5.12 M/uL  Hemoglobin : 14.6 g/dL  Hematocrit : 43.4 %  Platelet Count - Automated : 252 K/uL  Mean Cell Volume : 84.8 fl  Mean Cell Hemoglobin : 28.5 pg  Mean Cell Hemoglobin Concentration : 33.6 gm/dL  Auto Neutrophil # : x  Auto Lymphocyte # : x  Auto Monocyte # : x  Auto Eosinophil # : x  Auto Basophil # : x  Auto Neutrophil % : x  Auto Lymphocyte % : x  Auto Monocyte % : x  Auto Eosinophil % : x  Auto Basophil % : x    11-07    138  |  101  |  20  ----------------------------<  94  4.3   |  28  |  0.81    Ca    9.3      07 Nov 2022 08:00        PT/INR - ( 07 Nov 2022 08:00 )   PT: 11.2 sec;   INR: 0.97 ratio         PTT - ( 07 Nov 2022 08:00 )  PTT:27.3 sec    CAPILLARY BLOOD GLUCOSE          RADIOLOGY & ADDITIONAL TESTS:

## 2022-11-08 ENCOUNTER — APPOINTMENT (OUTPATIENT)
Dept: ORTHOPEDIC SURGERY | Facility: HOSPITAL | Age: 57
End: 2022-11-08

## 2022-11-08 LAB
ANION GAP SERPL CALC-SCNC: 13 MMOL/L — SIGNIFICANT CHANGE UP (ref 5–17)
ANION GAP SERPL CALC-SCNC: 8 MMOL/L — SIGNIFICANT CHANGE UP (ref 5–17)
APTT BLD: 28.4 SEC — SIGNIFICANT CHANGE UP (ref 27.5–35.5)
BUN SERPL-MCNC: 10 MG/DL — SIGNIFICANT CHANGE UP (ref 7–23)
BUN SERPL-MCNC: 14 MG/DL — SIGNIFICANT CHANGE UP (ref 7–23)
CALCIUM SERPL-MCNC: 7.7 MG/DL — LOW (ref 8.4–10.5)
CALCIUM SERPL-MCNC: 9.3 MG/DL — SIGNIFICANT CHANGE UP (ref 8.4–10.5)
CHLORIDE SERPL-SCNC: 104 MMOL/L — SIGNIFICANT CHANGE UP (ref 96–108)
CHLORIDE SERPL-SCNC: 110 MMOL/L — HIGH (ref 96–108)
CO2 SERPL-SCNC: 22 MMOL/L — SIGNIFICANT CHANGE UP (ref 22–31)
CO2 SERPL-SCNC: 28 MMOL/L — SIGNIFICANT CHANGE UP (ref 22–31)
CREAT SERPL-MCNC: 0.76 MG/DL — SIGNIFICANT CHANGE UP (ref 0.5–1.3)
CREAT SERPL-MCNC: 0.89 MG/DL — SIGNIFICANT CHANGE UP (ref 0.5–1.3)
EGFR: 100 ML/MIN/1.73M2 — SIGNIFICANT CHANGE UP
EGFR: 105 ML/MIN/1.73M2 — SIGNIFICANT CHANGE UP
GAS PNL BLDA: SIGNIFICANT CHANGE UP
GLUCOSE SERPL-MCNC: 102 MG/DL — HIGH (ref 70–99)
GLUCOSE SERPL-MCNC: 185 MG/DL — HIGH (ref 70–99)
HCT VFR BLD CALC: 34.7 % — LOW (ref 39–50)
HCT VFR BLD CALC: 41.9 % — SIGNIFICANT CHANGE UP (ref 39–50)
HGB BLD-MCNC: 11.7 G/DL — LOW (ref 13–17)
HGB BLD-MCNC: 13.8 G/DL — SIGNIFICANT CHANGE UP (ref 13–17)
INR BLD: 1.03 RATIO — SIGNIFICANT CHANGE UP (ref 0.88–1.16)
LACTATE SERPL-SCNC: 3.4 MMOL/L — HIGH (ref 0.5–2)
MCHC RBC-ENTMCNC: 28.6 PG — SIGNIFICANT CHANGE UP (ref 27–34)
MCHC RBC-ENTMCNC: 28.7 PG — SIGNIFICANT CHANGE UP (ref 27–34)
MCHC RBC-ENTMCNC: 32.9 GM/DL — SIGNIFICANT CHANGE UP (ref 32–36)
MCHC RBC-ENTMCNC: 33.7 GM/DL — SIGNIFICANT CHANGE UP (ref 32–36)
MCV RBC AUTO: 85 FL — SIGNIFICANT CHANGE UP (ref 80–100)
MCV RBC AUTO: 86.7 FL — SIGNIFICANT CHANGE UP (ref 80–100)
NRBC # BLD: 0 /100 WBCS — SIGNIFICANT CHANGE UP (ref 0–0)
NRBC # BLD: 0 /100 WBCS — SIGNIFICANT CHANGE UP (ref 0–0)
PLATELET # BLD AUTO: 241 K/UL — SIGNIFICANT CHANGE UP (ref 150–400)
PLATELET # BLD AUTO: 267 K/UL — SIGNIFICANT CHANGE UP (ref 150–400)
POTASSIUM SERPL-MCNC: 4 MMOL/L — SIGNIFICANT CHANGE UP (ref 3.5–5.3)
POTASSIUM SERPL-MCNC: 4.8 MMOL/L — SIGNIFICANT CHANGE UP (ref 3.5–5.3)
POTASSIUM SERPL-SCNC: 4 MMOL/L — SIGNIFICANT CHANGE UP (ref 3.5–5.3)
POTASSIUM SERPL-SCNC: 4.8 MMOL/L — SIGNIFICANT CHANGE UP (ref 3.5–5.3)
PROTHROM AB SERPL-ACNC: 12 SEC — SIGNIFICANT CHANGE UP (ref 10.5–13.4)
RBC # BLD: 4.08 M/UL — LOW (ref 4.2–5.8)
RBC # BLD: 4.83 M/UL — SIGNIFICANT CHANGE UP (ref 4.2–5.8)
RBC # FLD: 14.5 % — SIGNIFICANT CHANGE UP (ref 10.3–14.5)
RBC # FLD: 14.5 % — SIGNIFICANT CHANGE UP (ref 10.3–14.5)
SODIUM SERPL-SCNC: 140 MMOL/L — SIGNIFICANT CHANGE UP (ref 135–145)
SODIUM SERPL-SCNC: 145 MMOL/L — SIGNIFICANT CHANGE UP (ref 135–145)
WBC # BLD: 12.32 K/UL — HIGH (ref 3.8–10.5)
WBC # BLD: 7.53 K/UL — SIGNIFICANT CHANGE UP (ref 3.8–10.5)
WBC # FLD AUTO: 12.32 K/UL — HIGH (ref 3.8–10.5)
WBC # FLD AUTO: 7.53 K/UL — SIGNIFICANT CHANGE UP (ref 3.8–10.5)

## 2022-11-08 PROCEDURE — 63048 LAM FACETEC &FORAMOT EA ADDL: CPT

## 2022-11-08 PROCEDURE — 22595 ARTHRD PST TQ ATLAS-AXIS: CPT

## 2022-11-08 PROCEDURE — 72020 X-RAY EXAM OF SPINE 1 VIEW: CPT | Mod: 26

## 2022-11-08 PROCEDURE — 22614 ARTHRD PST TQ 1NTRSPC EA ADD: CPT

## 2022-11-08 PROCEDURE — 99222 1ST HOSP IP/OBS MODERATE 55: CPT | Mod: 57

## 2022-11-08 PROCEDURE — 99291 CRITICAL CARE FIRST HOUR: CPT

## 2022-11-08 PROCEDURE — 22843 INSERT SPINE FIXATION DEVICE: CPT

## 2022-11-08 PROCEDURE — 63045 LAM FACETEC & FORAMOT CRV: CPT

## 2022-11-08 PROCEDURE — 22600 ARTHRD PST TQ 1NTRSPC CRV: CPT

## 2022-11-08 DEVICE — IMPLANTABLE DEVICE: Type: IMPLANTABLE DEVICE | Site: POSTEROIR CERVICAL SPINE | Status: FUNCTIONAL

## 2022-11-08 DEVICE — SCREW POLYAXIAL 3.5X12MM: Type: IMPLANTABLE DEVICE | Site: POSTEROIR CERVICAL SPINE | Status: FUNCTIONAL

## 2022-11-08 DEVICE — KIT A-LINE 1LUM 20G X 12CM SAFE KIT: Type: IMPLANTABLE DEVICE | Site: POSTEROIR CERVICAL SPINE | Status: FUNCTIONAL

## 2022-11-08 DEVICE — SURGIFLO MATRIX WITH THROMBIN KIT: Type: IMPLANTABLE DEVICE | Site: POSTEROIR CERVICAL SPINE | Status: FUNCTIONAL

## 2022-11-08 DEVICE — SCREW POLYAXIAL 3.5X14MM: Type: IMPLANTABLE DEVICE | Site: POSTEROIR CERVICAL SPINE | Status: FUNCTIONAL

## 2022-11-08 DEVICE — SURGIFOAM PAD 8CM X 12.5CM X 10MM (100): Type: IMPLANTABLE DEVICE | Site: POSTEROIR CERVICAL SPINE | Status: FUNCTIONAL

## 2022-11-08 DEVICE — SCREW SET YUKON: Type: IMPLANTABLE DEVICE | Site: POSTEROIR CERVICAL SPINE | Status: FUNCTIONAL

## 2022-11-08 RX ORDER — ACETAMINOPHEN 500 MG
975 TABLET ORAL EVERY 8 HOURS
Refills: 0 | Status: DISCONTINUED | OUTPATIENT
Start: 2022-11-08 | End: 2022-11-19

## 2022-11-08 RX ORDER — OXYCODONE HYDROCHLORIDE 5 MG/1
5 TABLET ORAL EVERY 4 HOURS
Refills: 0 | Status: DISCONTINUED | OUTPATIENT
Start: 2022-11-08 | End: 2022-11-09

## 2022-11-08 RX ORDER — OXYCODONE HYDROCHLORIDE 5 MG/1
10 TABLET ORAL EVERY 4 HOURS
Refills: 0 | Status: DISCONTINUED | OUTPATIENT
Start: 2022-11-08 | End: 2022-11-09

## 2022-11-08 RX ORDER — HYDROMORPHONE HYDROCHLORIDE 2 MG/ML
0.5 INJECTION INTRAMUSCULAR; INTRAVENOUS; SUBCUTANEOUS
Refills: 0 | Status: DISCONTINUED | OUTPATIENT
Start: 2022-11-08 | End: 2022-11-10

## 2022-11-08 RX ORDER — PHENYLEPHRINE HYDROCHLORIDE 10 MG/ML
0.5 INJECTION INTRAVENOUS
Qty: 40 | Refills: 0 | Status: DISCONTINUED | OUTPATIENT
Start: 2022-11-08 | End: 2022-11-09

## 2022-11-08 RX ORDER — SENNA PLUS 8.6 MG/1
2 TABLET ORAL AT BEDTIME
Refills: 0 | Status: DISCONTINUED | OUTPATIENT
Start: 2022-11-08 | End: 2022-11-19

## 2022-11-08 RX ORDER — ALBUMIN HUMAN 25 %
100 VIAL (ML) INTRAVENOUS ONCE
Refills: 0 | Status: COMPLETED | OUTPATIENT
Start: 2022-11-08 | End: 2022-11-08

## 2022-11-08 RX ORDER — TRAMADOL HYDROCHLORIDE 50 MG/1
50 TABLET ORAL EVERY 6 HOURS
Refills: 0 | Status: DISCONTINUED | OUTPATIENT
Start: 2022-11-08 | End: 2022-11-09

## 2022-11-08 RX ORDER — CEFAZOLIN SODIUM 1 G
2000 VIAL (EA) INJECTION EVERY 8 HOURS
Refills: 0 | Status: COMPLETED | OUTPATIENT
Start: 2022-11-08 | End: 2022-11-09

## 2022-11-08 RX ORDER — BENZOCAINE AND MENTHOL 5; 1 G/100ML; G/100ML
1 LIQUID ORAL
Refills: 0 | Status: DISCONTINUED | OUTPATIENT
Start: 2022-11-08 | End: 2022-11-19

## 2022-11-08 RX ORDER — MAGNESIUM HYDROXIDE 400 MG/1
30 TABLET, CHEWABLE ORAL EVERY 12 HOURS
Refills: 0 | Status: DISCONTINUED | OUTPATIENT
Start: 2022-11-08 | End: 2022-11-19

## 2022-11-08 RX ORDER — DIPHENHYDRAMINE HCL 50 MG
25 CAPSULE ORAL EVERY 4 HOURS
Refills: 0 | Status: DISCONTINUED | OUTPATIENT
Start: 2022-11-08 | End: 2022-11-19

## 2022-11-08 RX ORDER — NALOXONE HYDROCHLORIDE 4 MG/.1ML
0.1 SPRAY NASAL
Refills: 0 | Status: DISCONTINUED | OUTPATIENT
Start: 2022-11-08 | End: 2022-11-19

## 2022-11-08 RX ORDER — HYDROMORPHONE HYDROCHLORIDE 2 MG/ML
0.5 INJECTION INTRAMUSCULAR; INTRAVENOUS; SUBCUTANEOUS
Refills: 0 | Status: DISCONTINUED | OUTPATIENT
Start: 2022-11-08 | End: 2022-11-08

## 2022-11-08 RX ORDER — DEXAMETHASONE 0.5 MG/5ML
3 ELIXIR ORAL EVERY 6 HOURS
Refills: 0 | Status: COMPLETED | OUTPATIENT
Start: 2022-11-10 | End: 2022-11-11

## 2022-11-08 RX ORDER — DEXAMETHASONE 0.5 MG/5ML
4 ELIXIR ORAL EVERY 6 HOURS
Refills: 0 | Status: COMPLETED | OUTPATIENT
Start: 2022-11-09 | End: 2022-11-10

## 2022-11-08 RX ORDER — DEXAMETHASONE 0.5 MG/5ML
1 ELIXIR ORAL EVERY 6 HOURS
Refills: 0 | Status: COMPLETED | OUTPATIENT
Start: 2022-11-12 | End: 2022-11-13

## 2022-11-08 RX ORDER — HYDROMORPHONE HYDROCHLORIDE 2 MG/ML
1 INJECTION INTRAMUSCULAR; INTRAVENOUS; SUBCUTANEOUS
Refills: 0 | Status: DISCONTINUED | OUTPATIENT
Start: 2022-11-08 | End: 2022-11-08

## 2022-11-08 RX ORDER — CYCLOBENZAPRINE HYDROCHLORIDE 10 MG/1
10 TABLET, FILM COATED ORAL EVERY 8 HOURS
Refills: 0 | Status: DISCONTINUED | OUTPATIENT
Start: 2022-11-08 | End: 2022-11-19

## 2022-11-08 RX ORDER — POLYETHYLENE GLYCOL 3350 17 G/17G
17 POWDER, FOR SOLUTION ORAL DAILY
Refills: 0 | Status: DISCONTINUED | OUTPATIENT
Start: 2022-11-08 | End: 2022-11-10

## 2022-11-08 RX ORDER — CALCIUM GLUCONATE 100 MG/ML
1 VIAL (ML) INTRAVENOUS ONCE
Refills: 0 | Status: COMPLETED | OUTPATIENT
Start: 2022-11-08 | End: 2022-11-08

## 2022-11-08 RX ORDER — DEXAMETHASONE 0.5 MG/5ML
2 ELIXIR ORAL EVERY 6 HOURS
Refills: 0 | Status: COMPLETED | OUTPATIENT
Start: 2022-11-11 | End: 2022-11-12

## 2022-11-08 RX ORDER — HYDROMORPHONE HYDROCHLORIDE 2 MG/ML
30 INJECTION INTRAMUSCULAR; INTRAVENOUS; SUBCUTANEOUS
Refills: 0 | Status: DISCONTINUED | OUTPATIENT
Start: 2022-11-08 | End: 2022-11-10

## 2022-11-08 RX ORDER — SODIUM CHLORIDE 9 MG/ML
1000 INJECTION, SOLUTION INTRAVENOUS
Refills: 0 | Status: DISCONTINUED | OUTPATIENT
Start: 2022-11-08 | End: 2022-11-09

## 2022-11-08 RX ORDER — GABAPENTIN 400 MG/1
100 CAPSULE ORAL THREE TIMES A DAY
Refills: 0 | Status: DISCONTINUED | OUTPATIENT
Start: 2022-11-08 | End: 2022-11-19

## 2022-11-08 RX ORDER — ONDANSETRON 8 MG/1
4 TABLET, FILM COATED ORAL ONCE
Refills: 0 | Status: DISCONTINUED | OUTPATIENT
Start: 2022-11-08 | End: 2022-11-09

## 2022-11-08 RX ORDER — ONDANSETRON 8 MG/1
4 TABLET, FILM COATED ORAL EVERY 6 HOURS
Refills: 0 | Status: DISCONTINUED | OUTPATIENT
Start: 2022-11-08 | End: 2022-11-19

## 2022-11-08 RX ORDER — PANTOPRAZOLE SODIUM 20 MG/1
40 TABLET, DELAYED RELEASE ORAL
Refills: 0 | Status: DISCONTINUED | OUTPATIENT
Start: 2022-11-08 | End: 2022-11-19

## 2022-11-08 RX ORDER — DEXAMETHASONE 0.5 MG/5ML
8 ELIXIR ORAL EVERY 8 HOURS
Refills: 0 | Status: COMPLETED | OUTPATIENT
Start: 2022-11-08 | End: 2022-11-09

## 2022-11-08 RX ORDER — SODIUM CHLORIDE 9 MG/ML
500 INJECTION, SOLUTION INTRAVENOUS ONCE
Refills: 0 | Status: COMPLETED | OUTPATIENT
Start: 2022-11-08 | End: 2022-11-08

## 2022-11-08 RX ADMIN — Medication 50 MILLILITER(S): at 18:22

## 2022-11-08 RX ADMIN — GABAPENTIN 100 MILLIGRAM(S): 400 CAPSULE ORAL at 21:19

## 2022-11-08 RX ADMIN — Medication 975 MILLIGRAM(S): at 22:00

## 2022-11-08 RX ADMIN — PANTOPRAZOLE SODIUM 40 MILLIGRAM(S): 20 TABLET, DELAYED RELEASE ORAL at 05:49

## 2022-11-08 RX ADMIN — Medication 975 MILLIGRAM(S): at 21:30

## 2022-11-08 RX ADMIN — HYDROMORPHONE HYDROCHLORIDE 30 MILLILITER(S): 2 INJECTION INTRAMUSCULAR; INTRAVENOUS; SUBCUTANEOUS at 17:35

## 2022-11-08 RX ADMIN — Medication 100 MILLIGRAM(S): at 21:19

## 2022-11-08 RX ADMIN — CYCLOBENZAPRINE HYDROCHLORIDE 10 MILLIGRAM(S): 10 TABLET, FILM COATED ORAL at 18:37

## 2022-11-08 RX ADMIN — HYDROMORPHONE HYDROCHLORIDE 30 MILLILITER(S): 2 INJECTION INTRAMUSCULAR; INTRAVENOUS; SUBCUTANEOUS at 20:19

## 2022-11-08 RX ADMIN — SODIUM CHLORIDE 75 MILLILITER(S): 9 INJECTION, SOLUTION INTRAVENOUS at 00:18

## 2022-11-08 RX ADMIN — Medication 100 GRAM(S): at 18:04

## 2022-11-08 RX ADMIN — SODIUM CHLORIDE 1000 MILLILITER(S): 9 INJECTION, SOLUTION INTRAVENOUS at 21:50

## 2022-11-08 RX ADMIN — SENNA PLUS 2 TABLET(S): 8.6 TABLET ORAL at 21:19

## 2022-11-08 RX ADMIN — Medication 500 MILLIGRAM(S): at 05:49

## 2022-11-08 RX ADMIN — PHENYLEPHRINE HYDROCHLORIDE 11.6 MICROGRAM(S)/KG/MIN: 10 INJECTION INTRAVENOUS at 18:48

## 2022-11-08 RX ADMIN — Medication 101.6 MILLIGRAM(S): at 18:45

## 2022-11-08 NOTE — PHYSICAL THERAPY INITIAL EVALUATION ADULT - MANUAL MUSCLE TESTING RESULTS, REHAB EVAL
b/l LEs 4/5, L UE 3+/5, R shoulder 0/5, R elbow/hand 2-/5 b/l LEs 4/5, L UE 3+/5, R shoulder 2+/5, R elbow/hand 3-/5

## 2022-11-08 NOTE — PHYSICAL THERAPY INITIAL EVALUATION ADULT - ADDITIONAL COMMENTS
Pt resides in a pvt home w/ spouse & son, 6 steps to enter, none inside. PTA pt was independent w/ ADLs & mobility, utilized a cane or RW. Son & spouse are available to assist, although both go to work. There is ~5hr period where pt is alone during the day.

## 2022-11-08 NOTE — PROGRESS NOTE ADULT - SUBJECTIVE AND OBJECTIVE BOX
Orthopaedic Surgery Progress Note    Subjective:   Patient seen and examined. No acute events overnight. NPO for OR today.    Objective:  Vitals 24hrs  Vital Signs Last 24 Hrs  T(C): 36.4 (08 Nov 2022 04:35), Max: 36.7 (07 Nov 2022 08:31)  T(F): 97.6 (08 Nov 2022 04:35), Max: 98.1 (07 Nov 2022 14:00)  HR: 79 (08 Nov 2022 04:35) (70 - 89)  BP: 101/69 (08 Nov 2022 04:35) (101/65 - 126/83)  BP(mean): --  RR: 18 (08 Nov 2022 04:35) (18 - 18)  SpO2: 99% (08 Nov 2022 04:35) (99% - 100%)    Parameters below as of 08 Nov 2022 04:35  Patient On (Oxygen Delivery Method): room air          11-06-22 @ 07:01  -  11-07-22 @ 07:00  --------------------------------------------------------  IN: 945 mL / OUT: 500 mL / NET: 445 mL    11-07-22 @ 07:01  -  11-08-22 @ 06:09  --------------------------------------------------------  IN: 925 mL / OUT: 1800 mL / NET: -875 mL        Lab Results 24hrs:                        13.8   7.53  )-----------( 241      ( 08 Nov 2022 05:20 )             41.9     11-08    140  |  104  |  14  ----------------------------<  102<H>  4.8   |  28  |  0.89    Ca    9.3      08 Nov 2022 05:24      PE  General: Awake alert pleasant ambulating no acute distress  Spine:  Skin intact. No obvious abrasions or lacerations appreciated. Soft collar in place.   TTP over cervical and thoracic midline. No palpable step off or deformity appreciated. No paraspinal TTP.  2-3 beats of clonus bilaterally. Positive Babinski bilaterally  Positive Shelton's bilaterally  DP/Radial pulses palpable  Compartments soft and compressible  No calf TTP    Motor:                   C5                C6              C7               C8           T1   R            4/5                4/5            4/5             4/5          3+/5  L             4/5               4/5            4/5             4/5          4/5                L2             L3             L4               L5            S1  R         5/5           5/5          5/5             5/5           5/5  L          5/5          5/5           5/5             5/5           5/5    Sensory:            C5         C6         C7      C8       T1        (0=absent, 1=impaired, 2=normal, NT=not testable)  R         2            2           2        2         2  L          2            2           2        2         2               L2          L3         L4      L5       S1         (0=absent, 1=impaired, 2=normal, NT=not testable)  R         2            2            2        2        2  L          2            2           2        2         2        57M with cervical spine myelopathy, plan for posterior cervical decompression and fusion with Dr. Casillas today  Plan:  NPO  IVF  Preop labs  soft C-collar  Consent in chart  Medical clearance in chart   Orthopaedic Surgery Progress Note    Subjective:   Patient was seen and examined. No acute events overnight. NPO for OR today.    Objective:  Vitals 24hrs  Vital Signs Last 24 Hrs  T(C): 36.4 (08 Nov 2022 04:35), Max: 36.7 (07 Nov 2022 08:31)  T(F): 97.6 (08 Nov 2022 04:35), Max: 98.1 (07 Nov 2022 14:00)  HR: 79 (08 Nov 2022 04:35) (70 - 89)  BP: 101/69 (08 Nov 2022 04:35) (101/65 - 126/83)  BP(mean): --  RR: 18 (08 Nov 2022 04:35) (18 - 18)  SpO2: 99% (08 Nov 2022 04:35) (99% - 100%)    Parameters below as of 08 Nov 2022 04:35  Patient On (Oxygen Delivery Method): room air          11-06-22 @ 07:01  -  11-07-22 @ 07:00  --------------------------------------------------------  IN: 945 mL / OUT: 500 mL / NET: 445 mL    11-07-22 @ 07:01  -  11-08-22 @ 06:09  --------------------------------------------------------  IN: 925 mL / OUT: 1800 mL / NET: -875 mL        Lab Results 24hrs:                        13.8   7.53  )-----------( 241      ( 08 Nov 2022 05:20 )             41.9     11-08    140  |  104  |  14  ----------------------------<  102<H>  4.8   |  28  |  0.89    Ca    9.3      08 Nov 2022 05:24      PE  General: Awake alert pleasant ambulating no acute distress  Spine:  Skin intact. No obvious abrasions or lacerations appreciated. Soft collar in place.   TTP over cervical and thoracic midline. No palpable step off or deformity appreciated. No paraspinal TTP.  2-3 beats of clonus bilaterally. Positive Babinski bilaterally  Positive Shelton's bilaterally  DP/Radial pulses palpable  Compartments soft and compressible  No calf TTP    Motor:                   C5                C6              C7               C8           T1   R            4/5                4/5            4/5             4/5          3+/5  L             4/5               4/5            4/5             4/5          4/5                L2             L3             L4               L5            S1  R         5/5           5/5          5/5             5/5           5/5  L          5/5          5/5           5/5             5/5           5/5    Sensory:            C5         C6         C7      C8       T1        (0=absent, 1=impaired, 2=normal, NT=not testable)  R         2            2           2        2         2  L          2            2           2        2         2               L2          L3         L4      L5       S1         (0=absent, 1=impaired, 2=normal, NT=not testable)  R         2            2            2        2        2  L          2            2           2        2         2        57M with cervical spine myelopathy, plan for posterior cervical decompression and fusion with Dr. Casillas today  Plan:  NPO  IVF  Preop labs  soft C-collar  Consent in chart  Medical clearance in chart

## 2022-11-08 NOTE — PROGRESS NOTE ADULT - SUBJECTIVE AND OBJECTIVE BOX
57M transferred from [facility] for evaluation and treatment of cervical myelopathy. He reports three years of right upper extremity weakness and gait unsteadiness after a fall onto back.  He denies seeing spine surgeon in past. No acute changes in RUE weakness or gait unsteadiness. Also reports new onset numbness L sole of foot. Denies pain/injury elsewhere. Denies tingling/paresthesia. Denies bowel/bladder incontinence. No saddle anesthesia. Denies fevers/chills. No other complaints at this time. Patient is scheduled for a PSF of C2-T2. Patient seen now resting comfortably. Patient seen post op in the PACU        MEDICATIONS  (STANDING):  acetaminophen     Tablet .. 975 milliGRAM(s) Oral every 8 hours  albumin human 25% IVPB 100 milliLiter(s) IV Intermittent once  ceFAZolin   IVPB 2000 milliGRAM(s) IV Intermittent every 8 hours  cyclobenzaprine 10 milliGRAM(s) Oral every 8 hours  dexAMETHasone  IVPB 8 milliGRAM(s) IV Intermittent every 8 hours  gabapentin 100 milliGRAM(s) Oral three times a day  HYDROmorphone PCA (1 mG/mL) 30 milliLiter(s) PCA Continuous PCA Continuous  lactated ringers. 1000 milliLiter(s) (120 mL/Hr) IV Continuous <Continuous>  pantoprazole    Tablet 40 milliGRAM(s) Oral before breakfast  polyethylene glycol 3350 17 Gram(s) Oral daily  senna 2 Tablet(s) Oral at bedtime    MEDICATIONS  (PRN):  benzocaine 15 mG/menthol 3.6 mG Lozenge 1 Lozenge Oral every 2 hours PRN Sore Throat  bisacodyl 5 milliGRAM(s) Oral every 12 hours PRN Constipation  diphenhydrAMINE 25 milliGRAM(s) Oral every 4 hours PRN Pruritus  HYDROmorphone  Injectable 0.5 milliGRAM(s) IV Push every 10 minutes PRN Moderate Pain (4 - 6)  HYDROmorphone  Injectable 1 milliGRAM(s) IV Push every 10 minutes PRN Severe Pain (7 - 10)  HYDROmorphone PCA (1 mG/mL) Rescue Clinician Bolus 0.5 milliGRAM(s) IV Push every 15 minutes PRN for Pain Scale GREATER THAN 6  magnesium hydroxide Suspension 30 milliLiter(s) Oral every 12 hours PRN Constipation  naloxone Injectable 0.1 milliGRAM(s) IV Push every 3 minutes PRN For ANY of the following changes in patient status:  A. RR LESS THAN 10 breaths per minute, B. Oxygen saturation LESS THAN 90%, C. Sedation score of 6  ondansetron Injectable 4 milliGRAM(s) IV Push every 6 hours PRN Nausea  ondansetron Injectable 4 milliGRAM(s) IV Push once PRN Nausea and/or Vomiting  oxyCODONE    IR 5 milliGRAM(s) Oral every 4 hours PRN Moderate Pain (4 - 6)  oxyCODONE    IR 10 milliGRAM(s) Oral every 4 hours PRN Severe Pain (7 - 10)  traMADol 50 milliGRAM(s) Oral every 6 hours PRN Mild Pain (1 - 3)          VITALS:   T(C): 36.4 (11-08-22 @ 04:35), Max: 36.7 (11-07-22 @ 18:42)  HR: 58 (11-08-22 @ 18:00) (58 - 123)  BP: 149/99 (11-08-22 @ 18:00) (89/54 - 149/99)  RR: 16 (11-08-22 @ 18:00) (16 - 18)  SpO2: 100% (11-08-22 @ 18:00) (99% - 100%)  Wt(kg): --    PHYSICAL EXAM:  GENERAL: NAD, well-groomed, well-developed  HEAD:  Atraumatic, Normocephalic  EYES: EOMI, PERRLA, conjunctiva and sclera clear  ENMT: No tonsillar erythema, exudates, or enlargement; Moist mucous membranes, Good dentition, No lesions  NECK: Supple, No JVD, Normal thyroid  NERVOUS SYSTEM:  Alert & Oriented X3, right sided weakness   CHEST/LUNG: Clear to percussion bilaterally; No rales, rhonchi, wheezing, or rubs  HEART: Regular rate and rhythm; No murmurs, rubs, or gallops  ABDOMEN: Soft, Nontender, Nondistended; Bowel sounds present  EXTREMITIES:  2+ Peripheral Pulses, No clubbing, cyanosis, or edema  LYMPH: No lymphadenopathy noted  SKIN: No rashes or lesions    LABS:  ABG - ( 08 Nov 2022 17:15 )  pH, Arterial: 7.42  pH, Blood: x     /  pCO2: 35    /  pO2: 138   / HCO3: 23    / Base Excess: -1.3  /  SaO2: 98.5                  CBC Full  -  ( 08 Nov 2022 17:27 )  WBC Count : 12.32 K/uL  RBC Count : 4.08 M/uL  Hemoglobin : 11.7 g/dL  Hematocrit : 34.7 %  Platelet Count - Automated : 267 K/uL  Mean Cell Volume : 85.0 fl  Mean Cell Hemoglobin : 28.7 pg  Mean Cell Hemoglobin Concentration : 33.7 gm/dL  Auto Neutrophil # : x  Auto Lymphocyte # : x  Auto Monocyte # : x  Auto Eosinophil # : x  Auto Basophil # : x  Auto Neutrophil % : x  Auto Lymphocyte % : x  Auto Monocyte % : x  Auto Eosinophil % : x  Auto Basophil % : x    11-08    145  |  110<H>  |  10  ----------------------------<  185<H>  4.0   |  22  |  0.76    Ca    7.7<L>      08 Nov 2022 17:27        PT/INR - ( 08 Nov 2022 05:21 )   PT: 12.0 sec;   INR: 1.03 ratio         PTT - ( 08 Nov 2022 05:21 )  PTT:28.4 sec    CAPILLARY BLOOD GLUCOSE          RADIOLOGY & ADDITIONAL TESTS:

## 2022-11-08 NOTE — PHYSICAL THERAPY INITIAL EVALUATION ADULT - PERTINENT HX OF CURRENT PROBLEM, REHAB EVAL
57 y.o. M transferred from [facility] for evaluation and treatment of cervical myelopathy. He reports three years of right upper extremity weakness and gait unsteadiness after a fall onto back.  He denies seeing spine surgeon in past. No acute changes in RUE weakness or gait unsteadiness. Also reports new onset numbness L sole of foot. Denies pain/injury elsewhere. Denies tingling/paresthesia. Denies bowel/bladder incontinence. No saddle anesthesia. Now s/p C2-T2 PSF, C3-6 lami on 11/8/22.

## 2022-11-08 NOTE — CONSULT NOTE ADULT - NS ATTEND AMEND GEN_ALL_CORE FT
Evaluated in RR   57yMale with PMHx cervical myelopathy s/p C2-T2 PSF, C3-6 Lami on 11/8.   Admitted for SICU for nisha gtt, MAP goal >90    Pain control with PCA  Dev b/l finger numbness and RLE weakness prior to surgery, exam unchaged, continue serial neuro checks  Saturating well on RA  Start Neosynephrine to maintain MAP goal of 90  Intravascular depleted with rising CONCEPCIÓN, fluid bolus, trend LA  Renal function electrolytes wnl  Monitor BS on systemic steroid. On steroid taper  HCT stable post op  On pharm DVT ppx with sq Heparin

## 2022-11-08 NOTE — BRIEF OPERATIVE NOTE - NSICDXBRIEFPROCEDURE_GEN_ALL_CORE_FT
PROCEDURES:  Fusion, spine, cervical, using posterolateral technique 08-Nov-2022 15:13:57  Jerzy Arana

## 2022-11-08 NOTE — PHYSICAL THERAPY INITIAL EVALUATION ADULT - PLANNED THERAPY INTERVENTIONS, PT EVAL
1. GOAL: In 3 weeks, pt will be able to navigate 6 steps w/ supervision./bed mobility training/gait training/transfer training

## 2022-11-08 NOTE — PHYSICAL THERAPY INITIAL EVALUATION ADULT - GENERAL OBSERVATIONS, REHAB EVAL
Rec'd seated in bedside chair, +IV drips, +PCA pump, +ICU monitoring, room air, +HMV, +CIELO drain, +wynn, +c-collar donned.

## 2022-11-09 LAB
A-TOCOPHEROL VIT E SERPL-MCNC: 9.6 MG/L — SIGNIFICANT CHANGE UP (ref 7–25.1)
ALBUMIN SERPL ELPH-MCNC: 3.7 G/DL — SIGNIFICANT CHANGE UP (ref 3.3–5)
ALP SERPL-CCNC: 48 U/L — SIGNIFICANT CHANGE UP (ref 40–120)
ALT FLD-CCNC: 52 U/L — HIGH (ref 10–45)
ANION GAP SERPL CALC-SCNC: 10 MMOL/L — SIGNIFICANT CHANGE UP (ref 5–17)
ANION GAP SERPL CALC-SCNC: 12 MMOL/L — SIGNIFICANT CHANGE UP (ref 5–17)
AST SERPL-CCNC: 53 U/L — HIGH (ref 10–40)
BETA+GAMMA TOCOPHEROL SERPL-MCNC: 1.7 MG/L — SIGNIFICANT CHANGE UP (ref 0.5–5.5)
BILIRUB SERPL-MCNC: 0.3 MG/DL — SIGNIFICANT CHANGE UP (ref 0.2–1.2)
BUN SERPL-MCNC: 12 MG/DL — SIGNIFICANT CHANGE UP (ref 7–23)
BUN SERPL-MCNC: 9 MG/DL — SIGNIFICANT CHANGE UP (ref 7–23)
CALCIUM SERPL-MCNC: 8.5 MG/DL — SIGNIFICANT CHANGE UP (ref 8.4–10.5)
CALCIUM SERPL-MCNC: 8.6 MG/DL — SIGNIFICANT CHANGE UP (ref 8.4–10.5)
CHLORIDE SERPL-SCNC: 104 MMOL/L — SIGNIFICANT CHANGE UP (ref 96–108)
CHLORIDE SERPL-SCNC: 105 MMOL/L — SIGNIFICANT CHANGE UP (ref 96–108)
CO2 SERPL-SCNC: 22 MMOL/L — SIGNIFICANT CHANGE UP (ref 22–31)
CO2 SERPL-SCNC: 24 MMOL/L — SIGNIFICANT CHANGE UP (ref 22–31)
CREAT SERPL-MCNC: 0.68 MG/DL — SIGNIFICANT CHANGE UP (ref 0.5–1.3)
CREAT SERPL-MCNC: 0.88 MG/DL — SIGNIFICANT CHANGE UP (ref 0.5–1.3)
EGFR: 100 ML/MIN/1.73M2 — SIGNIFICANT CHANGE UP
EGFR: 108 ML/MIN/1.73M2 — SIGNIFICANT CHANGE UP
GAS PNL BLDA: SIGNIFICANT CHANGE UP
GLUCOSE SERPL-MCNC: 163 MG/DL — HIGH (ref 70–99)
GLUCOSE SERPL-MCNC: 176 MG/DL — HIGH (ref 70–99)
HCT VFR BLD CALC: 32.2 % — LOW (ref 39–50)
HCT VFR BLD CALC: 32.8 % — LOW (ref 39–50)
HGB BLD-MCNC: 10.7 G/DL — LOW (ref 13–17)
HGB BLD-MCNC: 10.9 G/DL — LOW (ref 13–17)
MAGNESIUM SERPL-MCNC: 1.9 MG/DL — SIGNIFICANT CHANGE UP (ref 1.6–2.6)
MAGNESIUM SERPL-MCNC: 1.9 MG/DL — SIGNIFICANT CHANGE UP (ref 1.6–2.6)
MCHC RBC-ENTMCNC: 28.4 PG — SIGNIFICANT CHANGE UP (ref 27–34)
MCHC RBC-ENTMCNC: 28.5 PG — SIGNIFICANT CHANGE UP (ref 27–34)
MCHC RBC-ENTMCNC: 33.2 GM/DL — SIGNIFICANT CHANGE UP (ref 32–36)
MCHC RBC-ENTMCNC: 33.2 GM/DL — SIGNIFICANT CHANGE UP (ref 32–36)
MCV RBC AUTO: 85.4 FL — SIGNIFICANT CHANGE UP (ref 80–100)
MCV RBC AUTO: 85.6 FL — SIGNIFICANT CHANGE UP (ref 80–100)
NRBC # BLD: 0 /100 WBCS — SIGNIFICANT CHANGE UP (ref 0–0)
NRBC # BLD: 0 /100 WBCS — SIGNIFICANT CHANGE UP (ref 0–0)
PHOSPHATE SERPL-MCNC: 3.4 MG/DL — SIGNIFICANT CHANGE UP (ref 2.5–4.5)
PHOSPHATE SERPL-MCNC: 3.6 MG/DL — SIGNIFICANT CHANGE UP (ref 2.5–4.5)
PLATELET # BLD AUTO: 249 K/UL — SIGNIFICANT CHANGE UP (ref 150–400)
PLATELET # BLD AUTO: 269 K/UL — SIGNIFICANT CHANGE UP (ref 150–400)
POTASSIUM SERPL-MCNC: 4.1 MMOL/L — SIGNIFICANT CHANGE UP (ref 3.5–5.3)
POTASSIUM SERPL-MCNC: 4.2 MMOL/L — SIGNIFICANT CHANGE UP (ref 3.5–5.3)
POTASSIUM SERPL-SCNC: 4.1 MMOL/L — SIGNIFICANT CHANGE UP (ref 3.5–5.3)
POTASSIUM SERPL-SCNC: 4.2 MMOL/L — SIGNIFICANT CHANGE UP (ref 3.5–5.3)
PROT SERPL-MCNC: 5.9 G/DL — LOW (ref 6–8.3)
RBC # BLD: 3.77 M/UL — LOW (ref 4.2–5.8)
RBC # BLD: 3.83 M/UL — LOW (ref 4.2–5.8)
RBC # FLD: 14.6 % — HIGH (ref 10.3–14.5)
RBC # FLD: 14.8 % — HIGH (ref 10.3–14.5)
SODIUM SERPL-SCNC: 138 MMOL/L — SIGNIFICANT CHANGE UP (ref 135–145)
SODIUM SERPL-SCNC: 139 MMOL/L — SIGNIFICANT CHANGE UP (ref 135–145)
WBC # BLD: 14.18 K/UL — HIGH (ref 3.8–10.5)
WBC # BLD: 19.22 K/UL — HIGH (ref 3.8–10.5)
WBC # FLD AUTO: 14.18 K/UL — HIGH (ref 3.8–10.5)
WBC # FLD AUTO: 19.22 K/UL — HIGH (ref 3.8–10.5)

## 2022-11-09 PROCEDURE — 99233 SBSQ HOSP IP/OBS HIGH 50: CPT | Mod: GC

## 2022-11-09 RX ORDER — SODIUM CHLORIDE 9 MG/ML
500 INJECTION, SOLUTION INTRAVENOUS ONCE
Refills: 0 | Status: COMPLETED | OUTPATIENT
Start: 2022-11-09 | End: 2022-11-09

## 2022-11-09 RX ORDER — CHLORHEXIDINE GLUCONATE 213 G/1000ML
1 SOLUTION TOPICAL
Refills: 0 | Status: DISCONTINUED | OUTPATIENT
Start: 2022-11-09 | End: 2022-11-19

## 2022-11-09 RX ORDER — PHENYLEPHRINE HYDROCHLORIDE 10 MG/ML
0.4 INJECTION INTRAVENOUS
Qty: 40 | Refills: 0 | Status: DISCONTINUED | OUTPATIENT
Start: 2022-11-09 | End: 2022-11-10

## 2022-11-09 RX ORDER — SODIUM CHLORIDE 9 MG/ML
500 INJECTION, SOLUTION INTRAVENOUS
Refills: 0 | Status: DISCONTINUED | OUTPATIENT
Start: 2022-11-09 | End: 2022-11-09

## 2022-11-09 RX ORDER — INFLUENZA VIRUS VACCINE 15; 15; 15; 15 UG/.5ML; UG/.5ML; UG/.5ML; UG/.5ML
0.5 SUSPENSION INTRAMUSCULAR ONCE
Refills: 0 | Status: DISCONTINUED | OUTPATIENT
Start: 2022-11-09 | End: 2022-11-19

## 2022-11-09 RX ORDER — MAGNESIUM SULFATE 500 MG/ML
1 VIAL (ML) INJECTION ONCE
Refills: 0 | Status: COMPLETED | OUTPATIENT
Start: 2022-11-09 | End: 2022-11-09

## 2022-11-09 RX ORDER — SODIUM CHLORIDE 9 MG/ML
1000 INJECTION, SOLUTION INTRAVENOUS
Refills: 0 | Status: DISCONTINUED | OUTPATIENT
Start: 2022-11-09 | End: 2022-11-10

## 2022-11-09 RX ADMIN — SODIUM CHLORIDE 1000 MILLILITER(S): 9 INJECTION, SOLUTION INTRAVENOUS at 14:45

## 2022-11-09 RX ADMIN — SODIUM CHLORIDE 120 MILLILITER(S): 9 INJECTION, SOLUTION INTRAVENOUS at 17:52

## 2022-11-09 RX ADMIN — SENNA PLUS 2 TABLET(S): 8.6 TABLET ORAL at 21:05

## 2022-11-09 RX ADMIN — CYCLOBENZAPRINE HYDROCHLORIDE 10 MILLIGRAM(S): 10 TABLET, FILM COATED ORAL at 17:39

## 2022-11-09 RX ADMIN — PHENYLEPHRINE HYDROCHLORIDE 9.45 MICROGRAM(S)/KG/MIN: 10 INJECTION INTRAVENOUS at 18:08

## 2022-11-09 RX ADMIN — HYDROMORPHONE HYDROCHLORIDE 30 MILLILITER(S): 2 INJECTION INTRAMUSCULAR; INTRAVENOUS; SUBCUTANEOUS at 16:51

## 2022-11-09 RX ADMIN — SODIUM CHLORIDE 120 MILLILITER(S): 9 INJECTION, SOLUTION INTRAVENOUS at 11:29

## 2022-11-09 RX ADMIN — POLYETHYLENE GLYCOL 3350 17 GRAM(S): 17 POWDER, FOR SOLUTION ORAL at 11:29

## 2022-11-09 RX ADMIN — PHENYLEPHRINE HYDROCHLORIDE 11.6 MICROGRAM(S)/KG/MIN: 10 INJECTION INTRAVENOUS at 11:29

## 2022-11-09 RX ADMIN — Medication 975 MILLIGRAM(S): at 14:37

## 2022-11-09 RX ADMIN — Medication 975 MILLIGRAM(S): at 21:06

## 2022-11-09 RX ADMIN — Medication 101.6 MILLIGRAM(S): at 02:11

## 2022-11-09 RX ADMIN — GABAPENTIN 100 MILLIGRAM(S): 400 CAPSULE ORAL at 06:10

## 2022-11-09 RX ADMIN — Medication 100 GRAM(S): at 05:05

## 2022-11-09 RX ADMIN — SODIUM CHLORIDE 1000 MILLILITER(S): 9 INJECTION, SOLUTION INTRAVENOUS at 00:23

## 2022-11-09 RX ADMIN — PANTOPRAZOLE SODIUM 40 MILLIGRAM(S): 20 TABLET, DELAYED RELEASE ORAL at 06:10

## 2022-11-09 RX ADMIN — Medication 4 MILLIGRAM(S): at 21:06

## 2022-11-09 RX ADMIN — CYCLOBENZAPRINE HYDROCHLORIDE 10 MILLIGRAM(S): 10 TABLET, FILM COATED ORAL at 11:28

## 2022-11-09 RX ADMIN — Medication 101.6 MILLIGRAM(S): at 12:51

## 2022-11-09 RX ADMIN — GABAPENTIN 100 MILLIGRAM(S): 400 CAPSULE ORAL at 21:05

## 2022-11-09 RX ADMIN — CYCLOBENZAPRINE HYDROCHLORIDE 10 MILLIGRAM(S): 10 TABLET, FILM COATED ORAL at 02:10

## 2022-11-09 RX ADMIN — HYDROMORPHONE HYDROCHLORIDE 30 MILLILITER(S): 2 INJECTION INTRAMUSCULAR; INTRAVENOUS; SUBCUTANEOUS at 08:03

## 2022-11-09 RX ADMIN — GABAPENTIN 100 MILLIGRAM(S): 400 CAPSULE ORAL at 14:07

## 2022-11-09 RX ADMIN — SODIUM CHLORIDE 100 MILLILITER(S): 9 INJECTION, SOLUTION INTRAVENOUS at 22:28

## 2022-11-09 RX ADMIN — SODIUM CHLORIDE 1000 MILLILITER(S): 9 INJECTION, SOLUTION INTRAVENOUS at 18:08

## 2022-11-09 RX ADMIN — Medication 975 MILLIGRAM(S): at 06:14

## 2022-11-09 RX ADMIN — SODIUM CHLORIDE 1000 MILLILITER(S): 9 INJECTION, SOLUTION INTRAVENOUS at 08:23

## 2022-11-09 RX ADMIN — Medication 975 MILLIGRAM(S): at 06:11

## 2022-11-09 RX ADMIN — Medication 100 MILLIGRAM(S): at 06:11

## 2022-11-09 RX ADMIN — HYDROMORPHONE HYDROCHLORIDE 30 MILLILITER(S): 2 INJECTION INTRAMUSCULAR; INTRAVENOUS; SUBCUTANEOUS at 19:15

## 2022-11-09 RX ADMIN — Medication 975 MILLIGRAM(S): at 14:07

## 2022-11-09 NOTE — PROGRESS NOTE ADULT - SUBJECTIVE AND OBJECTIVE BOX
24 HOURS  Remains on low rate Dylan gtt    PAST MEDICAL HISTORY: None    PAST SURGICAL HISTORY: None    FAMILY HISTORY:     SOCIAL HISTORY:  Tobacco: former smoker  ETOH: occasionally  Drugs: Neg    CODE STATUS: Full    HOME MEDICATIONS:  None    ALLERGIES: No Known Allergies      VITAL SIGNS:  ICU Vital Signs Last 24 Hrs  T(C): 36.5 (09 Nov 2022 00:00), Max: 36.5 (09 Nov 2022 00:00)  T(F): 97.7 (09 Nov 2022 00:00), Max: 97.7 (09 Nov 2022 00:00)  HR: 101 (09 Nov 2022 00:30) (58 - 123)  BP: 121/69 (09 Nov 2022 00:00) (89/54 - 150/80)  BP(mean): 88 (09 Nov 2022 00:00) (66 - 118)  ABP: 145/75 (09 Nov 2022 00:30) (79/42 - 179/86)  ABP(mean): 104 (09 Nov 2022 00:30) (36 - 121)  RR: 14 (09 Nov 2022 00:30) (14 - 18)  SpO2: 100% (09 Nov 2022 00:30) (99% - 100%)    O2 Parameters below as of 09 Nov 2022 00:00  Patient On (Oxygen Delivery Method): room air            NEURO  Exam: AOx4. Able to move all extremities. Some R sided weakness consistent with prior. No new numbness, tingling. Incisional pain.  Meds:acetaminophen     Tablet .. 975 milliGRAM(s) Oral every 8 hours  cyclobenzaprine 10 milliGRAM(s) Oral every 8 hours  diphenhydrAMINE 25 milliGRAM(s) Oral every 4 hours PRN Pruritus  gabapentin 100 milliGRAM(s) Oral three times a day  HYDROmorphone PCA (1 mG/mL) 30 milliLiter(s) PCA Continuous PCA Continuous  HYDROmorphone PCA (1 mG/mL) Rescue Clinician Bolus 0.5 milliGRAM(s) IV Push every 15 minutes PRN for Pain Scale GREATER THAN 6  ondansetron Injectable 4 milliGRAM(s) IV Push every 6 hours PRN Nausea  ondansetron Injectable 4 milliGRAM(s) IV Push once PRN Nausea and/or Vomiting  oxyCODONE    IR 5 milliGRAM(s) Oral every 4 hours PRN Moderate Pain (4 - 6)  oxyCODONE    IR 10 milliGRAM(s) Oral every 4 hours PRN Severe Pain (7 - 10)  traMADol 50 milliGRAM(s) Oral every 6 hours PRN Mild Pain (1 - 3)      RESPIRATORY  Mechanical Ventilation:   ABG - ( 08 Nov 2022 22:39 )  pH: x     /  pCO2: x     /  pO2: x     / HCO3: x     / Base Excess: x     /  SaO2: x       Lactate: 3.4              Exam: Clear to auscultationb/l  Meds:    CARDIOVASCULAR    Exam: S1S2. No murmurs, rubs, gallops appreciated.   Cardiac Rhythm: NSR  Meds:phenylephrine    Infusion 0.5 MICROgram(s)/kG/Min IV Continuous <Continuous>      GI/NUTRITION  Exam: Soft, non-distended, non-tender  Diet: Regular  Meds:bisacodyl 5 milliGRAM(s) Oral every 12 hours PRN Constipation  magnesium hydroxide Suspension 30 milliLiter(s) Oral every 12 hours PRN Constipation  pantoprazole    Tablet 40 milliGRAM(s) Oral before breakfast  polyethylene glycol 3350 17 Gram(s) Oral daily  senna 2 Tablet(s) Oral at bedtime      GENITOURINARY/RENAL  Meds:lactated ringers. 1000 milliLiter(s) IV Continuous <Continuous>      11-07 @ 07:01  -  11-08 @ 07:00  --------------------------------------------------------  IN:    Lactated Ringers: 525 mL    Oral Fluid: 400 mL  Total IN: 925 mL    OUT:    Voided (mL): 1800 mL  Total OUT: 1800 mL    Total NET: -875 mL      11-08 @ 07:01  -  11-09 @ 01:09  --------------------------------------------------------  IN:    IV PiggyBack: 100 mL    IV PiggyBack: 150 mL    Lactated Ringers: 840 mL    Lactated Ringers Bolus: 1000 mL    Oral Fluid: 480 mL    Phenylephrine: 69.8 mL  Total IN: 2639.8 mL    OUT:    Accordian (mL): 110 mL    Bulb (mL): 70 mL    Indwelling Catheter - Urethral (mL): 1750 mL  Total OUT: 1930 mL    Total NET: 709.8 mL          11-08    145  |  110<H>  |  10  ----------------------------<  185<H>  4.0   |  22  |  0.76    Ca    7.7<L>      08 Nov 2022 17:27      [ ] Jimenez catheter, indication: urine output monitoring in critically ill patient    HEMATOLOGIC  [ ] VTE Prophylaxis:                          11.7   12.32 )-----------( 267      ( 08 Nov 2022 17:27 )             34.7     PT/INR - ( 08 Nov 2022 05:21 )   PT: 12.0 sec;   INR: 1.03 ratio         PTT - ( 08 Nov 2022 05:21 )  PTT:28.4 sec  Transfusion: [ ] PRBC	[ ] Platelets	[ ] FFP	[ ] Cryoprecipitate      INFECTIOUS DISEASES  Meds:ceFAZolin   IVPB 2000 milliGRAM(s) IV Intermittent every 8 hours    RECENT CULTURES:      ENDOCRINE  Meds:dexAMETHasone     Tablet 4 milliGRAM(s) Oral every 6 hours  dexAMETHasone  IVPB 8 milliGRAM(s) IV Intermittent every 8 hours    CAPILLARY BLOOD GLUCOSE          PATIENT CARE ACCESS DEVICES:  [ X ] Peripheral IV  [ ] Central Venous Line	[ ] R	[ ] L	[ ] IJ	[ ] Fem	[ ] SC	Placed:   [ ] Arterial Line		[ ] R	[ ] L	[ ] Fem	[ ] Rad	[ ] Ax	Placed:   [ ] PICC:					[ ] Mediport  [ ] Urinary Catheter, Date Placed:   [x] Necessity of urinary, arterial, and venous catheters discussed    OTHER MEDICATIONS: benzocaine 15 mG/menthol 3.6 mG Lozenge 1 Lozenge Oral every 2 hours PRN  naloxone Injectable 0.1 milliGRAM(s) IV Push every 3 minutes PRN

## 2022-11-09 NOTE — PROGRESS NOTE ADULT - SUBJECTIVE AND OBJECTIVE BOX
Day 1 of Anesthesia Pain Management Service    SUBJECTIVE: I'm doing ok    Pain Scale Score:	[X] Refer to charted pain scores    THERAPY:    [ ] IV PCA Morphine		[ ] 5 mg/mL	[ ] 1 mg/mL  [X] IV PCA Hydromorphone	[ ] 5 mg/mL	[X] 1 mg/mL  [ ] IV PCA Fentanyl		[ ] 50 micrograms/mL    Demand dose: 0.2 mg     Lockout: 6 minutes   Continuous Rate: 0 mg/hr  4 Hour Limit: 4 mg    MEDICATIONS  (STANDING):  acetaminophen     Tablet .. 975 milliGRAM(s) Oral every 8 hours  cyclobenzaprine 10 milliGRAM(s) Oral every 8 hours  dexAMETHasone     Tablet 4 milliGRAM(s) Oral every 6 hours  dexAMETHasone  IVPB 8 milliGRAM(s) IV Intermittent every 8 hours  gabapentin 100 milliGRAM(s) Oral three times a day  HYDROmorphone PCA (1 mG/mL) 30 milliLiter(s) PCA Continuous PCA Continuous  lactated ringers Bolus 500 milliLiter(s) IV Bolus once  lactated ringers. 1000 milliLiter(s) (120 mL/Hr) IV Continuous <Continuous>  pantoprazole    Tablet 40 milliGRAM(s) Oral before breakfast  phenylephrine    Infusion 0.5 MICROgram(s)/kG/Min (11.6 mL/Hr) IV Continuous <Continuous>  polyethylene glycol 3350 17 Gram(s) Oral daily  senna 2 Tablet(s) Oral at bedtime    MEDICATIONS  (PRN):  benzocaine 15 mG/menthol 3.6 mG Lozenge 1 Lozenge Oral every 2 hours PRN Sore Throat  bisacodyl 5 milliGRAM(s) Oral every 12 hours PRN Constipation  diphenhydrAMINE 25 milliGRAM(s) Oral every 4 hours PRN Pruritus  HYDROmorphone PCA (1 mG/mL) Rescue Clinician Bolus 0.5 milliGRAM(s) IV Push every 15 minutes PRN for Pain Scale GREATER THAN 6  magnesium hydroxide Suspension 30 milliLiter(s) Oral every 12 hours PRN Constipation  naloxone Injectable 0.1 milliGRAM(s) IV Push every 3 minutes PRN For ANY of the following changes in patient status:  A. RR LESS THAN 10 breaths per minute, B. Oxygen saturation LESS THAN 90%, C. Sedation score of 6  ondansetron Injectable 4 milliGRAM(s) IV Push every 6 hours PRN Nausea  ondansetron Injectable 4 milliGRAM(s) IV Push once PRN Nausea and/or Vomiting  oxyCODONE    IR 5 milliGRAM(s) Oral every 4 hours PRN Moderate Pain (4 - 6)  oxyCODONE    IR 10 milliGRAM(s) Oral every 4 hours PRN Severe Pain (7 - 10)  traMADol 50 milliGRAM(s) Oral every 6 hours PRN Mild Pain (1 - 3)      OBJECTIVE:    Sedation Score:	[ X] Alert 	[ ] Drowsy 	[ ] Arousable	[ ] Asleep	[ ] Unresponsive    Side Effects:	[X ] None	[ ] Nausea	[ ] Vomiting	[ ] Pruritus  		[ ] Other:    Vital Signs Last 24 Hrs  T(C): 36.9 (09 Nov 2022 08:00), Max: 36.9 (09 Nov 2022 08:00)  T(F): 98.4 (09 Nov 2022 08:00), Max: 98.4 (09 Nov 2022 08:00)  HR: 100 (09 Nov 2022 09:30) (58 - 123)  BP: 115/67 (09 Nov 2022 09:30) (89/54 - 150/80)  BP(mean): 85 (09 Nov 2022 09:30) (66 - 118)  RR: 16 (09 Nov 2022 09:15) (12 - 17)  SpO2: 100% (09 Nov 2022 09:30) (99% - 100%)    Parameters below as of 09 Nov 2022 10:00  Patient On (Oxygen Delivery Method): room air    O2 Concentration (%): 21    ASSESSMENT/ PLAN    Therapy to  be:               [X] Continued   [ ] Discontinued   [ ] Changed to PRN Analgesics    Documentation and Verification of current medications:   [X] Done	[ ] Not done, not eligible    Comments: Endorsing good analgesia with PCA. On Dylan gtt. Reeducated to PCA use.

## 2022-11-09 NOTE — PROGRESS NOTE ADULT - SUBJECTIVE AND OBJECTIVE BOX
Currently getting q1h neuro checks. Per patient upper extremity numbness improving. NAEO    T(C): 36.8 (11-09-22 @ 19:00), Max: 36.9 (11-09-22 @ 08:00)  HR: 97 (11-09-22 @ 19:30) (69 - 121)  BP: 134/87 (11-09-22 @ 19:30) (88/60 - 152/90)  RR: 15 (11-09-22 @ 19:30) (12 - 27)  SpO2: 100% (11-09-22 @ 19:30) (96% - 100%)    GENERAL: patient appears well, no acute distress, appropriate  BACK/SPINE: C collar in place. Dressing c/d/i. No hematoma or seroma  Hemovac and CIELO both with SS output       LABS:                        10.7   19.22 )-----------( 269      ( 09 Nov 2022 14:39 )             32.2     11-09    138  |  104  |  12  ----------------------------<  176<H>  4.1   |  22  |  0.88    Ca    8.6      09 Nov 2022 14:39  Phos  3.4     11-09  Mg     1.9     11-09    TPro  5.9<L>  /  Alb  3.7  /  TBili  0.3  /  DBili  x   /  AST  53<H>  /  ALT  52<H>  /  AlkPhos  48  11-09    PT/INR - ( 08 Nov 2022 05:21 )   PT: 12.0 sec;   INR: 1.03 ratio         PTT - ( 08 Nov 2022 05:21 )  PTT:28.4 sec

## 2022-11-09 NOTE — PROGRESS NOTE ADULT - SUBJECTIVE AND OBJECTIVE BOX
Orthopaedic Surgery Progress Note    Subjective:   Patient seen and examined. No acute events overnight. In PACU under SICu care for Q1 neuro checks and MAP goals posoperatively    Objective:  T(C): 36.5 (11-09-22 @ 03:00), Max: 36.5 (11-09-22 @ 00:00)  HR: 75 (11-09-22 @ 05:00) (58 - 123)  BP: 120/76 (11-09-22 @ 05:00) (89/54 - 150/80)  RR: 14 (11-09-22 @ 05:00) (12 - 17)  SpO2: 100% (11-09-22 @ 05:00) (99% - 100%)  Wt(kg): --    11-07 @ 07:01  -  11-08 @ 07:00  --------------------------------------------------------  IN: 925 mL / OUT: 1800 mL / NET: -875 mL    11-08 @ 07:01  -  11-09 @ 06:14  --------------------------------------------------------  IN: 3855.3 mL / OUT: 3405 mL / NET: 450.3 mL      PE  General: NAD   Spine:  C collar in place   HV and CIELO drains in place with SS output   Positive clonus     Motor:                   C5                C6              C7               C8           T1   R            3/5                3+/5            4/5             4/5          4/5  L             4/5               4/5            4/5             4/5          4/5                L2             L3             L4               L5            S1  R         5/5           5/5          5/5             5/5           5/5  L          5/5          5/5           5/5             5/5           5/5    Sensory:            C5         C6         C7      C8       T1        (0=absent, 1=impaired, 2=normal, NT=not testable)  R         2            2           2        2         2  L          2            2           2        2         2               L2          L3         L4      L5       S1         (0=absent, 1=impaired, 2=normal, NT=not testable)  R         2            2            2        2        2  L          2            2           2        2         2                          10.9   14.18 )-----------( 249      ( 09 Nov 2022 02:18 )             32.8     11-09    139  |  105  |  9   ----------------------------<  163<H>  4.2   |  24  |  0.68    Ca    8.5      09 Nov 2022 02:17  Phos  3.6     11-09  Mg     1.9     11-09      PT/INR - ( 08 Nov 2022 05:21 )   PT: 12.0 sec;   INR: 1.03 ratio         PTT - ( 08 Nov 2022 05:21 )  PTT:28.4 sec      57y Male s/p C2-T2 PSF, C3-C6 lami, recovering appropriately  - Pain control (PCA)  - FU labs  - SICU for 24 hours of Q1 neuro checks and MAP goals >90  - WBAT  - PT/OT/OOB  - I/S  - Monitor HMV output  - SCDs  - C Collar  - Dispo planning Sepsis

## 2022-11-09 NOTE — PATIENT PROFILE ADULT - FALL HARM RISK - UNIVERSAL INTERVENTIONS
Bed in lowest position, wheels locked, appropriate side rails in place/Call bell, personal items and telephone in reach/Instruct patient to call for assistance before getting out of bed or chair/Non-slip footwear when patient is out of bed/[place] to call system/Physically safe environment - no spills, clutter or unnecessary equipment/Purposeful Proactive Rounding/Room/bathroom lighting operational, light cord in reach

## 2022-11-09 NOTE — PROGRESS NOTE ADULT - SUBJECTIVE AND OBJECTIVE BOX
57M transferred from [facility] for evaluation and treatment of cervical myelopathy. He reports three years of right upper extremity weakness and gait unsteadiness after a fall onto back.  He denies seeing spine surgeon in past. No acute changes in RUE weakness or gait unsteadiness. Also reports new onset numbness L sole of foot. Denies pain/injury elsewhere. Denies tingling/paresthesia. Denies bowel/bladder incontinence. No saddle anesthesia. Denies fevers/chills. No other complaints at this time. Patient is scheduled for a PSF of C2-T2. Patient seen now resting comfortably. Patient seen post op in the PACU. Patient currently on pressors to keep perfusion pressure elevated      MEDICATIONS  (STANDING):  acetaminophen     Tablet .. 975 milliGRAM(s) Oral every 8 hours  cyclobenzaprine 10 milliGRAM(s) Oral every 8 hours  dexAMETHasone     Tablet 4 milliGRAM(s) Oral every 6 hours  gabapentin 100 milliGRAM(s) Oral three times a day  HYDROmorphone PCA (1 mG/mL) 30 milliLiter(s) PCA Continuous PCA Continuous  influenza   Vaccine 0.5 milliLiter(s) IntraMuscular once  lactated ringers. 1000 milliLiter(s) (120 mL/Hr) IV Continuous <Continuous>  pantoprazole    Tablet 40 milliGRAM(s) Oral before breakfast  phenylephrine    Infusion 0.5 MICROgram(s)/kG/Min (11.6 mL/Hr) IV Continuous <Continuous>  polyethylene glycol 3350 17 Gram(s) Oral daily  senna 2 Tablet(s) Oral at bedtime    MEDICATIONS  (PRN):  benzocaine 15 mG/menthol 3.6 mG Lozenge 1 Lozenge Oral every 2 hours PRN Sore Throat  bisacodyl 5 milliGRAM(s) Oral every 12 hours PRN Constipation  diphenhydrAMINE 25 milliGRAM(s) Oral every 4 hours PRN Pruritus  HYDROmorphone PCA (1 mG/mL) Rescue Clinician Bolus 0.5 milliGRAM(s) IV Push every 15 minutes PRN for Pain Scale GREATER THAN 6  magnesium hydroxide Suspension 30 milliLiter(s) Oral every 12 hours PRN Constipation  naloxone Injectable 0.1 milliGRAM(s) IV Push every 3 minutes PRN For ANY of the following changes in patient status:  A. RR LESS THAN 10 breaths per minute, B. Oxygen saturation LESS THAN 90%, C. Sedation score of 6  ondansetron Injectable 4 milliGRAM(s) IV Push every 6 hours PRN Nausea  ondansetron Injectable 4 milliGRAM(s) IV Push once PRN Nausea and/or Vomiting  oxyCODONE    IR 5 milliGRAM(s) Oral every 4 hours PRN Moderate Pain (4 - 6)  oxyCODONE    IR 10 milliGRAM(s) Oral every 4 hours PRN Severe Pain (7 - 10)  traMADol 50 milliGRAM(s) Oral every 6 hours PRN Mild Pain (1 - 3)          VITALS:   T(C): 36.9 (11-09-22 @ 08:00), Max: 36.9 (11-09-22 @ 08:00)  HR: 105 (11-09-22 @ 15:00) (58 - 123)  BP: 129/61 (11-09-22 @ 15:00) (88/60 - 150/80)  RR: 17 (11-09-22 @ 15:00) (12 - 20)  SpO2: 99% (11-09-22 @ 15:00) (96% - 100%)  Wt(kg): --    PHYSICAL EXAM:  GENERAL: NAD, well-groomed, well-developed  HEAD:  Atraumatic, Normocephalic  EYES: EOMI, PERRLA, conjunctiva and sclera clear  ENMT: No tonsillar erythema, exudates, or enlargement; Moist mucous membranes, Good dentition, No lesions  NECK: Supple, No JVD, Normal thyroid  NERVOUS SYSTEM:  Alert & Oriented X3, right sided weakness   CHEST/LUNG: Clear to percussion bilaterally; No rales, rhonchi, wheezing, or rubs  HEART: Regular rate and rhythm; No murmurs, rubs, or gallops  ABDOMEN: Soft, Nontender, Nondistended; Bowel sounds present  EXTREMITIES:  2+ Peripheral Pulses, No clubbing, cyanosis, or edema  LYMPH: No lymphadenopathy noted  SKIN: No rashes or lesions    LABS:  ABG - ( 09 Nov 2022 14:30 )  pH, Arterial: 7.39  pH, Blood: x     /  pCO2: 35    /  pO2: 116   / HCO3: 21    / Base Excess: -3.3  /  SaO2: 99.3                  CBC Full  -  ( 09 Nov 2022 14:39 )  WBC Count : 19.22 K/uL  RBC Count : 3.77 M/uL  Hemoglobin : 10.7 g/dL  Hematocrit : 32.2 %  Platelet Count - Automated : 269 K/uL  Mean Cell Volume : 85.4 fl  Mean Cell Hemoglobin : 28.4 pg  Mean Cell Hemoglobin Concentration : 33.2 gm/dL  Auto Neutrophil # : x  Auto Lymphocyte # : x  Auto Monocyte # : x  Auto Eosinophil # : x  Auto Basophil # : x  Auto Neutrophil % : x  Auto Lymphocyte % : x  Auto Monocyte % : x  Auto Eosinophil % : x  Auto Basophil % : x    11-09    138  |  104  |  12  ----------------------------<  176<H>  4.1   |  22  |  0.88    Ca    8.6      09 Nov 2022 14:39  Phos  3.4     11-09  Mg     1.9     11-09    TPro  5.9<L>  /  Alb  3.7  /  TBili  0.3  /  DBili  x   /  AST  53<H>  /  ALT  52<H>  /  AlkPhos  48  11-09    LIVER FUNCTIONS - ( 09 Nov 2022 14:39 )  Alb: 3.7 g/dL / Pro: 5.9 g/dL / ALK PHOS: 48 U/L / ALT: 52 U/L / AST: 53 U/L / GGT: x           PT/INR - ( 08 Nov 2022 05:21 )   PT: 12.0 sec;   INR: 1.03 ratio         PTT - ( 08 Nov 2022 05:21 )  PTT:28.4 sec    CAPILLARY BLOOD GLUCOSE          RADIOLOGY & ADDITIONAL TESTS:

## 2022-11-09 NOTE — PROGRESS NOTE ADULT - NS ATTEND AMEND GEN_ALL_CORE FT
57yr M hx of cervical myelopathy w upper ext weakness s/p C2-T2 PSF lami C3-6 11/8, in PACU alert and awake and conversant  no acute pain improved motor and sensation  per spine surgery map goal of 90, nisha 0.3  monitor urine output  on diet  q1hr neuro checks

## 2022-11-10 LAB
A-TOCOPHEROL VIT E SERPL-MCNC: 8.4 MG/L — SIGNIFICANT CHANGE UP (ref 7–25.1)
ANION GAP SERPL CALC-SCNC: 8 MMOL/L — SIGNIFICANT CHANGE UP (ref 5–17)
BETA+GAMMA TOCOPHEROL SERPL-MCNC: 2.2 MG/L — SIGNIFICANT CHANGE UP (ref 0.5–5.5)
BUN SERPL-MCNC: 18 MG/DL — SIGNIFICANT CHANGE UP (ref 7–23)
CALCIUM SERPL-MCNC: 8.1 MG/DL — LOW (ref 8.4–10.5)
CHLORIDE SERPL-SCNC: 105 MMOL/L — SIGNIFICANT CHANGE UP (ref 96–108)
CO2 SERPL-SCNC: 27 MMOL/L — SIGNIFICANT CHANGE UP (ref 22–31)
CREAT SERPL-MCNC: 0.9 MG/DL — SIGNIFICANT CHANGE UP (ref 0.5–1.3)
EGFR: 100 ML/MIN/1.73M2 — SIGNIFICANT CHANGE UP
GLUCOSE BLDC GLUCOMTR-MCNC: 180 MG/DL — HIGH (ref 70–99)
GLUCOSE BLDC GLUCOMTR-MCNC: 180 MG/DL — HIGH (ref 70–99)
GLUCOSE BLDC GLUCOMTR-MCNC: 204 MG/DL — HIGH (ref 70–99)
GLUCOSE SERPL-MCNC: 191 MG/DL — HIGH (ref 70–99)
HCT VFR BLD CALC: 30.1 % — LOW (ref 39–50)
HGB BLD-MCNC: 9.8 G/DL — LOW (ref 13–17)
MAGNESIUM SERPL-MCNC: 1.8 MG/DL — SIGNIFICANT CHANGE UP (ref 1.6–2.6)
MCHC RBC-ENTMCNC: 28.1 PG — SIGNIFICANT CHANGE UP (ref 27–34)
MCHC RBC-ENTMCNC: 32.6 GM/DL — SIGNIFICANT CHANGE UP (ref 32–36)
MCV RBC AUTO: 86.2 FL — SIGNIFICANT CHANGE UP (ref 80–100)
NRBC # BLD: 0 /100 WBCS — SIGNIFICANT CHANGE UP (ref 0–0)
PHOSPHATE SERPL-MCNC: 2.7 MG/DL — SIGNIFICANT CHANGE UP (ref 2.5–4.5)
PLATELET # BLD AUTO: 238 K/UL — SIGNIFICANT CHANGE UP (ref 150–400)
POTASSIUM SERPL-MCNC: 3.9 MMOL/L — SIGNIFICANT CHANGE UP (ref 3.5–5.3)
POTASSIUM SERPL-SCNC: 3.9 MMOL/L — SIGNIFICANT CHANGE UP (ref 3.5–5.3)
RBC # BLD: 3.49 M/UL — LOW (ref 4.2–5.8)
RBC # FLD: 15 % — HIGH (ref 10.3–14.5)
SODIUM SERPL-SCNC: 140 MMOL/L — SIGNIFICANT CHANGE UP (ref 135–145)
WBC # BLD: 17.91 K/UL — HIGH (ref 3.8–10.5)
WBC # FLD AUTO: 17.91 K/UL — HIGH (ref 3.8–10.5)

## 2022-11-10 PROCEDURE — 99291 CRITICAL CARE FIRST HOUR: CPT | Mod: GC

## 2022-11-10 PROCEDURE — 71045 X-RAY EXAM CHEST 1 VIEW: CPT | Mod: 26

## 2022-11-10 RX ORDER — PHENYLEPHRINE HYDROCHLORIDE 10 MG/ML
0.5 INJECTION INTRAVENOUS
Qty: 40 | Refills: 0 | Status: DISCONTINUED | OUTPATIENT
Start: 2022-11-10 | End: 2022-11-10

## 2022-11-10 RX ORDER — POTASSIUM PHOSPHATE, MONOBASIC POTASSIUM PHOSPHATE, DIBASIC 236; 224 MG/ML; MG/ML
15 INJECTION, SOLUTION INTRAVENOUS ONCE
Refills: 0 | Status: COMPLETED | OUTPATIENT
Start: 2022-11-10 | End: 2022-11-10

## 2022-11-10 RX ORDER — OXYCODONE HYDROCHLORIDE 5 MG/1
5 TABLET ORAL EVERY 4 HOURS
Refills: 0 | Status: DISCONTINUED | OUTPATIENT
Start: 2022-11-10 | End: 2022-11-17

## 2022-11-10 RX ORDER — OXYCODONE HYDROCHLORIDE 5 MG/1
10 TABLET ORAL EVERY 4 HOURS
Refills: 0 | Status: DISCONTINUED | OUTPATIENT
Start: 2022-11-10 | End: 2022-11-13

## 2022-11-10 RX ORDER — MAGNESIUM SULFATE 500 MG/ML
2 VIAL (ML) INJECTION ONCE
Refills: 0 | Status: COMPLETED | OUTPATIENT
Start: 2022-11-10 | End: 2022-11-10

## 2022-11-10 RX ORDER — SODIUM CHLORIDE 9 MG/ML
1000 INJECTION, SOLUTION INTRAVENOUS ONCE
Refills: 0 | Status: COMPLETED | OUTPATIENT
Start: 2022-11-10 | End: 2022-11-10

## 2022-11-10 RX ORDER — INSULIN LISPRO 100/ML
VIAL (ML) SUBCUTANEOUS EVERY 6 HOURS
Refills: 0 | Status: DISCONTINUED | OUTPATIENT
Start: 2022-11-10 | End: 2022-11-14

## 2022-11-10 RX ORDER — HYDROMORPHONE HYDROCHLORIDE 2 MG/ML
0.5 INJECTION INTRAMUSCULAR; INTRAVENOUS; SUBCUTANEOUS EVERY 4 HOURS
Refills: 0 | Status: DISCONTINUED | OUTPATIENT
Start: 2022-11-10 | End: 2022-11-10

## 2022-11-10 RX ORDER — POLYETHYLENE GLYCOL 3350 17 G/17G
17 POWDER, FOR SOLUTION ORAL EVERY 12 HOURS
Refills: 0 | Status: DISCONTINUED | OUTPATIENT
Start: 2022-11-10 | End: 2022-11-19

## 2022-11-10 RX ORDER — SODIUM CHLORIDE 9 MG/ML
1000 INJECTION INTRAMUSCULAR; INTRAVENOUS; SUBCUTANEOUS
Refills: 0 | Status: DISCONTINUED | OUTPATIENT
Start: 2022-11-10 | End: 2022-11-10

## 2022-11-10 RX ORDER — TRAMADOL HYDROCHLORIDE 50 MG/1
50 TABLET ORAL EVERY 8 HOURS
Refills: 0 | Status: DISCONTINUED | OUTPATIENT
Start: 2022-11-10 | End: 2022-11-11

## 2022-11-10 RX ADMIN — SODIUM CHLORIDE 1000 MILLILITER(S): 9 INJECTION, SOLUTION INTRAVENOUS at 07:25

## 2022-11-10 RX ADMIN — CHLORHEXIDINE GLUCONATE 1 APPLICATION(S): 213 SOLUTION TOPICAL at 05:08

## 2022-11-10 RX ADMIN — Medication 975 MILLIGRAM(S): at 15:40

## 2022-11-10 RX ADMIN — Medication 4 MILLIGRAM(S): at 03:15

## 2022-11-10 RX ADMIN — Medication 975 MILLIGRAM(S): at 05:08

## 2022-11-10 RX ADMIN — GABAPENTIN 100 MILLIGRAM(S): 400 CAPSULE ORAL at 21:20

## 2022-11-10 RX ADMIN — Medication 2: at 11:55

## 2022-11-10 RX ADMIN — Medication 4 MILLIGRAM(S): at 11:48

## 2022-11-10 RX ADMIN — Medication 975 MILLIGRAM(S): at 23:56

## 2022-11-10 RX ADMIN — CYCLOBENZAPRINE HYDROCHLORIDE 10 MILLIGRAM(S): 10 TABLET, FILM COATED ORAL at 17:38

## 2022-11-10 RX ADMIN — SODIUM CHLORIDE 20 MILLILITER(S): 9 INJECTION INTRAMUSCULAR; INTRAVENOUS; SUBCUTANEOUS at 00:13

## 2022-11-10 RX ADMIN — HYDROMORPHONE HYDROCHLORIDE 30 MILLILITER(S): 2 INJECTION INTRAMUSCULAR; INTRAVENOUS; SUBCUTANEOUS at 07:23

## 2022-11-10 RX ADMIN — Medication 25 GRAM(S): at 04:24

## 2022-11-10 RX ADMIN — PHENYLEPHRINE HYDROCHLORIDE 11.8 MICROGRAM(S)/KG/MIN: 10 INJECTION INTRAVENOUS at 01:15

## 2022-11-10 RX ADMIN — GABAPENTIN 100 MILLIGRAM(S): 400 CAPSULE ORAL at 14:48

## 2022-11-10 RX ADMIN — SENNA PLUS 2 TABLET(S): 8.6 TABLET ORAL at 21:20

## 2022-11-10 RX ADMIN — Medication 975 MILLIGRAM(S): at 14:47

## 2022-11-10 RX ADMIN — CYCLOBENZAPRINE HYDROCHLORIDE 10 MILLIGRAM(S): 10 TABLET, FILM COATED ORAL at 11:48

## 2022-11-10 RX ADMIN — CYCLOBENZAPRINE HYDROCHLORIDE 10 MILLIGRAM(S): 10 TABLET, FILM COATED ORAL at 03:00

## 2022-11-10 RX ADMIN — POLYETHYLENE GLYCOL 3350 17 GRAM(S): 17 POWDER, FOR SOLUTION ORAL at 17:43

## 2022-11-10 RX ADMIN — TRAMADOL HYDROCHLORIDE 50 MILLIGRAM(S): 50 TABLET ORAL at 21:50

## 2022-11-10 RX ADMIN — Medication 3 MILLIGRAM(S): at 17:40

## 2022-11-10 RX ADMIN — POTASSIUM PHOSPHATE, MONOBASIC POTASSIUM PHOSPHATE, DIBASIC 62.5 MILLIMOLE(S): 236; 224 INJECTION, SOLUTION INTRAVENOUS at 04:21

## 2022-11-10 RX ADMIN — TRAMADOL HYDROCHLORIDE 50 MILLIGRAM(S): 50 TABLET ORAL at 21:20

## 2022-11-10 RX ADMIN — Medication 3 MILLIGRAM(S): at 23:27

## 2022-11-10 RX ADMIN — GABAPENTIN 100 MILLIGRAM(S): 400 CAPSULE ORAL at 05:06

## 2022-11-10 RX ADMIN — OXYCODONE HYDROCHLORIDE 10 MILLIGRAM(S): 5 TABLET ORAL at 17:41

## 2022-11-10 RX ADMIN — SODIUM CHLORIDE 20 MILLILITER(S): 9 INJECTION INTRAMUSCULAR; INTRAVENOUS; SUBCUTANEOUS at 07:24

## 2022-11-10 RX ADMIN — PANTOPRAZOLE SODIUM 40 MILLIGRAM(S): 20 TABLET, DELAYED RELEASE ORAL at 05:08

## 2022-11-10 RX ADMIN — Medication 975 MILLIGRAM(S): at 23:26

## 2022-11-10 RX ADMIN — Medication 4 MILLIGRAM(S): at 14:48

## 2022-11-10 NOTE — PROGRESS NOTE ADULT - SUBJECTIVE AND OBJECTIVE BOX
Pt seen/examined. Doing well. Pain controlled. No acute overnight complaints or events.    T(C): 36.7 (11-10-22 @ 03:00), Max: 36.9 (11-09-22 @ 08:00)  HR: 98 (11-10-22 @ 06:15) (84 - 130)  BP: 98/53 (11-10-22 @ 06:15) (79/49 - 174/79)  RR: 13 (11-10-22 @ 06:15) (12 - 30)  SpO2: 96% (11-10-22 @ 06:15) (95% - 100%)  Wt(kg): --  - Gen: NAD    PE  General: NAD   Spine:  C collar in place   HV and CIELO drains in place with SS output   Positive clonus     Motor:                   C5                C6              C7               C8           T1   R            3/5                3+/5            4/5             4/5          4/5  L             4/5               4/5            4/5             4/5          4/5                L2             L3             L4               L5            S1  R         5/5           5/5          5/5             5/5           5/5  L          5/5          5/5           5/5             5/5           5/5    Sensory:            C5         C6         C7      C8       T1        (0=absent, 1=impaired, 2=normal, NT=not testable)  R         2            2           2        2         2  L          2            2           2        2         2               L2          L3         L4      L5       S1         (0=absent, 1=impaired, 2=normal, NT=not testable)  R         2            2            2        2        2  L          2            2           2        2         2    57y Male s/p C2-T2 PSF, C3-C6 lami, recovering appropriately    - Pain control (PCA)  - FU labs  - SICU for 24 hours of Q1 neuro checks and MAP goals >90  - WBAT  - PT/OT/OOB  - I/S  - Monitor HMV output  - SCDs  - C Collar  - Dispo planning

## 2022-11-10 NOTE — PROGRESS NOTE ADULT - SUBJECTIVE AND OBJECTIVE BOX
24 HOUR EVENTS:  - Per ortho note, needs q1 hour neurocheck and MAP goal >90 for 24 hours  - monitored off of nisha, MAP dropped to 63, nisha restarted     SUBJECTIVE/ROS:  Patient seen at bedside this AM.      OBJECTIVE:    VITALS:  Vital Signs Last 24 Hrs  T(C): 36.8 (09 Nov 2022 23:00), Max: 36.9 (09 Nov 2022 08:00)  T(F): 98.2 (09 Nov 2022 23:00), Max: 98.4 (09 Nov 2022 08:00)  HR: 110 (10 Nov 2022 01:01) (69 - 130)  BP: 84/51 (10 Nov 2022 01:01) (84/51 - 174/79)  BP(mean): 63 (10 Nov 2022 01:01) (63 - 113)  RR: 17 (10 Nov 2022 01:01) (12 - 30)  SpO2: 96% (10 Nov 2022 01:01) (95% - 100%)    Parameters below as of 09 Nov 2022 19:00  Patient On (Oxygen Delivery Method): room air        I&O's Summary    08 Nov 2022 07:01  -  09 Nov 2022 07:00  --------------------------------------------------------  IN: 4459.5 mL / OUT: 4175 mL / NET: 284.5 mL    09 Nov 2022 07:01  -  10 Nov 2022 01:19  --------------------------------------------------------  IN: 4303.4 mL / OUT: 2910 mL / NET: 1393.4 mL        PHYSICAL EXAM:    NEURO  RASS:     GCS:     CAM ICU:  Exam:      RESPIRATORY  Exam:    Mechanical Ventilation:     CARDIOVASCULAR  Exam:    Cardiac Rhythm:      GI/NUTRITION  Exam:      GENITOURINARY  Exam:     ACCESS DEVICES:  [ ] Peripheral IV  [ ] Central Venous Line	[ ] R	[ ] L	[ ] IJ	[ ] Fem	[ ] SC	Placed:   [ ] Arterial Line		[ ] R	[ ] L	[ ] Fem	[ ] Rad	[ ] Ax	Placed:   [ ] PICC:					[ ] Mediport  [ ] Urinary Catheter, Date Placed:   [x] Necessity of urinary, arterial, and venous catheters discussed      LABS:                        10.7   19.22 )-----------( 269      ( 09 Nov 2022 14:39 )             32.2   11-09    138  |  104  |  12  ----------------------------<  176<H>  4.1   |  22  |  0.88    Ca    8.6      09 Nov 2022 14:39  Phos  3.4     11-09  Mg     1.9     11-09    TPro  5.9<L>  /  Alb  3.7  /  TBili  0.3  /  DBili  x   /  AST  53<H>  /  ALT  52<H>  /  AlkPhos  48  11-09    CAPILLARY BLOOD GLUCOSE          MEDICATIONS:   MEDICATIONS  (STANDING):  acetaminophen     Tablet .. 975 milliGRAM(s) Oral every 8 hours  chlorhexidine 2% Cloths 1 Application(s) Topical <User Schedule>  cyclobenzaprine 10 milliGRAM(s) Oral every 8 hours  dexAMETHasone     Tablet 4 milliGRAM(s) Oral every 6 hours  dexAMETHasone     Tablet 3 milliGRAM(s) Oral every 6 hours  gabapentin 100 milliGRAM(s) Oral three times a day  HYDROmorphone PCA (1 mG/mL) 30 milliLiter(s) PCA Continuous PCA Continuous  influenza   Vaccine 0.5 milliLiter(s) IntraMuscular once  pantoprazole    Tablet 40 milliGRAM(s) Oral before breakfast  phenylephrine    Infusion 0.5 MICROgram(s)/kG/Min (11.8 mL/Hr) IV Continuous <Continuous>  polyethylene glycol 3350 17 Gram(s) Oral daily  senna 2 Tablet(s) Oral at bedtime  sodium chloride 0.9%. 1000 milliLiter(s) (20 mL/Hr) IV Continuous <Continuous>    MEDICATIONS  (PRN):  benzocaine 15 mG/menthol 3.6 mG Lozenge 1 Lozenge Oral every 2 hours PRN Sore Throat  bisacodyl 5 milliGRAM(s) Oral every 12 hours PRN Constipation  diphenhydrAMINE 25 milliGRAM(s) Oral every 4 hours PRN Pruritus  HYDROmorphone PCA (1 mG/mL) Rescue Clinician Bolus 0.5 milliGRAM(s) IV Push every 15 minutes PRN for Pain Scale GREATER THAN 6  magnesium hydroxide Suspension 30 milliLiter(s) Oral every 12 hours PRN Constipation  naloxone Injectable 0.1 milliGRAM(s) IV Push every 3 minutes PRN For ANY of the following changes in patient status:  A. RR LESS THAN 10 breaths per minute, B. Oxygen saturation LESS THAN 90%, C. Sedation score of 6  ondansetron Injectable 4 milliGRAM(s) IV Push every 6 hours PRN Nausea      IMAGING:

## 2022-11-10 NOTE — OCCUPATIONAL THERAPY INITIAL EVALUATION ADULT - RANGE OF MOTION EXAMINATION, UPPER EXTREMITY
Left UE Active ROM was WFL (within functional limits)/Left UE Passive ROM was WFL  (within functional limits)/bilateral UE Passive ROM was WFL  (within functional limits)

## 2022-11-10 NOTE — OCCUPATIONAL THERAPY INITIAL EVALUATION ADULT - RUE MMT, REHAB EVAL
shoulder 2-/5, elbow 3/5, wrist extension 3-/5, wrist flexion 4/5, digit flexion 3/5, digit extension 2-/5. +wrist flexor tightness with tenodesis

## 2022-11-10 NOTE — OCCUPATIONAL THERAPY INITIAL EVALUATION ADULT - RANGE OF MOTION EXAMINATION, LOWER EXTREMITY
Results are in her chart and they are normal bilateral LE Active ROM was WFL  (within functional limits)/bilateral LE Passive ROM was WFL  (within functional limits)

## 2022-11-10 NOTE — PROGRESS NOTE ADULT - SUBJECTIVE AND OBJECTIVE BOX
57M transferred from [facility] for evaluation and treatment of cervical myelopathy. He reports three years of right upper extremity weakness and gait unsteadiness after a fall onto back.  He denies seeing spine surgeon in past. No acute changes in RUE weakness or gait unsteadiness. Also reports new onset numbness L sole of foot. Denies pain/injury elsewhere. Denies tingling/paresthesia. Denies bowel/bladder incontinence. No saddle anesthesia. Denies fevers/chills. No other complaints at this time. Patient is scheduled for a PSF of C2-T2. Patient seen now resting comfortably. Patient seen post op in the PACU. Patient currently on pressors to keep perfusion pressure elevated. Patient states pain has been well controlled. Has started participating with physical therapy       MEDICATIONS  (STANDING):  acetaminophen     Tablet .. 975 milliGRAM(s) Oral every 8 hours  chlorhexidine 2% Cloths 1 Application(s) Topical <User Schedule>  cyclobenzaprine 10 milliGRAM(s) Oral every 8 hours  dexAMETHasone     Tablet 3 milliGRAM(s) Oral every 6 hours  gabapentin 100 milliGRAM(s) Oral three times a day  influenza   Vaccine 0.5 milliLiter(s) IntraMuscular once  insulin lispro (ADMELOG) corrective regimen sliding scale   SubCutaneous every 6 hours  pantoprazole    Tablet 40 milliGRAM(s) Oral before breakfast  polyethylene glycol 3350 17 Gram(s) Oral every 12 hours  senna 2 Tablet(s) Oral at bedtime    MEDICATIONS  (PRN):  benzocaine 15 mG/menthol 3.6 mG Lozenge 1 Lozenge Oral every 2 hours PRN Sore Throat  bisacodyl 5 milliGRAM(s) Oral every 12 hours PRN Constipation  diphenhydrAMINE 25 milliGRAM(s) Oral every 4 hours PRN Pruritus  magnesium hydroxide Suspension 30 milliLiter(s) Oral every 12 hours PRN Constipation  naloxone Injectable 0.1 milliGRAM(s) IV Push every 3 minutes PRN For ANY of the following changes in patient status:  A. RR LESS THAN 10 breaths per minute, B. Oxygen saturation LESS THAN 90%, C. Sedation score of 6  ondansetron Injectable 4 milliGRAM(s) IV Push every 6 hours PRN Nausea          VITALS:   T(C): 36.6 (11-10-22 @ 11:00), Max: 36.9 (11-09-22 @ 16:00)  HR: 98 (11-10-22 @ 14:00) (79 - 130)  BP: 122/71 (11-10-22 @ 14:00) (79/49 - 174/79)  RR: 17 (11-10-22 @ 14:00) (11 - 30)  SpO2: 98% (11-10-22 @ 14:00) (95% - 100%)  Wt(kg): --    PHYSICAL EXAM:  GENERAL: NAD, well-groomed, well-developed  HEAD:  Atraumatic, Normocephalic  EYES: EOMI, PERRLA, conjunctiva and sclera clear  ENMT: No tonsillar erythema, exudates, or enlargement; Moist mucous membranes, Good dentition, No lesions  NECK: Supple, No JVD, Normal thyroid  NERVOUS SYSTEM:  Alert & Oriented X3, right sided weakness   CHEST/LUNG: Clear to percussion bilaterally; No rales, rhonchi, wheezing, or rubs  HEART: Regular rate and rhythm; No murmurs, rubs, or gallops  ABDOMEN: Soft, Nontender, Nondistended; Bowel sounds present  EXTREMITIES:  2+ Peripheral Pulses, No clubbing, cyanosis, or edema  LYMPH: No lymphadenopathy noted  SKIN: No rashes or lesions    LABS:  ABG - ( 09 Nov 2022 21:59 )  pH, Arterial: 7.40  pH, Blood: x     /  pCO2: 44    /  pO2: 65    / HCO3: 27    / Base Excess: 2.1   /  SaO2: 95.3                  CBC Full  -  ( 10 Nov 2022 03:43 )  WBC Count : 17.91 K/uL  RBC Count : 3.49 M/uL  Hemoglobin : 9.8 g/dL  Hematocrit : 30.1 %  Platelet Count - Automated : 238 K/uL  Mean Cell Volume : 86.2 fl  Mean Cell Hemoglobin : 28.1 pg  Mean Cell Hemoglobin Concentration : 32.6 gm/dL  Auto Neutrophil # : x  Auto Lymphocyte # : x  Auto Monocyte # : x  Auto Eosinophil # : x  Auto Basophil # : x  Auto Neutrophil % : x  Auto Lymphocyte % : x  Auto Monocyte % : x  Auto Eosinophil % : x  Auto Basophil % : x    11-10    140  |  105  |  18  ----------------------------<  191<H>  3.9   |  27  |  0.90    Ca    8.1<L>      10 Nov 2022 03:43  Phos  2.7     11-10  Mg     1.8     11-10    TPro  5.9<L>  /  Alb  3.7  /  TBili  0.3  /  DBili  x   /  AST  53<H>  /  ALT  52<H>  /  AlkPhos  48  11-09    LIVER FUNCTIONS - ( 09 Nov 2022 14:39 )  Alb: 3.7 g/dL / Pro: 5.9 g/dL / ALK PHOS: 48 U/L / ALT: 52 U/L / AST: 53 U/L / GGT: x               CAPILLARY BLOOD GLUCOSE      POCT Blood Glucose.: 180 mg/dL (10 Nov 2022 11:53)      RADIOLOGY & ADDITIONAL TESTS:

## 2022-11-10 NOTE — OCCUPATIONAL THERAPY INITIAL EVALUATION ADULT - PERTINENT HX OF CURRENT PROBLEM, REHAB EVAL
57M transferred from [facility] for evaluation and treatment of cervical myelopathy. He reports three years of right upper extremity weakness and gait unsteadiness after a fall onto back. He denies seeing spine surgeon in past. No acute changes in RUE weakness or gait unsteadiness. Also reports new onset numbness L sole of foot. Denies pain/injury elsewhere. Denies tingling/paresthesia. Denies bowel/bladder incontinence. No saddle anesthesia. Now s/p C2-T2 PSF, C3-6 lami on 11/8/22.

## 2022-11-10 NOTE — OCCUPATIONAL THERAPY INITIAL EVALUATION ADULT - GENERAL OBSERVATIONS, REHAB EVAL
Pt presents seated in chair in NAD, +c-collar, +IVL, +tele, +pulse ox, +BP cuff, +CIELO drain, +hemovac, +wynn.
Spine appears normal, range of motion is not limited, no muscle or joint tenderness

## 2022-11-10 NOTE — OCCUPATIONAL THERAPY INITIAL EVALUATION ADULT - DIAGNOSIS, OT EVAL
Pt p/w impaired balance, strength, endurance, AROM impacting independence with ADLs and functional mobility.

## 2022-11-10 NOTE — PROGRESS NOTE ADULT - SUBJECTIVE AND OBJECTIVE BOX
Pain Management Attending Addendum    SUBJECTIVE: Patient doing well with IV PCA    Therapy:    [X] IV PCA         [ ] PRN Analgesics    OBJECTIVE:   [ ] Pain appropriately controlled    [X] Other: Pain poorly controlled    Side Effects:  [X] None	             [ ] Nausea              [ ] Pruritis                	[ ] Other:    ASSESSMENT/PLAN: Continue current therapy    Comments: Pain poorly controlled on current regimen, but patient is not using PCA appropriately. Pt re-educated as to appropriate use of PCA. If pt continues to under-utilize PCA, would recommend transition to alternative pain regimen.

## 2022-11-10 NOTE — PROGRESS NOTE ADULT - SUBJECTIVE AND OBJECTIVE BOX
Day 2 of Anesthesia Pain Management Service    SUBJECTIVE: I'm having pain    Pain Scale Score:	[X] Refer to charted pain scores    THERAPY:    [ ] IV PCA Morphine		[ ] 5 mg/mL	[ ] 1 mg/mL  [X] IV PCA Hydromorphone	[ ] 5 mg/mL	[X] 1 mg/mL  [ ] IV PCA Fentanyl		[ ] 50 micrograms/mL    Demand dose: 0.2 mg     Lockout: 6 minutes   Continuous Rate: 0 mg/hr  4 Hour Limit: 4 mg    MEDICATIONS  (STANDING):  acetaminophen     Tablet .. 975 milliGRAM(s) Oral every 8 hours  chlorhexidine 2% Cloths 1 Application(s) Topical <User Schedule>  cyclobenzaprine 10 milliGRAM(s) Oral every 8 hours  dexAMETHasone     Tablet 4 milliGRAM(s) Oral every 6 hours  dexAMETHasone     Tablet 3 milliGRAM(s) Oral every 6 hours  gabapentin 100 milliGRAM(s) Oral three times a day  HYDROmorphone PCA (1 mG/mL) 30 milliLiter(s) PCA Continuous PCA Continuous  influenza   Vaccine 0.5 milliLiter(s) IntraMuscular once  pantoprazole    Tablet 40 milliGRAM(s) Oral before breakfast  phenylephrine    Infusion 0.5 MICROgram(s)/kG/Min (11.8 mL/Hr) IV Continuous <Continuous>  polyethylene glycol 3350 17 Gram(s) Oral daily  senna 2 Tablet(s) Oral at bedtime  sodium chloride 0.9%. 1000 milliLiter(s) (20 mL/Hr) IV Continuous <Continuous>    MEDICATIONS  (PRN):  benzocaine 15 mG/menthol 3.6 mG Lozenge 1 Lozenge Oral every 2 hours PRN Sore Throat  bisacodyl 5 milliGRAM(s) Oral every 12 hours PRN Constipation  diphenhydrAMINE 25 milliGRAM(s) Oral every 4 hours PRN Pruritus  HYDROmorphone PCA (1 mG/mL) Rescue Clinician Bolus 0.5 milliGRAM(s) IV Push every 15 minutes PRN for Pain Scale GREATER THAN 6  magnesium hydroxide Suspension 30 milliLiter(s) Oral every 12 hours PRN Constipation  naloxone Injectable 0.1 milliGRAM(s) IV Push every 3 minutes PRN For ANY of the following changes in patient status:  A. RR LESS THAN 10 breaths per minute, B. Oxygen saturation LESS THAN 90%, C. Sedation score of 6  ondansetron Injectable 4 milliGRAM(s) IV Push every 6 hours PRN Nausea      OBJECTIVE:    Sedation Score:	[ X] Alert 	[ ] Drowsy 	[ ] Arousable	[ ] Asleep	[ ] Unresponsive    Side Effects:	[X ] None	[ ] Nausea	[ ] Vomiting	[ ] Pruritus  		[ ] Other:    Vital Signs Last 24 Hrs  T(C): 36.2 (10 Nov 2022 07:00), Max: 36.9 (09 Nov 2022 16:00)  T(F): 97.2 (10 Nov 2022 07:00), Max: 98.4 (09 Nov 2022 16:00)  HR: 103 (10 Nov 2022 09:00) (79 - 130)  BP: 105/60 (10 Nov 2022 09:00) (79/49 - 174/79)  BP(mean): 77 (10 Nov 2022 09:00) (59 - 113)  RR: 18 (10 Nov 2022 09:00) (12 - 30)  SpO2: 97% (10 Nov 2022 09:00) (95% - 100%)    Parameters below as of 10 Nov 2022 07:00  Patient On (Oxygen Delivery Method): room air        ASSESSMENT/ PLAN    Therapy to  be:               [X] Continued   [ ] Discontinued   [ ] Changed to PRN Analgesics    Documentation and Verification of current medications:   [X] Done	[ ] Not done, not eligible    Comments: Endorsing pain while on PCA. Used 2doses / 8hr. Reeducated to use. If pain not controlled recommend D\C PCA and transition to prn analgesics

## 2022-11-11 LAB
ANION GAP SERPL CALC-SCNC: 8 MMOL/L — SIGNIFICANT CHANGE UP (ref 5–17)
BUN SERPL-MCNC: 14 MG/DL — SIGNIFICANT CHANGE UP (ref 7–23)
CALCIUM SERPL-MCNC: 8.4 MG/DL — SIGNIFICANT CHANGE UP (ref 8.4–10.5)
CHLORIDE SERPL-SCNC: 103 MMOL/L — SIGNIFICANT CHANGE UP (ref 96–108)
CO2 SERPL-SCNC: 27 MMOL/L — SIGNIFICANT CHANGE UP (ref 22–31)
CREAT SERPL-MCNC: 0.73 MG/DL — SIGNIFICANT CHANGE UP (ref 0.5–1.3)
EGFR: 106 ML/MIN/1.73M2 — SIGNIFICANT CHANGE UP
GLUCOSE BLDC GLUCOMTR-MCNC: 152 MG/DL — HIGH (ref 70–99)
GLUCOSE BLDC GLUCOMTR-MCNC: 165 MG/DL — HIGH (ref 70–99)
GLUCOSE BLDC GLUCOMTR-MCNC: 209 MG/DL — HIGH (ref 70–99)
GLUCOSE SERPL-MCNC: 163 MG/DL — HIGH (ref 70–99)
HCT VFR BLD CALC: 30.7 % — LOW (ref 39–50)
HGB BLD-MCNC: 10.3 G/DL — LOW (ref 13–17)
MAGNESIUM SERPL-MCNC: 2 MG/DL — SIGNIFICANT CHANGE UP (ref 1.6–2.6)
MCHC RBC-ENTMCNC: 29.2 PG — SIGNIFICANT CHANGE UP (ref 27–34)
MCHC RBC-ENTMCNC: 33.6 GM/DL — SIGNIFICANT CHANGE UP (ref 32–36)
MCV RBC AUTO: 87 FL — SIGNIFICANT CHANGE UP (ref 80–100)
NRBC # BLD: 0 /100 WBCS — SIGNIFICANT CHANGE UP (ref 0–0)
PHOSPHATE SERPL-MCNC: 2.6 MG/DL — SIGNIFICANT CHANGE UP (ref 2.5–4.5)
PLATELET # BLD AUTO: 230 K/UL — SIGNIFICANT CHANGE UP (ref 150–400)
POTASSIUM SERPL-MCNC: 4.3 MMOL/L — SIGNIFICANT CHANGE UP (ref 3.5–5.3)
POTASSIUM SERPL-SCNC: 4.3 MMOL/L — SIGNIFICANT CHANGE UP (ref 3.5–5.3)
RBC # BLD: 3.53 M/UL — LOW (ref 4.2–5.8)
RBC # FLD: 15.3 % — HIGH (ref 10.3–14.5)
SODIUM SERPL-SCNC: 138 MMOL/L — SIGNIFICANT CHANGE UP (ref 135–145)
WBC # BLD: 14.09 K/UL — HIGH (ref 3.8–10.5)
WBC # FLD AUTO: 14.09 K/UL — HIGH (ref 3.8–10.5)

## 2022-11-11 RX ORDER — TRAMADOL HYDROCHLORIDE 50 MG/1
50 TABLET ORAL EVERY 8 HOURS
Refills: 0 | Status: DISCONTINUED | OUTPATIENT
Start: 2022-11-11 | End: 2022-11-12

## 2022-11-11 RX ADMIN — Medication 3 MILLIGRAM(S): at 11:50

## 2022-11-11 RX ADMIN — GABAPENTIN 100 MILLIGRAM(S): 400 CAPSULE ORAL at 14:39

## 2022-11-11 RX ADMIN — Medication 975 MILLIGRAM(S): at 07:28

## 2022-11-11 RX ADMIN — GABAPENTIN 100 MILLIGRAM(S): 400 CAPSULE ORAL at 06:09

## 2022-11-11 RX ADMIN — Medication 975 MILLIGRAM(S): at 14:38

## 2022-11-11 RX ADMIN — TRAMADOL HYDROCHLORIDE 50 MILLIGRAM(S): 50 TABLET ORAL at 06:39

## 2022-11-11 RX ADMIN — Medication 975 MILLIGRAM(S): at 22:01

## 2022-11-11 RX ADMIN — Medication 975 MILLIGRAM(S): at 22:02

## 2022-11-11 RX ADMIN — CYCLOBENZAPRINE HYDROCHLORIDE 10 MILLIGRAM(S): 10 TABLET, FILM COATED ORAL at 11:49

## 2022-11-11 RX ADMIN — TRAMADOL HYDROCHLORIDE 50 MILLIGRAM(S): 50 TABLET ORAL at 06:09

## 2022-11-11 RX ADMIN — CHLORHEXIDINE GLUCONATE 1 APPLICATION(S): 213 SOLUTION TOPICAL at 06:31

## 2022-11-11 RX ADMIN — Medication 3 MILLIGRAM(S): at 06:08

## 2022-11-11 RX ADMIN — OXYCODONE HYDROCHLORIDE 10 MILLIGRAM(S): 5 TABLET ORAL at 18:02

## 2022-11-11 RX ADMIN — SENNA PLUS 2 TABLET(S): 8.6 TABLET ORAL at 22:02

## 2022-11-11 RX ADMIN — PANTOPRAZOLE SODIUM 40 MILLIGRAM(S): 20 TABLET, DELAYED RELEASE ORAL at 06:09

## 2022-11-11 RX ADMIN — GABAPENTIN 100 MILLIGRAM(S): 400 CAPSULE ORAL at 22:02

## 2022-11-11 RX ADMIN — POLYETHYLENE GLYCOL 3350 17 GRAM(S): 17 POWDER, FOR SOLUTION ORAL at 18:00

## 2022-11-11 RX ADMIN — POLYETHYLENE GLYCOL 3350 17 GRAM(S): 17 POWDER, FOR SOLUTION ORAL at 06:14

## 2022-11-11 RX ADMIN — Medication 2 MILLIGRAM(S): at 18:00

## 2022-11-11 RX ADMIN — Medication 2 MILLIGRAM(S): at 22:03

## 2022-11-11 RX ADMIN — CYCLOBENZAPRINE HYDROCHLORIDE 10 MILLIGRAM(S): 10 TABLET, FILM COATED ORAL at 01:08

## 2022-11-11 RX ADMIN — CYCLOBENZAPRINE HYDROCHLORIDE 10 MILLIGRAM(S): 10 TABLET, FILM COATED ORAL at 18:00

## 2022-11-11 RX ADMIN — OXYCODONE HYDROCHLORIDE 10 MILLIGRAM(S): 5 TABLET ORAL at 18:56

## 2022-11-11 NOTE — CONSULT NOTE ADULT - ASSESSMENT
A/P:  57y.o with severe scoliosis s/p C2-T2 PSF, C3-C6 laminectomy by ortho with muscle flap reconstruction.  - Dressing to remain in place  - Care per primary team  - Will Follow    Thank You  Marian Christianson MD  Plastic Surgery  
56 yo male with a hx of cervical radiculopathy presents for Posterior surgical fusion of C2-T2
ASSESSMENT: 57yMale with PMHx cervical myelopathy s/p C2-T2 PSF, C3-6 Lami on 11/8. Admitted for SICU for dylan gtt, MAP goal >90    PLAN:   Neurologic:  AOx4  Multimodal pain management with PCA, Tylenol, Oxycodone, Tramadol, Gabapentin, Flexeril    Respiratory:  Saturation stable on RA    Cardiovascular:  Dylan gtt to maintain MAP >90  Titrate off as able  S/p 1L LR bolus total for elevated LA    Gastrointestinal/Nutrition:  Zofran PRN  Regular diet  Bowel regimen with Miralax, Senna, Dulcolax  Continue PPI    Genitourinary/Renal:  LR @120 cc/hr  Monitor UO    Hematologic:   No DVT ppx for now    Infectious Disease:  Ancef x 2 doses    Endocrine:  Decadron taper    Disposition: SICU, patient will need to remain in PACU until bed becomes available.

## 2022-11-11 NOTE — PROGRESS NOTE ADULT - SUBJECTIVE AND OBJECTIVE BOX
57M transferred from [facility] for evaluation and treatment of cervical myelopathy. He reports three years of right upper extremity weakness and gait unsteadiness after a fall onto back.  He denies seeing spine surgeon in past. No acute changes in RUE weakness or gait unsteadiness. Also reports new onset numbness L sole of foot. Denies pain/injury elsewhere. Denies tingling/paresthesia. Denies bowel/bladder incontinence. No saddle anesthesia. Denies fevers/chills. No other complaints at this time. Patient is scheduled for a PSF of C2-T2. Patient seen now resting comfortably. Patient seen post op in the PACU. Patient currently on pressors to keep perfusion pressure elevated. Patient states pain has been well controlled. Has started participating with physical therapy. Patient states pain has been well managed    MEDICATIONS  (STANDING):  acetaminophen     Tablet .. 975 milliGRAM(s) Oral every 8 hours  chlorhexidine 2% Cloths 1 Application(s) Topical <User Schedule>  cyclobenzaprine 10 milliGRAM(s) Oral every 8 hours  dexAMETHasone     Tablet 2 milliGRAM(s) Oral every 6 hours  gabapentin 100 milliGRAM(s) Oral three times a day  influenza   Vaccine 0.5 milliLiter(s) IntraMuscular once  insulin lispro (ADMELOG) corrective regimen sliding scale   SubCutaneous every 6 hours  pantoprazole    Tablet 40 milliGRAM(s) Oral before breakfast  polyethylene glycol 3350 17 Gram(s) Oral every 12 hours  senna 2 Tablet(s) Oral at bedtime    MEDICATIONS  (PRN):  benzocaine 15 mG/menthol 3.6 mG Lozenge 1 Lozenge Oral every 2 hours PRN Sore Throat  bisacodyl 5 milliGRAM(s) Oral every 12 hours PRN Constipation  diphenhydrAMINE 25 milliGRAM(s) Oral every 4 hours PRN Pruritus  HYDROmorphone  Injectable 0.5 milliGRAM(s) IV Push every 4 hours PRN Severe Pain (7 - 10)  magnesium hydroxide Suspension 30 milliLiter(s) Oral every 12 hours PRN Constipation  naloxone Injectable 0.1 milliGRAM(s) IV Push every 3 minutes PRN For ANY of the following changes in patient status:  A. RR LESS THAN 10 breaths per minute, B. Oxygen saturation LESS THAN 90%, C. Sedation score of 6  ondansetron Injectable 4 milliGRAM(s) IV Push every 6 hours PRN Nausea  oxyCODONE    IR 5 milliGRAM(s) Oral every 4 hours PRN Moderate Pain (4 - 6)  oxyCODONE    IR 10 milliGRAM(s) Oral every 4 hours PRN Severe Pain (7 - 10)  traMADol 50 milliGRAM(s) Oral every 8 hours PRN Mild Pain (1 - 3)          VITALS:   T(C): 36.7 (11-11-22 @ 14:14), Max: 36.7 (11-10-22 @ 16:18)  HR: 106 (11-11-22 @ 14:14) (81 - 110)  BP: 113/68 (11-11-22 @ 14:14) (111/61 - 145/89)  RR: 18 (11-11-22 @ 14:14) (18 - 18)  SpO2: 98% (11-11-22 @ 14:14) (97% - 99%)  Wt(kg): --    PHYSICAL EXAM:  GENERAL: NAD, well-groomed, well-developed  HEAD:  Atraumatic, Normocephalic  EYES: EOMI, PERRLA, conjunctiva and sclera clear  ENMT: No tonsillar erythema, exudates, or enlargement; Moist mucous membranes, Good dentition, No lesions  NECK: Supple, No JVD, Normal thyroid  NERVOUS SYSTEM:  Alert & Oriented X3, right sided weakness   CHEST/LUNG: Clear to percussion bilaterally; No rales, rhonchi, wheezing, or rubs  HEART: Regular rate and rhythm; No murmurs, rubs, or gallops  ABDOMEN: Soft, Nontender, Nondistended; Bowel sounds present  EXTREMITIES:  2+ Peripheral Pulses, No clubbing, cyanosis, or edema  LYMPH: No lymphadenopathy noted  SKIN: No rashes or lesions    LABS:  ABG - ( 09 Nov 2022 21:59 )  pH, Arterial: 7.40  pH, Blood: x     /  pCO2: 44    /  pO2: 65    / HCO3: 27    / Base Excess: 2.1   /  SaO2: 95.3                  CBC Full  -  ( 11 Nov 2022 06:16 )  WBC Count : 14.09 K/uL  RBC Count : 3.53 M/uL  Hemoglobin : 10.3 g/dL  Hematocrit : 30.7 %  Platelet Count - Automated : 230 K/uL  Mean Cell Volume : 87.0 fl  Mean Cell Hemoglobin : 29.2 pg  Mean Cell Hemoglobin Concentration : 33.6 gm/dL  Auto Neutrophil # : x  Auto Lymphocyte # : x  Auto Monocyte # : x  Auto Eosinophil # : x  Auto Basophil # : x  Auto Neutrophil % : x  Auto Lymphocyte % : x  Auto Monocyte % : x  Auto Eosinophil % : x  Auto Basophil % : x    11-11    138  |  103  |  14  ----------------------------<  163<H>  4.3   |  27  |  0.73    Ca    8.4      11 Nov 2022 06:12  Phos  2.6     11-11  Mg     2.0     11-11            CAPILLARY BLOOD GLUCOSE      POCT Blood Glucose.: 165 mg/dL (11 Nov 2022 11:42)  POCT Blood Glucose.: 152 mg/dL (11 Nov 2022 05:38)  POCT Blood Glucose.: 204 mg/dL (10 Nov 2022 23:26)  POCT Blood Glucose.: 180 mg/dL (10 Nov 2022 21:11)      RADIOLOGY & ADDITIONAL TESTS:

## 2022-11-11 NOTE — PROGRESS NOTE ADULT - SUBJECTIVE AND OBJECTIVE BOX
POD3 s/p C2-T2 PSF, C3-C6 Laminectomy    Patient seen and examined. Doing well. Pain controlled. No acute overnight complaints or events.    Vital Signs Last 24 Hrs  T(C): 36.6 (11 Nov 2022 04:55), Max: 36.9 (10 Nov 2022 15:00)  T(F): 97.9 (11 Nov 2022 04:55), Max: 98.4 (10 Nov 2022 15:00)  HR: 96 (11 Nov 2022 04:55) (79 - 123)  BP: 115/69 (11 Nov 2022 04:55) (92/75 - 145/89)  BP(mean): 108 (10 Nov 2022 16:55) (71 - 108)  RR: 18 (11 Nov 2022 04:55) (11 - 26)  SpO2: 99% (11 Nov 2022 04:55) (96% - 100%)    Parameters below as of 11 Nov 2022 04:55  Patient On (Oxygen Delivery Method): room air                          10.3   14.09 )-----------( 230      ( 11 Nov 2022 06:16 )             30.7         Gen: NAD  PE  General: Awake alert no acute distress   Spine:  C collar in place, dressing CDI  HV and CIELO drains in place with SS output     Motor:                   C5                C6              C7               C8           T1   R            5/5                5/5            5/5             4/5          4/5  L             4/5               4/5            4/5             4/5          4/5                L2             L3             L4               L5            S1  R         5/5           5/5          5/5             5/5           5/5  L          5/5          5/5           5/5             5/5           5/5    Sensory:            C5         C6         C7      C8       T1        (0=absent, 1=impaired, 2=normal, NT=not testable)  R         2            2           2        2         2  L          2            2           2        2         2               L2          L3         L4      L5       S1         (0=absent, 1=impaired, 2=normal, NT=not testable)  R         2            2            2        2        2  L          2            2           2        2         2        57y Male s/p C2-T2 PSF, C3-C6 lami, recovering appropriately    Medical comanagement appreciated  - Pain control, PCA discontinued yesterday  - FU AM labs  -Decadron Taper  - Downgraded from SICU yesterday  - WBAT  - PT/OT/OOB  - Incentive spirometry  - Monitor HMV output  - DVT ppx: SCDs  - C Collar at all times  - Dispo planning per PT recs  -Will discuss with attending Dr. Casillas and advise if any changes to plan

## 2022-11-11 NOTE — CONSULT NOTE ADULT - SUBJECTIVE AND OBJECTIVE BOX
57M transferred from [facility] for evaluation and treatment of cervical myelopathy. He reports three years of right upper extremity weakness and gait unsteadiness after a fall onto back.  He denies seeing spine surgeon in past. No acute changes in RUE weakness or gait unsteadiness. Also reports new onset numbness L sole of foot. Denies pain/injury elsewhere. Denies tingling/paresthesia. Denies bowel/bladder incontinence. No saddle anesthesia. Denies fevers/chills. No other complaints at this time. Patient is scheduled for a PSF of C2-T2. Patient seen now resting comfortably.      PAST MEDICAL & SURGICAL HISTORY:  No significant PMH or PSH      MEDICATIONS  (STANDING):  ascorbic acid 500 milliGRAM(s) Oral two times a day  folic acid 1 milliGRAM(s) Oral daily  heparin   Injectable 5000 Unit(s) SubCutaneous every 8 hours  multivitamin 1 Tablet(s) Oral daily  pantoprazole    Tablet 40 milliGRAM(s) Oral before breakfast    MEDICATIONS  (PRN):  acetaminophen     Tablet .. 650 milliGRAM(s) Oral every 6 hours PRN Temp greater or equal to 38C (100.4F), Mild Pain (1 - 3)  ondansetron Injectable 4 milliGRAM(s) IV Push every 6 hours PRN Nausea and/or Vomiting  oxyCODONE    IR 10 milliGRAM(s) Oral every 4 hours PRN Severe Pain (7 - 10)  oxyCODONE    IR 5 milliGRAM(s) Oral every 4 hours PRN Moderate Pain (4 - 6)    Social Hx:  Tobacco: former smoker  ETOH: occasionally  Drugs: Neg    ROS  CONSTITUTIONAL: No weakness, fevers or chills  EYES/ENT: No visual changes;  No vertigo or throat pain   NECK: No pain or stiffness  RESPIRATORY: No cough, wheezing, hemoptysis; No shortness of breath  CARDIOVASCULAR: No chest pain or palpitations  GASTROINTESTINAL: No abdominal or epigastric pain. No nausea, vomiting, or hematemesis; No diarrhea or constipation. No melena or hematochezia.  GENITOURINARY: No dysuria, frequency or hematuria  NEUROLOGICAL: right sided wekaness and left sided numbness   SKIN: No itching, burning, rashes, or lesions   All other review of systems is negative unless indicated above.    INTERVAL HPI/OVERNIGHT EVENTS:  T(C): 36.9 (11-06-22 @ 15:43), Max: 36.9 (11-06-22 @ 15:43)  HR: 85 (11-06-22 @ 15:43) (85 - 85)  BP: 105/65 (11-06-22 @ 15:43) (105/65 - 105/65)  RR: 18 (11-06-22 @ 15:43) (18 - 18)  SpO2: 100% (11-06-22 @ 15:43) (100% - 100%)  Wt(kg): --  I&O's Summary      PHYSICAL EXAM:  GENERAL: NAD, well-groomed, well-developed  HEAD:  Atraumatic, Normocephalic  EYES: EOMI, PERRLA, conjunctiva and sclera clear  ENMT: No tonsillar erythema, exudates, or enlargement; Moist mucous membranes, Good dentition, No lesions  NECK: Supple, No JVD, Normal thyroid  NERVOUS SYSTEM:  Alert & Oriented X3, Good concentration; Motor Strength 5/5 B/L upper and lower extremities; DTRs 2+ intact and symmetric  CHEST/LUNG: Clear to percussion bilaterally; No rales, rhonchi, wheezing, or rubs  HEART: Regular rate and rhythm; No murmurs, rubs, or gallops  ABDOMEN: Soft, Nontender, Nondistended; Bowel sounds present  EXTREMITIES:  2+ Peripheral Pulses, No clubbing, cyanosis, or edema  LYMPH: No lymphadenopathy noted  SKIN: No rashes or lesions        LABS:                        14.3   7.19  )-----------( 256      ( 06 Nov 2022 18:06 )             43.6     11-06    139  |  100  |  22  ----------------------------<  146<H>  3.8   |  28  |  0.89    Ca    9.2      06 Nov 2022 18:06      PT/INR - ( 06 Nov 2022 18:06 )   PT: 11.9 sec;   INR: 1.03 ratio         PTT - ( 06 Nov 2022 18:06 )  PTT:28.5 sec    
HISTORY OF PRESENT ILLNESS:  PHILOMENA HUGO is a 57y Male 57M transferred from [facility] for evaluation and treatment of cervical myelopathy. He reports three years of right upper extremity weakness and gait unsteadiness after a fall onto back.  He denies seeing spine surgeon in past. No acute changes in RUE weakness or gait unsteadiness. Also reports new onset numbness L sole of foot. Denies pain/injury elsewhere. Denies tingling/paresthesia. Denies bowel/bladder incontinence. No saddle anesthesia. Patient s/p C2-T2 PSF, C3-6 Lami on 11/8. SICU consulted for management of nisha gtt, MAP goal >90.      PAST MEDICAL HISTORY: None    PAST SURGICAL HISTORY: None    FAMILY HISTORY:     SOCIAL HISTORY:  Tobacco: former smoker  ETOH: occasionally  Drugs: Neg    CODE STATUS: Full    HOME MEDICATIONS:  None    ALLERGIES: No Known Allergies      VITAL SIGNS:  ICU Vital Signs Last 24 Hrs  T(C): 36.5 (09 Nov 2022 00:00), Max: 36.5 (09 Nov 2022 00:00)  T(F): 97.7 (09 Nov 2022 00:00), Max: 97.7 (09 Nov 2022 00:00)  HR: 101 (09 Nov 2022 00:30) (58 - 123)  BP: 121/69 (09 Nov 2022 00:00) (89/54 - 150/80)  BP(mean): 88 (09 Nov 2022 00:00) (66 - 118)  ABP: 145/75 (09 Nov 2022 00:30) (79/42 - 179/86)  ABP(mean): 104 (09 Nov 2022 00:30) (36 - 121)  RR: 14 (09 Nov 2022 00:30) (14 - 18)  SpO2: 100% (09 Nov 2022 00:30) (99% - 100%)    O2 Parameters below as of 09 Nov 2022 00:00  Patient On (Oxygen Delivery Method): room air            NEURO  Exam: AOx4. Able to move all extremities. Some R sided weakness consistent with prior. No new numbness, tingling. Incisional pain.  Meds:acetaminophen     Tablet .. 975 milliGRAM(s) Oral every 8 hours  cyclobenzaprine 10 milliGRAM(s) Oral every 8 hours  diphenhydrAMINE 25 milliGRAM(s) Oral every 4 hours PRN Pruritus  gabapentin 100 milliGRAM(s) Oral three times a day  HYDROmorphone PCA (1 mG/mL) 30 milliLiter(s) PCA Continuous PCA Continuous  HYDROmorphone PCA (1 mG/mL) Rescue Clinician Bolus 0.5 milliGRAM(s) IV Push every 15 minutes PRN for Pain Scale GREATER THAN 6  ondansetron Injectable 4 milliGRAM(s) IV Push every 6 hours PRN Nausea  ondansetron Injectable 4 milliGRAM(s) IV Push once PRN Nausea and/or Vomiting  oxyCODONE    IR 5 milliGRAM(s) Oral every 4 hours PRN Moderate Pain (4 - 6)  oxyCODONE    IR 10 milliGRAM(s) Oral every 4 hours PRN Severe Pain (7 - 10)  traMADol 50 milliGRAM(s) Oral every 6 hours PRN Mild Pain (1 - 3)      RESPIRATORY  Mechanical Ventilation:   ABG - ( 08 Nov 2022 22:39 )  pH: x     /  pCO2: x     /  pO2: x     / HCO3: x     / Base Excess: x     /  SaO2: x       Lactate: 3.4              Exam: Clear to auscultationb/l  Meds:    CARDIOVASCULAR    Exam: S1S2. No murmurs, rubs, gallops appreciated.   Cardiac Rhythm: NSR  Meds:phenylephrine    Infusion 0.5 MICROgram(s)/kG/Min IV Continuous <Continuous>      GI/NUTRITION  Exam: Soft, non-distended, non-tender  Diet: Regular  Meds:bisacodyl 5 milliGRAM(s) Oral every 12 hours PRN Constipation  magnesium hydroxide Suspension 30 milliLiter(s) Oral every 12 hours PRN Constipation  pantoprazole    Tablet 40 milliGRAM(s) Oral before breakfast  polyethylene glycol 3350 17 Gram(s) Oral daily  senna 2 Tablet(s) Oral at bedtime      GENITOURINARY/RENAL  Meds:lactated ringers. 1000 milliLiter(s) IV Continuous <Continuous>      11-07 @ 07:01  -  11-08 @ 07:00  --------------------------------------------------------  IN:    Lactated Ringers: 525 mL    Oral Fluid: 400 mL  Total IN: 925 mL    OUT:    Voided (mL): 1800 mL  Total OUT: 1800 mL    Total NET: -875 mL      11-08 @ 07:01  -  11-09 @ 01:09  --------------------------------------------------------  IN:    IV PiggyBack: 100 mL    IV PiggyBack: 150 mL    Lactated Ringers: 840 mL    Lactated Ringers Bolus: 1000 mL    Oral Fluid: 480 mL    Phenylephrine: 69.8 mL  Total IN: 2639.8 mL    OUT:    Accordian (mL): 110 mL    Bulb (mL): 70 mL    Indwelling Catheter - Urethral (mL): 1750 mL  Total OUT: 1930 mL    Total NET: 709.8 mL          11-08    145  |  110<H>  |  10  ----------------------------<  185<H>  4.0   |  22  |  0.76    Ca    7.7<L>      08 Nov 2022 17:27      [ ] Jimenez catheter, indication: urine output monitoring in critically ill patient    HEMATOLOGIC  [ ] VTE Prophylaxis:                          11.7   12.32 )-----------( 267      ( 08 Nov 2022 17:27 )             34.7     PT/INR - ( 08 Nov 2022 05:21 )   PT: 12.0 sec;   INR: 1.03 ratio         PTT - ( 08 Nov 2022 05:21 )  PTT:28.4 sec  Transfusion: [ ] PRBC	[ ] Platelets	[ ] FFP	[ ] Cryoprecipitate      INFECTIOUS DISEASES  Meds:ceFAZolin   IVPB 2000 milliGRAM(s) IV Intermittent every 8 hours    RECENT CULTURES:      ENDOCRINE  Meds:dexAMETHasone     Tablet 4 milliGRAM(s) Oral every 6 hours  dexAMETHasone  IVPB 8 milliGRAM(s) IV Intermittent every 8 hours    CAPILLARY BLOOD GLUCOSE          PATIENT CARE ACCESS DEVICES:  [ X ] Peripheral IV  [ ] Central Venous Line	[ ] R	[ ] L	[ ] IJ	[ ] Fem	[ ] SC	Placed:   [ ] Arterial Line		[ ] R	[ ] L	[ ] Fem	[ ] Rad	[ ] Ax	Placed:   [ ] PICC:					[ ] Mediport  [ ] Urinary Catheter, Date Placed:   [x] Necessity of urinary, arterial, and venous catheters discussed    OTHER MEDICATIONS: benzocaine 15 mG/menthol 3.6 mG Lozenge 1 Lozenge Oral every 2 hours PRN  naloxone Injectable 0.1 milliGRAM(s) IV Push every 3 minutes PRN  
PHILOMENA HUGO  [id number]    57y y.o presents to the Orthopaedic spine service with back pain.  Patient diagnosed with cervical arthritis with myelopathy requiring operative intervention with posterior spine fusion and laminectomy by ortho surgery.  Plastic surgery consulted intraoperatively to evaluate patient for muscle flap reconstruction of posterior spine wound given severe spine disease requiring wide exposure of spine structures, need for bilateral multilevel hardware placement, requring healthy vascularized muscle flap coverage of exposed vital stuctures and extensive foreign body.    Upper extremity weakness and numbness     Handoff    MEWS Score    Cervical arthritis with myelopathy    Cervical arthritis with myelopathy    Cervical radiculopathy    Pain    Elevated serum glucose    Fusion, spine, cervical, using posterolateral technique    LOW BACK PAIN, UNSPECIFIED    SysAdmin_VisitLink      No Known Allergies      T(C): 36.6 (11-11-22 @ 10:09), Max: 36.9 (11-10-22 @ 15:00)  HR: 110 (11-11-22 @ 10:09) (81 - 115)  BP: 111/61 (11-11-22 @ 10:09) (92/75 - 145/89)  RR: 18 (11-11-22 @ 10:09) (14 - 26)  SpO2: 99% (11-11-22 @ 10:09) (97% - 99%)  Intubated, prone position  18 cm spine wound with exposure of spine, intact bilateral hardware and bone graft with denuded adjacent muscles and soft tissue.        Imaging: Reviewed.

## 2022-11-11 NOTE — PROGRESS NOTE ADULT - SUBJECTIVE AND OBJECTIVE BOX
PHILOMENA HUGO   [id number]    Patient stable, tolerating diet, pain controlled on regimen.      T(C): 36.6 (11-11-22 @ 10:09), Max: 36.9 (11-10-22 @ 15:00)  HR: 110 (11-11-22 @ 10:09) (81 - 115)  BP: 111/61 (11-11-22 @ 10:09) (92/75 - 145/89)  RR: 18 (11-11-22 @ 10:09) (13 - 26)  SpO2: 99% (11-11-22 @ 10:09) (97% - 99%)  Wt(kg): --  NAD  Back: Dressing clean/dry/adherent.  Soft.  No collection.  Drains in situ.  BLE: No calf tenderness.      11-10 @ 07:01  -  11-11 @ 07:00  --------------------------------------------------------  IN: 80 mL / OUT: 2552 mL / NET: -2472 mL    11-11 @ 07:01  -  11-11 @ 13:44  --------------------------------------------------------  IN: 360 mL / OUT: 300 mL / NET: 60 mL      Hemovac: 60cc  CIELO: 90cc  acetaminophen     Tablet .. 975 milliGRAM(s) Oral every 8 hours  benzocaine 15 mG/menthol 3.6 mG Lozenge 1 Lozenge Oral every 2 hours PRN  bisacodyl 5 milliGRAM(s) Oral every 12 hours PRN  chlorhexidine 2% Cloths 1 Application(s) Topical <User Schedule>  cyclobenzaprine 10 milliGRAM(s) Oral every 8 hours  dexAMETHasone     Tablet 2 milliGRAM(s) Oral every 6 hours  diphenhydrAMINE 25 milliGRAM(s) Oral every 4 hours PRN  gabapentin 100 milliGRAM(s) Oral three times a day  HYDROmorphone  Injectable 0.5 milliGRAM(s) IV Push every 4 hours PRN  influenza   Vaccine 0.5 milliLiter(s) IntraMuscular once  insulin lispro (ADMELOG) corrective regimen sliding scale   SubCutaneous every 6 hours  magnesium hydroxide Suspension 30 milliLiter(s) Oral every 12 hours PRN  naloxone Injectable 0.1 milliGRAM(s) IV Push every 3 minutes PRN  ondansetron Injectable 4 milliGRAM(s) IV Push every 6 hours PRN  oxyCODONE    IR 5 milliGRAM(s) Oral every 4 hours PRN  oxyCODONE    IR 10 milliGRAM(s) Oral every 4 hours PRN  pantoprazole    Tablet 40 milliGRAM(s) Oral before breakfast  polyethylene glycol 3350 17 Gram(s) Oral every 12 hours  senna 2 Tablet(s) Oral at bedtime  traMADol 50 milliGRAM(s) Oral every 8 hours PRN                            10.3   14.09 )-----------( 230      ( 11 Nov 2022 06:16 )             30.7     11-11    138  |  103  |  14  ----------------------------<  163<H>  4.3   |  27  |  0.73    Ca    8.4      11 Nov 2022 06:12  Phos  2.6     11-11  Mg     2.0     11-11    TPro  5.9<L>  /  Alb  3.7  /  TBili  0.3  /  DBili  x   /  AST  53<H>  /  ALT  52<H>  /  AlkPhos  48  11-09      A/P: S/P posterior spine fusion with muscle flap reconstruction.  - Diet  - Pain control  - Drain Monitoring  - Abx  - DVT PPx: SCD, chemoprophylaxis as per spine service  - Will Follow    Thank You  Doc Alexander MD  Plastic Surgery

## 2022-11-11 NOTE — CONSULT NOTE ADULT - CONSULT REASON
On pressors for MAP goal >90
Pre op eval
intraoperative consult, spine surgery and need for plastic surgery spinal closure

## 2022-11-12 ENCOUNTER — TRANSCRIPTION ENCOUNTER (OUTPATIENT)
Age: 57
End: 2022-11-12

## 2022-11-12 LAB
A1C WITH ESTIMATED AVERAGE GLUCOSE RESULT: 5.5 % — SIGNIFICANT CHANGE UP (ref 4–5.6)
ESTIMATED AVERAGE GLUCOSE: 111 MG/DL — SIGNIFICANT CHANGE UP (ref 68–114)
GLUCOSE BLDC GLUCOMTR-MCNC: 126 MG/DL — HIGH (ref 70–99)
GLUCOSE BLDC GLUCOMTR-MCNC: 131 MG/DL — HIGH (ref 70–99)
GLUCOSE BLDC GLUCOMTR-MCNC: 145 MG/DL — HIGH (ref 70–99)
GLUCOSE BLDC GLUCOMTR-MCNC: 145 MG/DL — HIGH (ref 70–99)
GLUCOSE BLDC GLUCOMTR-MCNC: 196 MG/DL — HIGH (ref 70–99)

## 2022-11-12 RX ADMIN — Medication 2 MILLIGRAM(S): at 10:54

## 2022-11-12 RX ADMIN — Medication 975 MILLIGRAM(S): at 14:55

## 2022-11-12 RX ADMIN — Medication 975 MILLIGRAM(S): at 15:25

## 2022-11-12 RX ADMIN — PANTOPRAZOLE SODIUM 40 MILLIGRAM(S): 20 TABLET, DELAYED RELEASE ORAL at 06:55

## 2022-11-12 RX ADMIN — GABAPENTIN 100 MILLIGRAM(S): 400 CAPSULE ORAL at 14:55

## 2022-11-12 RX ADMIN — SENNA PLUS 2 TABLET(S): 8.6 TABLET ORAL at 22:07

## 2022-11-12 RX ADMIN — TRAMADOL HYDROCHLORIDE 50 MILLIGRAM(S): 50 TABLET ORAL at 20:15

## 2022-11-12 RX ADMIN — Medication 1 MILLIGRAM(S): at 18:05

## 2022-11-12 RX ADMIN — Medication 2 MILLIGRAM(S): at 06:54

## 2022-11-12 RX ADMIN — CYCLOBENZAPRINE HYDROCHLORIDE 10 MILLIGRAM(S): 10 TABLET, FILM COATED ORAL at 09:06

## 2022-11-12 RX ADMIN — Medication 1 MILLIGRAM(S): at 22:07

## 2022-11-12 RX ADMIN — GABAPENTIN 100 MILLIGRAM(S): 400 CAPSULE ORAL at 22:07

## 2022-11-12 RX ADMIN — GABAPENTIN 100 MILLIGRAM(S): 400 CAPSULE ORAL at 06:54

## 2022-11-12 RX ADMIN — Medication 975 MILLIGRAM(S): at 07:30

## 2022-11-12 RX ADMIN — CYCLOBENZAPRINE HYDROCHLORIDE 10 MILLIGRAM(S): 10 TABLET, FILM COATED ORAL at 18:05

## 2022-11-12 RX ADMIN — POLYETHYLENE GLYCOL 3350 17 GRAM(S): 17 POWDER, FOR SOLUTION ORAL at 18:04

## 2022-11-12 RX ADMIN — Medication 975 MILLIGRAM(S): at 22:07

## 2022-11-12 RX ADMIN — POLYETHYLENE GLYCOL 3350 17 GRAM(S): 17 POWDER, FOR SOLUTION ORAL at 09:07

## 2022-11-12 RX ADMIN — CYCLOBENZAPRINE HYDROCHLORIDE 10 MILLIGRAM(S): 10 TABLET, FILM COATED ORAL at 01:28

## 2022-11-12 RX ADMIN — TRAMADOL HYDROCHLORIDE 50 MILLIGRAM(S): 50 TABLET ORAL at 19:35

## 2022-11-12 RX ADMIN — Medication 975 MILLIGRAM(S): at 06:54

## 2022-11-12 NOTE — DISCHARGE NOTE NURSING/CASE MANAGEMENT/SOCIAL WORK - PATIENT PORTAL LINK FT
You can access the FollowMyHealth Patient Portal offered by [facility] by registering at the following website: [email or web address]. By joining [employer]’s FollowMyHealth portal, you will also be able to view your health information using other applications (apps) compatible with our system.

## 2022-11-12 NOTE — DISCHARGE NOTE NURSING/CASE MANAGEMENT/SOCIAL WORK - NSDCPEFALRISK_GEN_ALL_CORE
For information on Fall & Injury Prevention, visit: [email or web address] OR  [email or web address] OR  https://www.cdc.gov/steadi/patient.html

## 2022-11-12 NOTE — PROGRESS NOTE ADULT - SUBJECTIVE AND OBJECTIVE BOX
57M transferred from [facility] for evaluation and treatment of cervical myelopathy. He reports three years of right upper extremity weakness and gait unsteadiness after a fall onto back.  He denies seeing spine surgeon in past. No acute changes in RUE weakness or gait unsteadiness. Also reports new onset numbness L sole of foot. Denies pain/injury elsewhere. Denies tingling/paresthesia. Denies bowel/bladder incontinence. No saddle anesthesia. Denies fevers/chills. No other complaints at this time. Patient is scheduled for a PSF of C2-T2. Patient seen now resting comfortably. Patient seen post op in the PACU. Patient currently on pressors to keep perfusion pressure elevated. Patient states pain has been well controlled. Has started participating with physical therapy. Patient states pain has been well managed      MEDICATIONS  (STANDING):  acetaminophen     Tablet .. 975 milliGRAM(s) Oral every 8 hours  chlorhexidine 2% Cloths 1 Application(s) Topical <User Schedule>  cyclobenzaprine 10 milliGRAM(s) Oral every 8 hours  dexAMETHasone     Tablet 1 milliGRAM(s) Oral every 6 hours  gabapentin 100 milliGRAM(s) Oral three times a day  influenza   Vaccine 0.5 milliLiter(s) IntraMuscular once  insulin lispro (ADMELOG) corrective regimen sliding scale   SubCutaneous every 6 hours  pantoprazole    Tablet 40 milliGRAM(s) Oral before breakfast  polyethylene glycol 3350 17 Gram(s) Oral every 12 hours  senna 2 Tablet(s) Oral at bedtime    MEDICATIONS  (PRN):  benzocaine 15 mG/menthol 3.6 mG Lozenge 1 Lozenge Oral every 2 hours PRN Sore Throat  bisacodyl 5 milliGRAM(s) Oral every 12 hours PRN Constipation  diphenhydrAMINE 25 milliGRAM(s) Oral every 4 hours PRN Pruritus  HYDROmorphone  Injectable 0.5 milliGRAM(s) IV Push every 4 hours PRN Severe Pain (7 - 10)  magnesium hydroxide Suspension 30 milliLiter(s) Oral every 12 hours PRN Constipation  naloxone Injectable 0.1 milliGRAM(s) IV Push every 3 minutes PRN For ANY of the following changes in patient status:  A. RR LESS THAN 10 breaths per minute, B. Oxygen saturation LESS THAN 90%, C. Sedation score of 6  ondansetron Injectable 4 milliGRAM(s) IV Push every 6 hours PRN Nausea  oxyCODONE    IR 5 milliGRAM(s) Oral every 4 hours PRN Moderate Pain (4 - 6)  oxyCODONE    IR 10 milliGRAM(s) Oral every 4 hours PRN Severe Pain (7 - 10)  traMADol 50 milliGRAM(s) Oral every 8 hours PRN Mild Pain (1 - 3)          VITALS:   T(C): 36.7 (11-12-22 @ 14:39), Max: 36.7 (11-11-22 @ 16:42)  HR: 84 (11-12-22 @ 14:39) (84 - 111)  BP: 120/78 (11-12-22 @ 14:39) (107/70 - 145/71)  RR: 18 (11-12-22 @ 14:39) (17 - 18)  SpO2: 99% (11-12-22 @ 14:39) (96% - 100%)  Wt(kg): --    PHYSICAL EXAM:  GENERAL: NAD, well-groomed, well-developed  HEAD:  Atraumatic, Normocephalic  EYES: EOMI, PERRLA, conjunctiva and sclera clear  ENMT: No tonsillar erythema, exudates, or enlargement; Moist mucous membranes, Good dentition, No lesions  NECK: Supple, No JVD, Normal thyroid  NERVOUS SYSTEM:  Alert & Oriented X3, right sided weakness   CHEST/LUNG: Clear to percussion bilaterally; No rales, rhonchi, wheezing, or rubs  HEART: Regular rate and rhythm; No murmurs, rubs, or gallops  ABDOMEN: Soft, Nontender, Nondistended; Bowel sounds present  EXTREMITIES:  2+ Peripheral Pulses, No clubbing, cyanosis, or edema  LYMPH: No lymphadenopathy noted  SKIN: No rashes or lesions    LABS:        CBC Full  -  ( 11 Nov 2022 06:16 )  WBC Count : 14.09 K/uL  RBC Count : 3.53 M/uL  Hemoglobin : 10.3 g/dL  Hematocrit : 30.7 %  Platelet Count - Automated : 230 K/uL  Mean Cell Volume : 87.0 fl  Mean Cell Hemoglobin : 29.2 pg  Mean Cell Hemoglobin Concentration : 33.6 gm/dL  Auto Neutrophil # : x  Auto Lymphocyte # : x  Auto Monocyte # : x  Auto Eosinophil # : x  Auto Basophil # : x  Auto Neutrophil % : x  Auto Lymphocyte % : x  Auto Monocyte % : x  Auto Eosinophil % : x  Auto Basophil % : x    11-11    138  |  103  |  14  ----------------------------<  163<H>  4.3   |  27  |  0.73    Ca    8.4      11 Nov 2022 06:12  Phos  2.6     11-11  Mg     2.0     11-11            CAPILLARY BLOOD GLUCOSE      POCT Blood Glucose.: 145 mg/dL (12 Nov 2022 11:27)  POCT Blood Glucose.: 196 mg/dL (12 Nov 2022 05:46)  POCT Blood Glucose.: 126 mg/dL (12 Nov 2022 00:11)  POCT Blood Glucose.: 209 mg/dL (11 Nov 2022 18:08)      RADIOLOGY & ADDITIONAL TESTS:

## 2022-11-12 NOTE — PROGRESS NOTE ADULT - SUBJECTIVE AND OBJECTIVE BOX
POD4 s/p C2-T2 PSF, C3-C6 Laminectomy    Patient seen and examined. Doing well. Pain controlled. No acute overnight complaints or events. Subjective strength improving per patient.    Vital Signs Last 24 Hrs  T(C): 36.4 (12 Nov 2022 05:00), Max: 36.7 (11 Nov 2022 14:14)  T(F): 97.6 (12 Nov 2022 05:00), Max: 98.1 (11 Nov 2022 14:14)  HR: 104 (12 Nov 2022 05:00) (86 - 110)  BP: 111/66 (12 Nov 2022 05:00) (111/61 - 145/71)  BP(mean): --  RR: 18 (12 Nov 2022 05:00) (18 - 18)  SpO2: 96% (12 Nov 2022 05:00) (96% - 99%)    Parameters below as of 12 Nov 2022 05:00  Patient On (Oxygen Delivery Method): room air      Gen: NAD  PE  General: Awake alert no acute distress   Spine:  C collar in place, dressing CDI  HV and CIELO drains in place with SS output     Motor:                   C5                C6              C7               C8           T1   R            5/5                5/5            4/5             4/5          4/5  L             4/5               4/5            4/5             4/5          4/5                L2             L3             L4               L5            S1  R         5/5           5/5          5/5             5/5           5/5  L          5/5          5/5           5/5             5/5           5/5    Sensory:            C5         C6         C7      C8       T1        (0=absent, 1=impaired, 2=normal, NT=not testable)  R         2            2           2        2         2  L          2            2           2        2         2               L2          L3         L4      L5       S1         (0=absent, 1=impaired, 2=normal, NT=not testable)  R         2            2            2        2        2  L          2            2           2        2         2  **Patient reports that he feels like he is improving**      57y Male s/p C2-T2 PSF, C3-C6 lami, recovering appropriately    Medical comanagement appreciated  - Pain control PRN  - FU AM labs  -Decadron Taper  - WBAT  - PT/OT/OOB  - Incentive spirometry  - Monitor HMV/CIELO output, discontinue when appropriate  - DVT ppx: SCDs  - C Collar at all times  - Dispo Home per PT recs  -Will discuss with attending Dr. Casillas and advise if any changes to plan

## 2022-11-13 LAB
ANION GAP SERPL CALC-SCNC: 10 MMOL/L — SIGNIFICANT CHANGE UP (ref 5–17)
BUN SERPL-MCNC: 30 MG/DL — HIGH (ref 7–23)
CALCIUM SERPL-MCNC: 8.8 MG/DL — SIGNIFICANT CHANGE UP (ref 8.4–10.5)
CHLORIDE SERPL-SCNC: 101 MMOL/L — SIGNIFICANT CHANGE UP (ref 96–108)
CO2 SERPL-SCNC: 26 MMOL/L — SIGNIFICANT CHANGE UP (ref 22–31)
CREAT SERPL-MCNC: 0.81 MG/DL — SIGNIFICANT CHANGE UP (ref 0.5–1.3)
EGFR: 103 ML/MIN/1.73M2 — SIGNIFICANT CHANGE UP
GLUCOSE BLDC GLUCOMTR-MCNC: 118 MG/DL — HIGH (ref 70–99)
GLUCOSE BLDC GLUCOMTR-MCNC: 128 MG/DL — HIGH (ref 70–99)
GLUCOSE BLDC GLUCOMTR-MCNC: 140 MG/DL — HIGH (ref 70–99)
GLUCOSE BLDC GLUCOMTR-MCNC: 141 MG/DL — HIGH (ref 70–99)
GLUCOSE BLDC GLUCOMTR-MCNC: 144 MG/DL — HIGH (ref 70–99)
GLUCOSE SERPL-MCNC: 156 MG/DL — HIGH (ref 70–99)
HCT VFR BLD CALC: 37 % — LOW (ref 39–50)
HGB BLD-MCNC: 12.2 G/DL — LOW (ref 13–17)
MAGNESIUM SERPL-MCNC: 2.1 MG/DL — SIGNIFICANT CHANGE UP (ref 1.6–2.6)
MCHC RBC-ENTMCNC: 28.4 PG — SIGNIFICANT CHANGE UP (ref 27–34)
MCHC RBC-ENTMCNC: 33 GM/DL — SIGNIFICANT CHANGE UP (ref 32–36)
MCV RBC AUTO: 86.2 FL — SIGNIFICANT CHANGE UP (ref 80–100)
NRBC # BLD: 0 /100 WBCS — SIGNIFICANT CHANGE UP (ref 0–0)
PHOSPHATE SERPL-MCNC: 3.2 MG/DL — SIGNIFICANT CHANGE UP (ref 2.5–4.5)
PLATELET # BLD AUTO: 346 K/UL — SIGNIFICANT CHANGE UP (ref 150–400)
POTASSIUM SERPL-MCNC: 4.1 MMOL/L — SIGNIFICANT CHANGE UP (ref 3.5–5.3)
POTASSIUM SERPL-SCNC: 4.1 MMOL/L — SIGNIFICANT CHANGE UP (ref 3.5–5.3)
RBC # BLD: 4.29 M/UL — SIGNIFICANT CHANGE UP (ref 4.2–5.8)
RBC # FLD: 14.9 % — HIGH (ref 10.3–14.5)
SARS-COV-2 RNA SPEC QL NAA+PROBE: SIGNIFICANT CHANGE UP
SODIUM SERPL-SCNC: 137 MMOL/L — SIGNIFICANT CHANGE UP (ref 135–145)
WBC # BLD: 15.83 K/UL — HIGH (ref 3.8–10.5)
WBC # FLD AUTO: 15.83 K/UL — HIGH (ref 3.8–10.5)

## 2022-11-13 RX ADMIN — GABAPENTIN 100 MILLIGRAM(S): 400 CAPSULE ORAL at 21:45

## 2022-11-13 RX ADMIN — Medication 975 MILLIGRAM(S): at 21:45

## 2022-11-13 RX ADMIN — CYCLOBENZAPRINE HYDROCHLORIDE 10 MILLIGRAM(S): 10 TABLET, FILM COATED ORAL at 05:16

## 2022-11-13 RX ADMIN — CYCLOBENZAPRINE HYDROCHLORIDE 10 MILLIGRAM(S): 10 TABLET, FILM COATED ORAL at 17:46

## 2022-11-13 RX ADMIN — CYCLOBENZAPRINE HYDROCHLORIDE 10 MILLIGRAM(S): 10 TABLET, FILM COATED ORAL at 09:37

## 2022-11-13 RX ADMIN — Medication 975 MILLIGRAM(S): at 14:35

## 2022-11-13 RX ADMIN — OXYCODONE HYDROCHLORIDE 10 MILLIGRAM(S): 5 TABLET ORAL at 08:34

## 2022-11-13 RX ADMIN — PANTOPRAZOLE SODIUM 40 MILLIGRAM(S): 20 TABLET, DELAYED RELEASE ORAL at 05:29

## 2022-11-13 RX ADMIN — GABAPENTIN 100 MILLIGRAM(S): 400 CAPSULE ORAL at 14:06

## 2022-11-13 RX ADMIN — Medication 1 MILLIGRAM(S): at 11:06

## 2022-11-13 RX ADMIN — Medication 975 MILLIGRAM(S): at 14:05

## 2022-11-13 RX ADMIN — Medication 975 MILLIGRAM(S): at 05:16

## 2022-11-13 RX ADMIN — GABAPENTIN 100 MILLIGRAM(S): 400 CAPSULE ORAL at 05:16

## 2022-11-13 RX ADMIN — Medication 1 MILLIGRAM(S): at 05:16

## 2022-11-13 RX ADMIN — OXYCODONE HYDROCHLORIDE 10 MILLIGRAM(S): 5 TABLET ORAL at 08:04

## 2022-11-13 NOTE — PROGRESS NOTE ADULT - ATTENDING COMMENTS
Doing well, subjectively his strength has improved and his pain is controlled. Maintain C collar.
Doing well, subjectively his strength has improved and his pain is controlled. Maintain C collar.
alert and awake and comfortable sitting in chair   may stop PCA since not using  multi modal pain control  [id number] checks, in c collar per spine  on RA  AM CXR, distended bowel loops  encourage IS  met MAP goal 65 without pressors since 8am  nl lact  reg diet  increase bowel regiment, no BM yet  dc IV fluids  wynn dc, TOV  discuss VTE PPX plan with spine  off AB, afebrile  leukocytosis , post oeprative  glycemic control, on decadron taper  may go to floor
Continue to mobilize as able. PT/OT. Doing well

## 2022-11-13 NOTE — PROGRESS NOTE ADULT - SUBJECTIVE AND OBJECTIVE BOX
57M transferred from [facility] for evaluation and treatment of cervical myelopathy. He reports three years of right upper extremity weakness and gait unsteadiness after a fall onto back.  He denies seeing spine surgeon in past. No acute changes in RUE weakness or gait unsteadiness. Also reports new onset numbness L sole of foot. Denies pain/injury elsewhere. Denies tingling/paresthesia. Denies bowel/bladder incontinence. No saddle anesthesia. Denies fevers/chills. No other complaints at this time. Patient is scheduled for a PSF of C2-T2. Patient seen now resting comfortably. Patient seen post op in the PACU. Patient currently on pressors to keep perfusion pressure elevated. Patient states pain has been well controlled. Has started participating with physical therapy. Patient states pain has been well managed      MEDICATIONS  (STANDING):  acetaminophen     Tablet .. 975 milliGRAM(s) Oral every 8 hours  chlorhexidine 2% Cloths 1 Application(s) Topical <User Schedule>  cyclobenzaprine 10 milliGRAM(s) Oral every 8 hours  gabapentin 100 milliGRAM(s) Oral three times a day  influenza   Vaccine 0.5 milliLiter(s) IntraMuscular once  insulin lispro (ADMELOG) corrective regimen sliding scale   SubCutaneous every 6 hours  pantoprazole    Tablet 40 milliGRAM(s) Oral before breakfast  polyethylene glycol 3350 17 Gram(s) Oral every 12 hours  senna 2 Tablet(s) Oral at bedtime    MEDICATIONS  (PRN):  benzocaine 15 mG/menthol 3.6 mG Lozenge 1 Lozenge Oral every 2 hours PRN Sore Throat  bisacodyl 5 milliGRAM(s) Oral every 12 hours PRN Constipation  diphenhydrAMINE 25 milliGRAM(s) Oral every 4 hours PRN Pruritus  HYDROmorphone  Injectable 0.5 milliGRAM(s) IV Push every 4 hours PRN Severe Pain (7 - 10)  magnesium hydroxide Suspension 30 milliLiter(s) Oral every 12 hours PRN Constipation  naloxone Injectable 0.1 milliGRAM(s) IV Push every 3 minutes PRN For ANY of the following changes in patient status:  A. RR LESS THAN 10 breaths per minute, B. Oxygen saturation LESS THAN 90%, C. Sedation score of 6  ondansetron Injectable 4 milliGRAM(s) IV Push every 6 hours PRN Nausea  oxyCODONE    IR 5 milliGRAM(s) Oral every 4 hours PRN Moderate Pain (4 - 6)  oxyCODONE    IR 10 milliGRAM(s) Oral every 4 hours PRN Severe Pain (7 - 10)  traMADol 50 milliGRAM(s) Oral every 8 hours PRN Mild Pain (1 - 3)          VITALS:   T(C): 36.7 (11-13-22 @ 08:50), Max: 37 (11-12-22 @ 16:45)  HR: 92 (11-13-22 @ 08:50) (82 - 99)  BP: 126/80 (11-13-22 @ 08:50) (111/69 - 126/80)  RR: 17 (11-13-22 @ 08:50) (17 - 18)  SpO2: 99% (11-13-22 @ 08:50) (97% - 99%)  Wt(kg): --    PHYSICAL EXAM:  GENERAL: NAD, well-groomed, well-developed  HEAD:  Atraumatic, Normocephalic  EYES: EOMI, PERRLA, conjunctiva and sclera clear  ENMT: No tonsillar erythema, exudates, or enlargement; Moist mucous membranes, Good dentition, No lesions  NECK: Supple, No JVD, Normal thyroid  NERVOUS SYSTEM:  Alert & Oriented X3, right sided weakness   CHEST/LUNG: Clear to percussion bilaterally; No rales, rhonchi, wheezing, or rubs  HEART: Regular rate and rhythm; No murmurs, rubs, or gallops  ABDOMEN: Soft, Nontender, Nondistended; Bowel sounds present  EXTREMITIES:  2+ Peripheral Pulses, No clubbing, cyanosis, or edema  LYMPH: No lymphadenopathy noted  SKIN: No rashes or lesions    LABS:        CBC Full  -  ( 13 Nov 2022 08:56 )  WBC Count : 15.83 K/uL  RBC Count : 4.29 M/uL  Hemoglobin : 12.2 g/dL  Hematocrit : 37.0 %  Platelet Count - Automated : 346 K/uL  Mean Cell Volume : 86.2 fl  Mean Cell Hemoglobin : 28.4 pg  Mean Cell Hemoglobin Concentration : 33.0 gm/dL  Auto Neutrophil # : x  Auto Lymphocyte # : x  Auto Monocyte # : x  Auto Eosinophil # : x  Auto Basophil # : x  Auto Neutrophil % : x  Auto Lymphocyte % : x  Auto Monocyte % : x  Auto Eosinophil % : x  Auto Basophil % : x    11-13    137  |  101  |  30<H>  ----------------------------<  156<H>  4.1   |  26  |  0.81    Ca    8.8      13 Nov 2022 08:56  Phos  3.2     11-13  Mg     2.1     11-13            CAPILLARY BLOOD GLUCOSE      POCT Blood Glucose.: 144 mg/dL (13 Nov 2022 11:28)  POCT Blood Glucose.: 118 mg/dL (13 Nov 2022 05:04)  POCT Blood Glucose.: 131 mg/dL (12 Nov 2022 23:34)  POCT Blood Glucose.: 145 mg/dL (12 Nov 2022 17:22)      RADIOLOGY & ADDITIONAL TESTS:

## 2022-11-13 NOTE — PROGRESS NOTE ADULT - SUBJECTIVE AND OBJECTIVE BOX
Orthopaedic Surgery Progress Note    Subjective:   Patient seen and examined. Doing well. Pain controlled. No acute overnight complaints or events. Subjective strength improving per patient.    Objective:  T(C): 36.7 (11-13-22 @ 08:50), Max: 37 (11-12-22 @ 16:45)  HR: 92 (11-13-22 @ 08:50) (82 - 110)  BP: 126/80 (11-13-22 @ 08:50) (107/70 - 126/80)  RR: 17 (11-13-22 @ 08:50) (17 - 18)  SpO2: 99% (11-13-22 @ 08:50) (97% - 100%)  Wt(kg): --    11-12 @ 07:01  -  11-13 @ 07:00  --------------------------------------------------------  IN: 1120 mL / OUT: 174 mL / NET: 946 mL        Physical Exam:    Gen: NAD  PE  General: Awake alert no acute distress   Spine:  C collar in place, dressing CDI  HV and CIELO drains in place with SS output     Motor:                   C5                C6              C7               C8           T1   R            5/5                5/5            4/5             4/5          4/5  L             4/5               4/5            4/5             4/5          4/5                L2             L3             L4               L5            S1  R         5/5           5/5          5/5             5/5           5/5  L          5/5          5/5           5/5             5/5           5/5    Sensory:            C5         C6         C7      C8       T1        (0=absent, 1=impaired, 2=normal, NT=not testable)  R         2            2           2        2         2  L          2            2           2        2         2               L2          L3         L4      L5       S1         (0=absent, 1=impaired, 2=normal, NT=not testable)  R         2            2            2        2        2  L          2            2           2        2         2  **Patient reports that he feels like he is improving**                            12.2   15.83 )-----------( 346      ( 13 Nov 2022 08:56 )             37.0     11-13    137  |  101  |  30<H>  ----------------------------<  156<H>  4.1   |  26  |  0.81    Ca    8.8      13 Nov 2022 08:56  Phos  3.2     11-13  Mg     2.1     11-13

## 2022-11-14 LAB
ANION GAP SERPL CALC-SCNC: 8 MMOL/L — SIGNIFICANT CHANGE UP (ref 5–17)
BUN SERPL-MCNC: 30 MG/DL — HIGH (ref 7–23)
CALCIUM SERPL-MCNC: 8.2 MG/DL — LOW (ref 8.4–10.5)
CHLORIDE SERPL-SCNC: 101 MMOL/L — SIGNIFICANT CHANGE UP (ref 96–108)
CO2 SERPL-SCNC: 27 MMOL/L — SIGNIFICANT CHANGE UP (ref 22–31)
CREAT SERPL-MCNC: 0.87 MG/DL — SIGNIFICANT CHANGE UP (ref 0.5–1.3)
EGFR: 101 ML/MIN/1.73M2 — SIGNIFICANT CHANGE UP
GLUCOSE BLDC GLUCOMTR-MCNC: 108 MG/DL — HIGH (ref 70–99)
GLUCOSE BLDC GLUCOMTR-MCNC: 78 MG/DL — SIGNIFICANT CHANGE UP (ref 70–99)
GLUCOSE BLDC GLUCOMTR-MCNC: 90 MG/DL — SIGNIFICANT CHANGE UP (ref 70–99)
GLUCOSE SERPL-MCNC: 85 MG/DL — SIGNIFICANT CHANGE UP (ref 70–99)
HCT VFR BLD CALC: 33.7 % — LOW (ref 39–50)
HGB BLD-MCNC: 10.7 G/DL — LOW (ref 13–17)
MAGNESIUM SERPL-MCNC: 2 MG/DL — SIGNIFICANT CHANGE UP (ref 1.6–2.6)
MCHC RBC-ENTMCNC: 27.8 PG — SIGNIFICANT CHANGE UP (ref 27–34)
MCHC RBC-ENTMCNC: 31.8 GM/DL — LOW (ref 32–36)
MCV RBC AUTO: 87.5 FL — SIGNIFICANT CHANGE UP (ref 80–100)
NRBC # BLD: 0 /100 WBCS — SIGNIFICANT CHANGE UP (ref 0–0)
PHOSPHATE SERPL-MCNC: 3.4 MG/DL — SIGNIFICANT CHANGE UP (ref 2.5–4.5)
PLATELET # BLD AUTO: 327 K/UL — SIGNIFICANT CHANGE UP (ref 150–400)
POTASSIUM SERPL-MCNC: 4.3 MMOL/L — SIGNIFICANT CHANGE UP (ref 3.5–5.3)
POTASSIUM SERPL-SCNC: 4.3 MMOL/L — SIGNIFICANT CHANGE UP (ref 3.5–5.3)
RBC # BLD: 3.85 M/UL — LOW (ref 4.2–5.8)
RBC # FLD: 14.6 % — HIGH (ref 10.3–14.5)
SODIUM SERPL-SCNC: 136 MMOL/L — SIGNIFICANT CHANGE UP (ref 135–145)
WBC # BLD: 13.05 K/UL — HIGH (ref 3.8–10.5)
WBC # FLD AUTO: 13.05 K/UL — HIGH (ref 3.8–10.5)

## 2022-11-14 RX ADMIN — Medication 975 MILLIGRAM(S): at 22:15

## 2022-11-14 RX ADMIN — Medication 975 MILLIGRAM(S): at 05:01

## 2022-11-14 RX ADMIN — CYCLOBENZAPRINE HYDROCHLORIDE 10 MILLIGRAM(S): 10 TABLET, FILM COATED ORAL at 18:02

## 2022-11-14 RX ADMIN — CYCLOBENZAPRINE HYDROCHLORIDE 10 MILLIGRAM(S): 10 TABLET, FILM COATED ORAL at 05:03

## 2022-11-14 RX ADMIN — Medication 975 MILLIGRAM(S): at 14:41

## 2022-11-14 RX ADMIN — CHLORHEXIDINE GLUCONATE 1 APPLICATION(S): 213 SOLUTION TOPICAL at 07:24

## 2022-11-14 RX ADMIN — GABAPENTIN 100 MILLIGRAM(S): 400 CAPSULE ORAL at 05:01

## 2022-11-14 RX ADMIN — PANTOPRAZOLE SODIUM 40 MILLIGRAM(S): 20 TABLET, DELAYED RELEASE ORAL at 05:01

## 2022-11-14 RX ADMIN — Medication 975 MILLIGRAM(S): at 21:15

## 2022-11-14 RX ADMIN — CYCLOBENZAPRINE HYDROCHLORIDE 10 MILLIGRAM(S): 10 TABLET, FILM COATED ORAL at 11:46

## 2022-11-14 RX ADMIN — Medication 975 MILLIGRAM(S): at 14:11

## 2022-11-14 RX ADMIN — GABAPENTIN 100 MILLIGRAM(S): 400 CAPSULE ORAL at 14:11

## 2022-11-14 RX ADMIN — GABAPENTIN 100 MILLIGRAM(S): 400 CAPSULE ORAL at 21:15

## 2022-11-14 NOTE — PROGRESS NOTE ADULT - SUBJECTIVE AND OBJECTIVE BOX
Orthopaedic Surgery Progress Note    Subjective:   Patient seen and examined. Doing well. Pain controlled. No acute overnight complaints or events. Subjective strength improving per patient.      VITAL SIGNS:  T(C): 36.7 (11-14-22 @ 05:05), Max: 37 (11-13-22 @ 20:39)  HR: 85 (11-14-22 @ 05:05) (85 - 111)  BP: 110/71 (11-14-22 @ 05:05) (107/70 - 126/80)  RR: 18 (11-14-22 @ 05:05) (17 - 18)  SpO2: 100% (11-14-22 @ 05:05) (97% - 100%)      LABS:                        12.2   15.83 )-----------( 346      ( 13 Nov 2022 08:56 )             37.0     11-13    137  |  101  |  30<H>  ----------------------------<  156<H>  4.1   |  26  |  0.81    Ca    8.8      13 Nov 2022 08:56  Phos  3.2     11-13  Mg     2.1     11-13      Physical Exam:    Gen: NAD  PE  General: Awake alert no acute distress   Spine:  C collar in place, dressing CDI  HV and CIELO drains in place with SS output     Motor:                   C5                C6              C7               C8           T1   R            5/5                5/5            4/5             4/5          4/5  L             4/5               4/5            4/5             4/5          4/5                L2             L3             L4               L5            S1  R         5/5           5/5          5/5             5/5           5/5  L          5/5          5/5           5/5             5/5           5/5    Sensory:            C5         C6         C7      C8       T1        (0=absent, 1=impaired, 2=normal, NT=not testable)  R         2            2           2        2         2  L          2            2           2        2         2               L2          L3         L4      L5       S1         (0=absent, 1=impaired, 2=normal, NT=not testable)  R         2            2            2        2        2  L          2            2           2        2         2  **Patient reports that he feels like he is improving**

## 2022-11-14 NOTE — PROGRESS NOTE ADULT - SUBJECTIVE AND OBJECTIVE BOX
57M transferred from [facility] for evaluation and treatment of cervical myelopathy. He reports three years of right upper extremity weakness and gait unsteadiness after a fall onto back.  He denies seeing spine surgeon in past. No acute changes in RUE weakness or gait unsteadiness. Also reports new onset numbness L sole of foot. Denies pain/injury elsewhere. Denies tingling/paresthesia. Denies bowel/bladder incontinence. No saddle anesthesia. Denies fevers/chills. No other complaints at this time. Patient is scheduled for a PSF of C2-T2. Patient seen now resting comfortably. Patient seen post op in the PACU. Has started participating with physical therapy. Patient states pain has been well managed. P with continue drainage from wounds    MEDICATIONS  (STANDING):  acetaminophen     Tablet .. 975 milliGRAM(s) Oral every 8 hours  chlorhexidine 2% Cloths 1 Application(s) Topical <User Schedule>  cyclobenzaprine 10 milliGRAM(s) Oral every 8 hours  gabapentin 100 milliGRAM(s) Oral three times a day  influenza   Vaccine 0.5 milliLiter(s) IntraMuscular once  pantoprazole    Tablet 40 milliGRAM(s) Oral before breakfast  polyethylene glycol 3350 17 Gram(s) Oral every 12 hours  senna 2 Tablet(s) Oral at bedtime    MEDICATIONS  (PRN):  benzocaine 15 mG/menthol 3.6 mG Lozenge 1 Lozenge Oral every 2 hours PRN Sore Throat  bisacodyl 5 milliGRAM(s) Oral every 12 hours PRN Constipation  diphenhydrAMINE 25 milliGRAM(s) Oral every 4 hours PRN Pruritus  HYDROmorphone  Injectable 0.5 milliGRAM(s) IV Push every 4 hours PRN Severe Pain (7 - 10)  magnesium hydroxide Suspension 30 milliLiter(s) Oral every 12 hours PRN Constipation  naloxone Injectable 0.1 milliGRAM(s) IV Push every 3 minutes PRN For ANY of the following changes in patient status:  A. RR LESS THAN 10 breaths per minute, B. Oxygen saturation LESS THAN 90%, C. Sedation score of 6  ondansetron Injectable 4 milliGRAM(s) IV Push every 6 hours PRN Nausea  oxyCODONE    IR 5 milliGRAM(s) Oral every 4 hours PRN Moderate Pain (4 - 6)  oxyCODONE    IR 10 milliGRAM(s) Oral every 4 hours PRN Severe Pain (7 - 10)  traMADol 50 milliGRAM(s) Oral every 8 hours PRN Mild Pain (1 - 3)          VITALS:   T(C): 36.9 (11-14-22 @ 09:00), Max: 37 (11-13-22 @ 20:39)  HR: 98 (11-14-22 @ 09:00) (85 - 111)  BP: 102/70 (11-14-22 @ 09:00) (102/70 - 122/51)  RR: 18 (11-14-22 @ 09:00) (17 - 18)  SpO2: 98% (11-14-22 @ 09:00) (97% - 100%)  Wt(kg): --    PHYSICAL EXAM:  GENERAL: NAD, well-groomed, well-developed  HEAD:  Atraumatic, Normocephalic  EYES: EOMI, PERRLA, conjunctiva and sclera clear  ENMT: No tonsillar erythema, exudates, or enlargement; Moist mucous membranes, Good dentition, No lesions  NECK: Supple, No JVD, Normal thyroid  NERVOUS SYSTEM:  Alert & Oriented X3, right sided weakness   CHEST/LUNG: Clear to percussion bilaterally; No rales, rhonchi, wheezing, or rubs  HEART: Regular rate and rhythm; No murmurs, rubs, or gallops  ABDOMEN: Soft, Nontender, Nondistended; Bowel sounds present  EXTREMITIES:  2+ Peripheral Pulses, No clubbing, cyanosis, or edema  LYMPH: No lymphadenopathy noted  SKIN: No rashes or lesions    LABS:        CBC Full  -  ( 14 Nov 2022 07:18 )  WBC Count : 13.05 K/uL  RBC Count : 3.85 M/uL  Hemoglobin : 10.7 g/dL  Hematocrit : 33.7 %  Platelet Count - Automated : 327 K/uL  Mean Cell Volume : 87.5 fl  Mean Cell Hemoglobin : 27.8 pg  Mean Cell Hemoglobin Concentration : 31.8 gm/dL  Auto Neutrophil # : x  Auto Lymphocyte # : x  Auto Monocyte # : x  Auto Eosinophil # : x  Auto Basophil # : x  Auto Neutrophil % : x  Auto Lymphocyte % : x  Auto Monocyte % : x  Auto Eosinophil % : x  Auto Basophil % : x    11-14    136  |  101  |  30<H>  ----------------------------<  85  4.3   |  27  |  0.87    Ca    8.2<L>      14 Nov 2022 07:17  Phos  3.4     11-14  Mg     2.0     11-14            CAPILLARY BLOOD GLUCOSE      POCT Blood Glucose.: 108 mg/dL (14 Nov 2022 11:35)  POCT Blood Glucose.: 78 mg/dL (14 Nov 2022 07:38)  POCT Blood Glucose.: 90 mg/dL (14 Nov 2022 06:18)  POCT Blood Glucose.: 141 mg/dL (13 Nov 2022 23:53)  POCT Blood Glucose.: 128 mg/dL (13 Nov 2022 23:25)  POCT Blood Glucose.: 140 mg/dL (13 Nov 2022 16:58)      RADIOLOGY & ADDITIONAL TESTS:

## 2022-11-15 LAB
ANION GAP SERPL CALC-SCNC: 8 MMOL/L — SIGNIFICANT CHANGE UP (ref 5–17)
BUN SERPL-MCNC: 22 MG/DL — SIGNIFICANT CHANGE UP (ref 7–23)
CALCIUM SERPL-MCNC: 8.4 MG/DL — SIGNIFICANT CHANGE UP (ref 8.4–10.5)
CHLORIDE SERPL-SCNC: 102 MMOL/L — SIGNIFICANT CHANGE UP (ref 96–108)
CO2 SERPL-SCNC: 26 MMOL/L — SIGNIFICANT CHANGE UP (ref 22–31)
CREAT SERPL-MCNC: 0.76 MG/DL — SIGNIFICANT CHANGE UP (ref 0.5–1.3)
EGFR: 105 ML/MIN/1.73M2 — SIGNIFICANT CHANGE UP
GLUCOSE SERPL-MCNC: 81 MG/DL — SIGNIFICANT CHANGE UP (ref 70–99)
HCT VFR BLD CALC: 31.9 % — LOW (ref 39–50)
HGB BLD-MCNC: 10.6 G/DL — LOW (ref 13–17)
MAGNESIUM SERPL-MCNC: 2 MG/DL — SIGNIFICANT CHANGE UP (ref 1.6–2.6)
MCHC RBC-ENTMCNC: 28.9 PG — SIGNIFICANT CHANGE UP (ref 27–34)
MCHC RBC-ENTMCNC: 33.2 GM/DL — SIGNIFICANT CHANGE UP (ref 32–36)
MCV RBC AUTO: 86.9 FL — SIGNIFICANT CHANGE UP (ref 80–100)
NRBC # BLD: 0 /100 WBCS — SIGNIFICANT CHANGE UP (ref 0–0)
PHOSPHATE SERPL-MCNC: 4 MG/DL — SIGNIFICANT CHANGE UP (ref 2.5–4.5)
PLATELET # BLD AUTO: 295 K/UL — SIGNIFICANT CHANGE UP (ref 150–400)
POTASSIUM SERPL-MCNC: 4.6 MMOL/L — SIGNIFICANT CHANGE UP (ref 3.5–5.3)
POTASSIUM SERPL-SCNC: 4.6 MMOL/L — SIGNIFICANT CHANGE UP (ref 3.5–5.3)
RBC # BLD: 3.67 M/UL — LOW (ref 4.2–5.8)
RBC # FLD: 14.6 % — HIGH (ref 10.3–14.5)
SODIUM SERPL-SCNC: 136 MMOL/L — SIGNIFICANT CHANGE UP (ref 135–145)
WBC # BLD: 11.13 K/UL — HIGH (ref 3.8–10.5)
WBC # FLD AUTO: 11.13 K/UL — HIGH (ref 3.8–10.5)

## 2022-11-15 RX ADMIN — CYCLOBENZAPRINE HYDROCHLORIDE 10 MILLIGRAM(S): 10 TABLET, FILM COATED ORAL at 02:34

## 2022-11-15 RX ADMIN — Medication 975 MILLIGRAM(S): at 14:13

## 2022-11-15 RX ADMIN — OXYCODONE HYDROCHLORIDE 5 MILLIGRAM(S): 5 TABLET ORAL at 08:15

## 2022-11-15 RX ADMIN — Medication 975 MILLIGRAM(S): at 05:26

## 2022-11-15 RX ADMIN — CYCLOBENZAPRINE HYDROCHLORIDE 10 MILLIGRAM(S): 10 TABLET, FILM COATED ORAL at 10:31

## 2022-11-15 RX ADMIN — Medication 975 MILLIGRAM(S): at 22:32

## 2022-11-15 RX ADMIN — GABAPENTIN 100 MILLIGRAM(S): 400 CAPSULE ORAL at 21:32

## 2022-11-15 RX ADMIN — GABAPENTIN 100 MILLIGRAM(S): 400 CAPSULE ORAL at 05:26

## 2022-11-15 RX ADMIN — SENNA PLUS 2 TABLET(S): 8.6 TABLET ORAL at 21:33

## 2022-11-15 RX ADMIN — Medication 975 MILLIGRAM(S): at 06:26

## 2022-11-15 RX ADMIN — OXYCODONE HYDROCHLORIDE 5 MILLIGRAM(S): 5 TABLET ORAL at 08:45

## 2022-11-15 RX ADMIN — PANTOPRAZOLE SODIUM 40 MILLIGRAM(S): 20 TABLET, DELAYED RELEASE ORAL at 05:26

## 2022-11-15 RX ADMIN — Medication 975 MILLIGRAM(S): at 21:32

## 2022-11-15 RX ADMIN — GABAPENTIN 100 MILLIGRAM(S): 400 CAPSULE ORAL at 13:43

## 2022-11-15 RX ADMIN — Medication 975 MILLIGRAM(S): at 13:43

## 2022-11-15 RX ADMIN — CYCLOBENZAPRINE HYDROCHLORIDE 10 MILLIGRAM(S): 10 TABLET, FILM COATED ORAL at 17:09

## 2022-11-15 NOTE — PROVIDER CONTACT NOTE (OTHER) - BACKGROUND
Started around 630am, did not tell anyone until now. Patient is in no distress. OOB to chair, eating breakfast. States only when takes deep breaths has sharp pains.

## 2022-11-15 NOTE — PROVIDER CONTACT NOTE (OTHER) - ACTION/TREATMENT ORDERED:
Provider States to use Incentive Spiromotor, not believed to be cardiac related. No other interventions.

## 2022-11-15 NOTE — PROGRESS NOTE ADULT - SUBJECTIVE AND OBJECTIVE BOX
Patient seen and examined at bedside. Pain well controlled with medication. Patient denies any numbness, tingling, weakness, or any other orthopaedic complaint. Denies N/V/CP/SOB.         VITAL SIGNS:  T(C): 36.5 (11-15-22 @ 05:00), Max: 37 (11-14-22 @ 21:33)  HR: 82 (11-15-22 @ 05:00) (78 - 98)  BP: 99/67 (11-15-22 @ 05:00) (98/64 - 109/71)  RR: 18 (11-15-22 @ 05:00) (18 - 18)  SpO2: 98% (11-15-22 @ 05:00) (97% - 100%)      LABS:                        10.6   11.13 )-----------( 295      ( 15 Nov 2022 05:36 )             31.9     11-15    136  |  102  |  22  ----------------------------<  81  4.6   |  26  |  0.76    Ca    8.4      15 Nov 2022 05:47  Phos  4.0     11-15  Mg     2.0     11-15                    Physical Exam:    Gen: NAD  PE  General: Awake alert no acute distress   Spine:  C collar in place, dressing CDI  HV and CIELO drains in place with SS output     Motor:                   C5                C6              C7               C8           T1   R            5/5                5/5            4/5             4/5          4/5  L             4/5               4/5            4/5             4/5          4/5                L2             L3             L4               L5            S1  R         5/5           5/5          5/5             5/5           5/5  L          5/5          5/5           5/5             5/5           5/5    Sensory:            C5         C6         C7      C8       T1        (0=absent, 1=impaired, 2=normal, NT=not testable)  R         2            2           2        2         2  L          2            2           2        2         2               L2          L3         L4      L5       S1         (0=absent, 1=impaired, 2=normal, NT=not testable)  R         2            2            2        2        2  L          2            2           2        2         2  **Patient reports that he feels like he is improving**

## 2022-11-15 NOTE — PROGRESS NOTE ADULT - SUBJECTIVE AND OBJECTIVE BOX
57M transferred from [facility] for evaluation and treatment of cervical myelopathy. He reports three years of right upper extremity weakness and gait unsteadiness after a fall onto back.  He denies seeing spine surgeon in past. No acute changes in RUE weakness or gait unsteadiness. Also reports new onset numbness L sole of foot. Denies pain/injury elsewhere. Denies tingling/paresthesia. Denies bowel/bladder incontinence. No saddle anesthesia. Denies fevers/chills. No other complaints at this time. Patient is scheduled for a PSF of C2-T2. Patient seen now resting comfortably. Has started participating with physical therapy. Patient states pain has been well managed.  with continue drainage from wounds      MEDICATIONS  (STANDING):  acetaminophen     Tablet .. 975 milliGRAM(s) Oral every 8 hours  chlorhexidine 2% Cloths 1 Application(s) Topical <User Schedule>  cyclobenzaprine 10 milliGRAM(s) Oral every 8 hours  gabapentin 100 milliGRAM(s) Oral three times a day  influenza   Vaccine 0.5 milliLiter(s) IntraMuscular once  pantoprazole    Tablet 40 milliGRAM(s) Oral before breakfast  polyethylene glycol 3350 17 Gram(s) Oral every 12 hours  senna 2 Tablet(s) Oral at bedtime    MEDICATIONS  (PRN):  benzocaine 15 mG/menthol 3.6 mG Lozenge 1 Lozenge Oral every 2 hours PRN Sore Throat  bisacodyl 5 milliGRAM(s) Oral every 12 hours PRN Constipation  diphenhydrAMINE 25 milliGRAM(s) Oral every 4 hours PRN Pruritus  HYDROmorphone  Injectable 0.5 milliGRAM(s) IV Push every 4 hours PRN Severe Pain (7 - 10)  magnesium hydroxide Suspension 30 milliLiter(s) Oral every 12 hours PRN Constipation  naloxone Injectable 0.1 milliGRAM(s) IV Push every 3 minutes PRN For ANY of the following changes in patient status:  A. RR LESS THAN 10 breaths per minute, B. Oxygen saturation LESS THAN 90%, C. Sedation score of 6  ondansetron Injectable 4 milliGRAM(s) IV Push every 6 hours PRN Nausea  oxyCODONE    IR 5 milliGRAM(s) Oral every 4 hours PRN Moderate Pain (4 - 6)  oxyCODONE    IR 10 milliGRAM(s) Oral every 4 hours PRN Severe Pain (7 - 10)  traMADol 50 milliGRAM(s) Oral every 8 hours PRN Mild Pain (1 - 3)          VITALS:   T(C): 36.6 (11-15-22 @ 10:25), Max: 37 (11-14-22 @ 21:33)  HR: 84 (11-15-22 @ 10:25) (82 - 94)  BP: 104/68 (11-15-22 @ 10:25) (98/64 - 105/71)  RR: 18 (11-15-22 @ 10:25) (18 - 18)  SpO2: 99% (11-15-22 @ 10:25) (98% - 100%)  Wt(kg): --    PHYSICAL EXAM:  GENERAL: NAD, well-groomed, well-developed  HEAD:  Atraumatic, Normocephalic  EYES: EOMI, PERRLA, conjunctiva and sclera clear  ENMT: No tonsillar erythema, exudates, or enlargement; Moist mucous membranes, Good dentition, No lesions  NECK: Supple, No JVD, Normal thyroid  NERVOUS SYSTEM:  Alert & Oriented X3, right sided weakness   CHEST/LUNG: Clear to percussion bilaterally; No rales, rhonchi, wheezing, or rubs  HEART: Regular rate and rhythm; No murmurs, rubs, or gallops  ABDOMEN: Soft, Nontender, Nondistended; Bowel sounds present  EXTREMITIES:  2+ Peripheral Pulses, No clubbing, cyanosis, or edema  LYMPH: No lymphadenopathy noted  SKIN: No rashes or lesions    LABS:        CBC Full  -  ( 15 Nov 2022 05:36 )  WBC Count : 11.13 K/uL  RBC Count : 3.67 M/uL  Hemoglobin : 10.6 g/dL  Hematocrit : 31.9 %  Platelet Count - Automated : 295 K/uL  Mean Cell Volume : 86.9 fl  Mean Cell Hemoglobin : 28.9 pg  Mean Cell Hemoglobin Concentration : 33.2 gm/dL  Auto Neutrophil # : x  Auto Lymphocyte # : x  Auto Monocyte # : x  Auto Eosinophil # : x  Auto Basophil # : x  Auto Neutrophil % : x  Auto Lymphocyte % : x  Auto Monocyte % : x  Auto Eosinophil % : x  Auto Basophil % : x    11-15    136  |  102  |  22  ----------------------------<  81  4.6   |  26  |  0.76    Ca    8.4      15 Nov 2022 05:47  Phos  4.0     11-15  Mg     2.0     11-15            CAPILLARY BLOOD GLUCOSE          RADIOLOGY & ADDITIONAL TESTS:

## 2022-11-16 ENCOUNTER — APPOINTMENT (OUTPATIENT)
Dept: ORTHOPEDIC SURGERY | Facility: CLINIC | Age: 57
End: 2022-11-16

## 2022-11-16 LAB
ANION GAP SERPL CALC-SCNC: 8 MMOL/L — SIGNIFICANT CHANGE UP (ref 5–17)
BUN SERPL-MCNC: 24 MG/DL — HIGH (ref 7–23)
CALCIUM SERPL-MCNC: 8.4 MG/DL — SIGNIFICANT CHANGE UP (ref 8.4–10.5)
CHLORIDE SERPL-SCNC: 99 MMOL/L — SIGNIFICANT CHANGE UP (ref 96–108)
CO2 SERPL-SCNC: 26 MMOL/L — SIGNIFICANT CHANGE UP (ref 22–31)
CREAT SERPL-MCNC: 0.82 MG/DL — SIGNIFICANT CHANGE UP (ref 0.5–1.3)
EGFR: 102 ML/MIN/1.73M2 — SIGNIFICANT CHANGE UP
GLUCOSE SERPL-MCNC: 110 MG/DL — HIGH (ref 70–99)
HCT VFR BLD CALC: 30.4 % — LOW (ref 39–50)
HGB BLD-MCNC: 10 G/DL — LOW (ref 13–17)
MAGNESIUM SERPL-MCNC: 1.9 MG/DL — SIGNIFICANT CHANGE UP (ref 1.6–2.6)
MCHC RBC-ENTMCNC: 28.6 PG — SIGNIFICANT CHANGE UP (ref 27–34)
MCHC RBC-ENTMCNC: 32.9 GM/DL — SIGNIFICANT CHANGE UP (ref 32–36)
MCV RBC AUTO: 86.9 FL — SIGNIFICANT CHANGE UP (ref 80–100)
NRBC # BLD: 0 /100 WBCS — SIGNIFICANT CHANGE UP (ref 0–0)
PHOSPHATE SERPL-MCNC: 3.6 MG/DL — SIGNIFICANT CHANGE UP (ref 2.5–4.5)
PLATELET # BLD AUTO: 340 K/UL — SIGNIFICANT CHANGE UP (ref 150–400)
POTASSIUM SERPL-MCNC: 4.4 MMOL/L — SIGNIFICANT CHANGE UP (ref 3.5–5.3)
POTASSIUM SERPL-SCNC: 4.4 MMOL/L — SIGNIFICANT CHANGE UP (ref 3.5–5.3)
RBC # BLD: 3.5 M/UL — LOW (ref 4.2–5.8)
RBC # FLD: 14.6 % — HIGH (ref 10.3–14.5)
SODIUM SERPL-SCNC: 133 MMOL/L — LOW (ref 135–145)
WBC # BLD: 13.32 K/UL — HIGH (ref 3.8–10.5)
WBC # FLD AUTO: 13.32 K/UL — HIGH (ref 3.8–10.5)

## 2022-11-16 RX ADMIN — GABAPENTIN 100 MILLIGRAM(S): 400 CAPSULE ORAL at 05:12

## 2022-11-16 RX ADMIN — SENNA PLUS 2 TABLET(S): 8.6 TABLET ORAL at 21:42

## 2022-11-16 RX ADMIN — Medication 975 MILLIGRAM(S): at 22:43

## 2022-11-16 RX ADMIN — CYCLOBENZAPRINE HYDROCHLORIDE 10 MILLIGRAM(S): 10 TABLET, FILM COATED ORAL at 17:15

## 2022-11-16 RX ADMIN — CYCLOBENZAPRINE HYDROCHLORIDE 10 MILLIGRAM(S): 10 TABLET, FILM COATED ORAL at 10:47

## 2022-11-16 RX ADMIN — Medication 975 MILLIGRAM(S): at 06:12

## 2022-11-16 RX ADMIN — CYCLOBENZAPRINE HYDROCHLORIDE 10 MILLIGRAM(S): 10 TABLET, FILM COATED ORAL at 02:28

## 2022-11-16 RX ADMIN — Medication 975 MILLIGRAM(S): at 16:45

## 2022-11-16 RX ADMIN — Medication 975 MILLIGRAM(S): at 14:42

## 2022-11-16 RX ADMIN — GABAPENTIN 100 MILLIGRAM(S): 400 CAPSULE ORAL at 16:42

## 2022-11-16 RX ADMIN — POLYETHYLENE GLYCOL 3350 17 GRAM(S): 17 POWDER, FOR SOLUTION ORAL at 17:16

## 2022-11-16 RX ADMIN — POLYETHYLENE GLYCOL 3350 17 GRAM(S): 17 POWDER, FOR SOLUTION ORAL at 05:12

## 2022-11-16 RX ADMIN — GABAPENTIN 100 MILLIGRAM(S): 400 CAPSULE ORAL at 21:42

## 2022-11-16 RX ADMIN — Medication 975 MILLIGRAM(S): at 21:43

## 2022-11-16 RX ADMIN — Medication 975 MILLIGRAM(S): at 05:12

## 2022-11-16 RX ADMIN — PANTOPRAZOLE SODIUM 40 MILLIGRAM(S): 20 TABLET, DELAYED RELEASE ORAL at 05:12

## 2022-11-16 NOTE — PROGRESS NOTE ADULT - SUBJECTIVE AND OBJECTIVE BOX
Patient seen and examined at bedside. Pain well controlled with medication. Patient denies any numbness, tingling, weakness, or any other orthopaedic complaint. Denies N/V/CP/SOB.         VITAL SIGNS:  T(C): 37.1 (11-16-22 @ 05:00), Max: 37.3 (11-15-22 @ 19:24)  HR: 99 (11-16-22 @ 05:00) (52 - 99)  BP: 122/78 (11-16-22 @ 05:00) (96/64 - 122/78)  RR: 18 (11-16-22 @ 05:00) (17 - 18)  SpO2: 97% (11-16-22 @ 05:00) (96% - 100%)      LABS:                        10.0   13.32 )-----------( 340      ( 16 Nov 2022 06:27 )             30.4     11-16    133<L>  |  99  |  24<H>  ----------------------------<  110<H>  4.4   |  26  |  0.82    Ca    8.4      16 Nov 2022 06:27  Phos  3.6     11-16  Mg     1.9     11-16        Physical Exam:    Gen: NAD  PE  General: Awake alert no acute distress   Spine:  C collar in place, dressing CDI  HV and CIELO drains in place with SS output     Motor:                   C5                C6              C7               C8           T1   R            5/5                5/5            4/5             4/5          4/5  L             4/5               4/5            4/5             4/5          4/5                L2             L3             L4               L5            S1  R         5/5           5/5          5/5             5/5           5/5  L          5/5          5/5           5/5             5/5           5/5    Sensory:            C5         C6         C7      C8       T1        (0=absent, 1=impaired, 2=normal, NT=not testable)  R         2            2           2        2         2  L          2            2           2        2         2               L2          L3         L4      L5       S1         (0=absent, 1=impaired, 2=normal, NT=not testable)  R         2            2            2        2        2  L          2            2           2        2         2  **Patient reports that he feels like he is improving**

## 2022-11-16 NOTE — CHART NOTE - NSCHARTNOTEFT_GEN_A_CORE
Called to bedside to discuss patient with Dr Landry. Pt SBP 80's started on phenylephrine by Dr Landry. In Case patient had 4 liters of Normosol in  UO 3500. Dr Casillas requested MAP >90. ABG sent ABG - ( 08 Nov 2022 17:15 )  pH, Arterial: 7.42  pH, Blood: x     /  pCO2: 35    /  pO2: 138   / HCO3: 23    / Base Excess: -1.3  /  SaO2: 98.5  Ion Ca 1.08 Lactate 4.5 H/H 12/37. Ordered Calcium gluconate 1 gm, and 100 ml of 25% albumin in light of 4 liters Intraop. ABG ordered. Awaiting results. D/W Dr Nava.  Currently Patient awake alert and oriented . MAP 93 on 0.4 mcg/kg/min of phenylephrine gtt. Offering no complaints.
Post Operative Note  Patient: PHILOMENA HUGO 57y (1965) Male   MRN: [id number]  Location: [facility] PACU 05  Visit: 11-06-22 Inpatient  Date: 11-08-22 @ 18:23    Procedure: S/P C2-T2 PSF, C3-6 lami    Subjective: Patient seen and examined in PACU post-op. Patient was seen while on 0.6 of Dylan with MAPs in the 110s. Patient states that pain is currently well controlled on PCA. Denies any N/V, chest pain, or SOB.       Objective:  Vitals: T(F): 97.6 (11-08-22 @ 04:35), Max: 97.9 (11-07-22 @ 23:43)  HR: 58 (11-08-22 @ 18:00)  BP: 149/99 (11-08-22 @ 18:00) (89/54 - 149/99)  RR: 16 (11-08-22 @ 18:00)  SpO2: 100% (11-08-22 @ 18:00)      In:   11-07-22 @ 07:01  -  11-08-22 @ 07:00  --------------------------------------------------------  IN: 925 mL    11-08-22 @ 07:01  -  11-08-22 @ 18:23  --------------------------------------------------------  IN: 220 mL      IV Fluids: albumin human 25% IVPB 100 milliLiter(s) IV Intermittent once  lactated ringers. 1000 milliLiter(s) (120 mL/Hr) IV Continuous <Continuous>      Out:   11-07-22 @ 07:01  -  11-08-22 @ 07:00  --------------------------------------------------------  OUT: 1800 mL    11-08-22 @ 07:01  -  11-08-22 @ 18:23  --------------------------------------------------------  OUT: 450 mL      EBL:     Voided Urine:   11-07-22 @ 07:01  -  11-08-22 @ 07:00  --------------------------------------------------------  OUT: 1800 mL    11-08-22 @ 07:01  -  11-08-22 @ 18:23  --------------------------------------------------------  OUT: 450 mL      Physical Examination:  General: NAD, resting in bed with C-collar  Respiratory: Nonlabored respirations, normal CW expansion.  Motor:                   C5                C6              C7               C8           T1   R            4/5                4/5            4/5             4/5          4/5  L             5/5               5/5             5/5             5/5          5/5                L2             L3             L4               L5            S1  R         5/5           5/5          5/5             5/5           5/5  L          5/5          5/5           5/5             5/5           5/5    Sensory:            C5         C6         C7      C8       T1        (0=absent, 1=impaired, 2=normal, NT=not testable)  R         2            2           2        2         2  L          2            2           2        2         2               L2          L3         L4      L5       S1         (0=absent, 1=impaired, 2=normal, NT=not testable)  R         2            2            2        2        2  L          2            2           2        2         2    + Shelton's bilaterally  + clonus bilaterally 1-3 beats     Assessment:  57yMale patient S/P C2-T2 PSF, C3-6 lami for cervical myelopathy     Plan:  - IV Abx: post-op ancef  - Pain control PRN  - Diet: regular  - Activity: WBAT in C-collar  - f/u lactate  - SICU consult for q1 neuro checks and for MAP goals >90  - Wean off Dylan as tolerated      Date/Time: 11-08-22 @ 18:23
Patient seen and examined. HMV drain removed. CIELO drain remains intact. Will continue to follow output and discontinue when appropriate. Full plan per morning progress note.

## 2022-11-16 NOTE — PROGRESS NOTE ADULT - SUBJECTIVE AND OBJECTIVE BOX
57M transferred from [facility] for evaluation and treatment of cervical myelopathy. He reports three years of right upper extremity weakness and gait unsteadiness after a fall onto back.  He denies seeing spine surgeon in past. No acute changes in RUE weakness or gait unsteadiness. Also reports new onset numbness L sole of foot. Denies pain/injury elsewhere. Denies tingling/paresthesia. Denies bowel/bladder incontinence. No saddle anesthesia. Denies fevers/chills. No other complaints at this time. Patient is scheduled for a PSF of C2-T2. Patient seen now resting comfortably. Has started participating with physical therapy. Patient states pain has been well managed. Hemovac removed today. Continue tomhave drainage for the CIELO     MEDICATIONS  (STANDING):  acetaminophen     Tablet .. 975 milliGRAM(s) Oral every 8 hours  chlorhexidine 2% Cloths 1 Application(s) Topical <User Schedule>  cyclobenzaprine 10 milliGRAM(s) Oral every 8 hours  gabapentin 100 milliGRAM(s) Oral three times a day  influenza   Vaccine 0.5 milliLiter(s) IntraMuscular once  pantoprazole    Tablet 40 milliGRAM(s) Oral before breakfast  polyethylene glycol 3350 17 Gram(s) Oral every 12 hours  senna 2 Tablet(s) Oral at bedtime    MEDICATIONS  (PRN):  benzocaine 15 mG/menthol 3.6 mG Lozenge 1 Lozenge Oral every 2 hours PRN Sore Throat  bisacodyl 5 milliGRAM(s) Oral every 12 hours PRN Constipation  diphenhydrAMINE 25 milliGRAM(s) Oral every 4 hours PRN Pruritus  HYDROmorphone  Injectable 0.5 milliGRAM(s) IV Push every 4 hours PRN Severe Pain (7 - 10)  magnesium hydroxide Suspension 30 milliLiter(s) Oral every 12 hours PRN Constipation  naloxone Injectable 0.1 milliGRAM(s) IV Push every 3 minutes PRN For ANY of the following changes in patient status:  A. RR LESS THAN 10 breaths per minute, B. Oxygen saturation LESS THAN 90%, C. Sedation score of 6  ondansetron Injectable 4 milliGRAM(s) IV Push every 6 hours PRN Nausea  oxyCODONE    IR 5 milliGRAM(s) Oral every 4 hours PRN Moderate Pain (4 - 6)  oxyCODONE    IR 10 milliGRAM(s) Oral every 4 hours PRN Severe Pain (7 - 10)  traMADol 50 milliGRAM(s) Oral every 8 hours PRN Mild Pain (1 - 3)          VITALS:   T(C): 37.1 (11-16-22 @ 13:12), Max: 37.3 (11-15-22 @ 19:24)  HR: 74 (11-16-22 @ 13:12) (52 - 99)  BP: 119/75 (11-16-22 @ 13:12) (98/61 - 122/78)  RR: 18 (11-16-22 @ 13:12) (17 - 18)  SpO2: 97% (11-16-22 @ 13:12) (96% - 100%)  Wt(kg): --    PHYSICAL EXAM:  GENERAL: NAD, well-groomed, well-developed  HEAD:  Atraumatic, Normocephalic  EYES: EOMI, PERRLA, conjunctiva and sclera clear  ENMT: No tonsillar erythema, exudates, or enlargement; Moist mucous membranes, Good dentition, No lesions  NECK: Supple, No JVD, Normal thyroid  NERVOUS SYSTEM:  Alert & Oriented X3, right sided weakness   CHEST/LUNG: Clear to percussion bilaterally; No rales, rhonchi, wheezing, or rubs  HEART: Regular rate and rhythm; No murmurs, rubs, or gallops  ABDOMEN: Soft, Nontender, Nondistended; Bowel sounds present  EXTREMITIES:  2+ Peripheral Pulses, No clubbing, cyanosis, or edema  LYMPH: No lymphadenopathy noted  SKIN: No rashes or lesions    LABS:        CBC Full  -  ( 16 Nov 2022 06:27 )  WBC Count : 13.32 K/uL  RBC Count : 3.50 M/uL  Hemoglobin : 10.0 g/dL  Hematocrit : 30.4 %  Platelet Count - Automated : 340 K/uL  Mean Cell Volume : 86.9 fl  Mean Cell Hemoglobin : 28.6 pg  Mean Cell Hemoglobin Concentration : 32.9 gm/dL  Auto Neutrophil # : x  Auto Lymphocyte # : x  Auto Monocyte # : x  Auto Eosinophil # : x  Auto Basophil # : x  Auto Neutrophil % : x  Auto Lymphocyte % : x  Auto Monocyte % : x  Auto Eosinophil % : x  Auto Basophil % : x    11-16    133<L>  |  99  |  24<H>  ----------------------------<  110<H>  4.4   |  26  |  0.82    Ca    8.4      16 Nov 2022 06:27  Phos  3.6     11-16  Mg     1.9     11-16            CAPILLARY BLOOD GLUCOSE          RADIOLOGY & ADDITIONAL TESTS:

## 2022-11-17 LAB
ANION GAP SERPL CALC-SCNC: 10 MMOL/L — SIGNIFICANT CHANGE UP (ref 5–17)
BUN SERPL-MCNC: 22 MG/DL — SIGNIFICANT CHANGE UP (ref 7–23)
CALCIUM SERPL-MCNC: 8.2 MG/DL — LOW (ref 8.4–10.5)
CHLORIDE SERPL-SCNC: 100 MMOL/L — SIGNIFICANT CHANGE UP (ref 96–108)
CO2 SERPL-SCNC: 25 MMOL/L — SIGNIFICANT CHANGE UP (ref 22–31)
CREAT SERPL-MCNC: 0.76 MG/DL — SIGNIFICANT CHANGE UP (ref 0.5–1.3)
EGFR: 105 ML/MIN/1.73M2 — SIGNIFICANT CHANGE UP
GLUCOSE BLDC GLUCOMTR-MCNC: 101 MG/DL — HIGH (ref 70–99)
GLUCOSE SERPL-MCNC: 98 MG/DL — SIGNIFICANT CHANGE UP (ref 70–99)
HCT VFR BLD CALC: 30.1 % — LOW (ref 39–50)
HGB BLD-MCNC: 9.9 G/DL — LOW (ref 13–17)
MAGNESIUM SERPL-MCNC: 1.9 MG/DL — SIGNIFICANT CHANGE UP (ref 1.6–2.6)
MCHC RBC-ENTMCNC: 28.4 PG — SIGNIFICANT CHANGE UP (ref 27–34)
MCHC RBC-ENTMCNC: 32.9 GM/DL — SIGNIFICANT CHANGE UP (ref 32–36)
MCV RBC AUTO: 86.2 FL — SIGNIFICANT CHANGE UP (ref 80–100)
NRBC # BLD: 0 /100 WBCS — SIGNIFICANT CHANGE UP (ref 0–0)
PHOSPHATE SERPL-MCNC: 3.2 MG/DL — SIGNIFICANT CHANGE UP (ref 2.5–4.5)
PLATELET # BLD AUTO: 359 K/UL — SIGNIFICANT CHANGE UP (ref 150–400)
POTASSIUM SERPL-MCNC: 4.4 MMOL/L — SIGNIFICANT CHANGE UP (ref 3.5–5.3)
POTASSIUM SERPL-SCNC: 4.4 MMOL/L — SIGNIFICANT CHANGE UP (ref 3.5–5.3)
RBC # BLD: 3.49 M/UL — LOW (ref 4.2–5.8)
RBC # FLD: 14.6 % — HIGH (ref 10.3–14.5)
SODIUM SERPL-SCNC: 135 MMOL/L — SIGNIFICANT CHANGE UP (ref 135–145)
WBC # BLD: 12.18 K/UL — HIGH (ref 3.8–10.5)
WBC # FLD AUTO: 12.18 K/UL — HIGH (ref 3.8–10.5)

## 2022-11-17 RX ADMIN — OXYCODONE HYDROCHLORIDE 5 MILLIGRAM(S): 5 TABLET ORAL at 17:54

## 2022-11-17 RX ADMIN — GABAPENTIN 100 MILLIGRAM(S): 400 CAPSULE ORAL at 14:08

## 2022-11-17 RX ADMIN — PANTOPRAZOLE SODIUM 40 MILLIGRAM(S): 20 TABLET, DELAYED RELEASE ORAL at 05:16

## 2022-11-17 RX ADMIN — OXYCODONE HYDROCHLORIDE 5 MILLIGRAM(S): 5 TABLET ORAL at 17:24

## 2022-11-17 RX ADMIN — CYCLOBENZAPRINE HYDROCHLORIDE 10 MILLIGRAM(S): 10 TABLET, FILM COATED ORAL at 18:00

## 2022-11-17 RX ADMIN — Medication 975 MILLIGRAM(S): at 05:15

## 2022-11-17 RX ADMIN — Medication 975 MILLIGRAM(S): at 14:38

## 2022-11-17 RX ADMIN — CYCLOBENZAPRINE HYDROCHLORIDE 10 MILLIGRAM(S): 10 TABLET, FILM COATED ORAL at 10:30

## 2022-11-17 RX ADMIN — GABAPENTIN 100 MILLIGRAM(S): 400 CAPSULE ORAL at 21:58

## 2022-11-17 RX ADMIN — Medication 975 MILLIGRAM(S): at 22:28

## 2022-11-17 RX ADMIN — Medication 975 MILLIGRAM(S): at 14:08

## 2022-11-17 RX ADMIN — POLYETHYLENE GLYCOL 3350 17 GRAM(S): 17 POWDER, FOR SOLUTION ORAL at 05:15

## 2022-11-17 RX ADMIN — Medication 975 MILLIGRAM(S): at 21:58

## 2022-11-17 RX ADMIN — SENNA PLUS 2 TABLET(S): 8.6 TABLET ORAL at 21:58

## 2022-11-17 RX ADMIN — GABAPENTIN 100 MILLIGRAM(S): 400 CAPSULE ORAL at 05:15

## 2022-11-17 RX ADMIN — CYCLOBENZAPRINE HYDROCHLORIDE 10 MILLIGRAM(S): 10 TABLET, FILM COATED ORAL at 02:02

## 2022-11-17 NOTE — PROGRESS NOTE ADULT - SUBJECTIVE AND OBJECTIVE BOX
Orthopaedic Surgery Progress Note    Subjective:   Patient seen and examined at bedside. Pain well controlled with medication. Patient denies any numbness, tingling, weakness, or any other orthopaedic complaint. Denies N/V/CP/SOB.     Objective:  T(C): 37.2 (11-17-22 @ 04:45), Max: 37.2 (11-17-22 @ 04:45)  HR: 117 (11-17-22 @ 04:45) (72 - 117)  BP: 123/86 (11-17-22 @ 04:45) (103/70 - 123/86)  RR: 18 (11-17-22 @ 04:45) (18 - 18)  SpO2: 99% (11-17-22 @ 04:45) (95% - 100%)  Wt(kg): --    11-16 @ 07:01  -  11-17 @ 07:00  --------------------------------------------------------  IN: 680 mL / OUT: 133 mL / NET: 547 mL        Physical Exam:    Gen: NAD  PE  General: Awake alert no acute distress   Spine:  C collar in place, dressing CDI  CIELO drain in place with SS output     Motor:                   C5                C6              C7               C8           T1   R            5/5                5/5            4/5             4/5          4/5  L             4/5               4/5            4/5             4/5          4/5                L2             L3             L4               L5            S1  R         5/5           5/5          5/5             5/5           5/5  L          5/5          5/5           5/5             5/5           5/5    Sensory:            C5         C6         C7      C8       T1        (0=absent, 1=impaired, 2=normal, NT=not testable)  R         2            2           2        2         2  L          2            2           2        2         2               L2          L3         L4      L5       S1         (0=absent, 1=impaired, 2=normal, NT=not testable)  R         2            2            2        2        2  L          2            2           2        2         2  **Patient reports that he feels like he is improving**                            10.0   13.32 )-----------( 340      ( 16 Nov 2022 06:27 )             30.4     11-16    133<L>  |  99  |  24<H>  ----------------------------<  110<H>  4.4   |  26  |  0.82    Ca    8.4      16 Nov 2022 06:27  Phos  3.6     11-16  Mg     1.9     11-16

## 2022-11-17 NOTE — PROGRESS NOTE ADULT - SUBJECTIVE AND OBJECTIVE BOX
57M transferred from [facility] for evaluation and treatment of cervical myelopathy. He reports three years of right upper extremity weakness and gait unsteadiness after a fall onto back.  He denies seeing spine surgeon in past. No acute changes in RUE weakness or gait unsteadiness. Also reports new onset numbness L sole of foot. Denies pain/injury elsewhere. Denies tingling/paresthesia. Denies bowel/bladder incontinence. No saddle anesthesia. Denies fevers/chills. No other complaints at this time. Patient is scheduled for a PSF of C2-T2. Patient seen now resting comfortably. Has started participating with physical therapy. Patient states pain has been well managed. Hemovac removed today. Drainage from CIELO drain seems to have decreased      MEDICATIONS  (STANDING):  acetaminophen     Tablet .. 975 milliGRAM(s) Oral every 8 hours  chlorhexidine 2% Cloths 1 Application(s) Topical <User Schedule>  cyclobenzaprine 10 milliGRAM(s) Oral every 8 hours  gabapentin 100 milliGRAM(s) Oral three times a day  influenza   Vaccine 0.5 milliLiter(s) IntraMuscular once  pantoprazole    Tablet 40 milliGRAM(s) Oral before breakfast  polyethylene glycol 3350 17 Gram(s) Oral every 12 hours  senna 2 Tablet(s) Oral at bedtime    MEDICATIONS  (PRN):  benzocaine 15 mG/menthol 3.6 mG Lozenge 1 Lozenge Oral every 2 hours PRN Sore Throat  bisacodyl 5 milliGRAM(s) Oral every 12 hours PRN Constipation  diphenhydrAMINE 25 milliGRAM(s) Oral every 4 hours PRN Pruritus  HYDROmorphone  Injectable 0.5 milliGRAM(s) IV Push every 4 hours PRN Severe Pain (7 - 10)  magnesium hydroxide Suspension 30 milliLiter(s) Oral every 12 hours PRN Constipation  naloxone Injectable 0.1 milliGRAM(s) IV Push every 3 minutes PRN For ANY of the following changes in patient status:  A. RR LESS THAN 10 breaths per minute, B. Oxygen saturation LESS THAN 90%, C. Sedation score of 6  ondansetron Injectable 4 milliGRAM(s) IV Push every 6 hours PRN Nausea  oxyCODONE    IR 5 milliGRAM(s) Oral every 4 hours PRN Moderate Pain (4 - 6)  oxyCODONE    IR 10 milliGRAM(s) Oral every 4 hours PRN Severe Pain (7 - 10)  traMADol 50 milliGRAM(s) Oral every 8 hours PRN Mild Pain (1 - 3)          VITALS:   T(C): 37.1 (11-17-22 @ 09:28), Max: 37.2 (11-17-22 @ 04:45)  HR: 78 (11-17-22 @ 09:28) (72 - 117)  BP: 127/78 (11-17-22 @ 09:28) (103/70 - 127/78)  RR: 18 (11-17-22 @ 09:28) (18 - 18)  SpO2: 98% (11-17-22 @ 09:28) (95% - 100%)  Wt(kg): --    PHYSICAL EXAM:  GENERAL: NAD, well-groomed, well-developed  HEAD:  Atraumatic, Normocephalic  EYES: EOMI, PERRLA, conjunctiva and sclera clear  ENMT: No tonsillar erythema, exudates, or enlargement; Moist mucous membranes, Good dentition, No lesions  NECK: Supple, No JVD, Normal thyroid  NERVOUS SYSTEM:  Alert & Oriented X3, right sided weakness   CHEST/LUNG: Clear to percussion bilaterally; No rales, rhonchi, wheezing, or rubs  HEART: Regular rate and rhythm; No murmurs, rubs, or gallops  ABDOMEN: Soft, Nontender, Nondistended; Bowel sounds present  EXTREMITIES:  2+ Peripheral Pulses, No clubbing, cyanosis, or edema  LYMPH: No lymphadenopathy noted  SKIN: No rashes or lesions    LABS:        CBC Full  -  ( 17 Nov 2022 07:29 )  WBC Count : 12.18 K/uL  RBC Count : 3.49 M/uL  Hemoglobin : 9.9 g/dL  Hematocrit : 30.1 %  Platelet Count - Automated : 359 K/uL  Mean Cell Volume : 86.2 fl  Mean Cell Hemoglobin : 28.4 pg  Mean Cell Hemoglobin Concentration : 32.9 gm/dL  Auto Neutrophil # : x  Auto Lymphocyte # : x  Auto Monocyte # : x  Auto Eosinophil # : x  Auto Basophil # : x  Auto Neutrophil % : x  Auto Lymphocyte % : x  Auto Monocyte % : x  Auto Eosinophil % : x  Auto Basophil % : x    11-17    135  |  100  |  22  ----------------------------<  98  4.4   |  25  |  0.76    Ca    8.2<L>      17 Nov 2022 07:31  Phos  3.2     11-17  Mg     1.9     11-17            CAPILLARY BLOOD GLUCOSE      POCT Blood Glucose.: 101 mg/dL (17 Nov 2022 11:32)      RADIOLOGY & ADDITIONAL TESTS:

## 2022-11-18 LAB
ANION GAP SERPL CALC-SCNC: 5 MMOL/L — SIGNIFICANT CHANGE UP (ref 5–17)
BUN SERPL-MCNC: 18 MG/DL — SIGNIFICANT CHANGE UP (ref 7–23)
CALCIUM SERPL-MCNC: 8.2 MG/DL — LOW (ref 8.4–10.5)
CHLORIDE SERPL-SCNC: 101 MMOL/L — SIGNIFICANT CHANGE UP (ref 96–108)
CO2 SERPL-SCNC: 28 MMOL/L — SIGNIFICANT CHANGE UP (ref 22–31)
CREAT SERPL-MCNC: 0.83 MG/DL — SIGNIFICANT CHANGE UP (ref 0.5–1.3)
EGFR: 102 ML/MIN/1.73M2 — SIGNIFICANT CHANGE UP
GLUCOSE SERPL-MCNC: 91 MG/DL — SIGNIFICANT CHANGE UP (ref 70–99)
HCT VFR BLD CALC: 30.4 % — LOW (ref 39–50)
HGB BLD-MCNC: 9.8 G/DL — LOW (ref 13–17)
MAGNESIUM SERPL-MCNC: 1.9 MG/DL — SIGNIFICANT CHANGE UP (ref 1.6–2.6)
MCHC RBC-ENTMCNC: 28.1 PG — SIGNIFICANT CHANGE UP (ref 27–34)
MCHC RBC-ENTMCNC: 32.2 GM/DL — SIGNIFICANT CHANGE UP (ref 32–36)
MCV RBC AUTO: 87.1 FL — SIGNIFICANT CHANGE UP (ref 80–100)
NRBC # BLD: 0 /100 WBCS — SIGNIFICANT CHANGE UP (ref 0–0)
PHOSPHATE SERPL-MCNC: 3.3 MG/DL — SIGNIFICANT CHANGE UP (ref 2.5–4.5)
PLATELET # BLD AUTO: 360 K/UL — SIGNIFICANT CHANGE UP (ref 150–400)
POTASSIUM SERPL-MCNC: 4.3 MMOL/L — SIGNIFICANT CHANGE UP (ref 3.5–5.3)
POTASSIUM SERPL-SCNC: 4.3 MMOL/L — SIGNIFICANT CHANGE UP (ref 3.5–5.3)
RBC # BLD: 3.49 M/UL — LOW (ref 4.2–5.8)
RBC # FLD: 14.6 % — HIGH (ref 10.3–14.5)
SODIUM SERPL-SCNC: 134 MMOL/L — LOW (ref 135–145)
WBC # BLD: 10.93 K/UL — HIGH (ref 3.8–10.5)
WBC # FLD AUTO: 10.93 K/UL — HIGH (ref 3.8–10.5)

## 2022-11-18 RX ORDER — OXYCODONE HYDROCHLORIDE 5 MG/1
5 TABLET ORAL EVERY 4 HOURS
Refills: 0 | Status: DISCONTINUED | OUTPATIENT
Start: 2022-11-18 | End: 2022-11-19

## 2022-11-18 RX ORDER — OXYCODONE HYDROCHLORIDE 5 MG/1
10 TABLET ORAL EVERY 4 HOURS
Refills: 0 | Status: DISCONTINUED | OUTPATIENT
Start: 2022-11-18 | End: 2022-11-19

## 2022-11-18 RX ADMIN — PANTOPRAZOLE SODIUM 40 MILLIGRAM(S): 20 TABLET, DELAYED RELEASE ORAL at 06:28

## 2022-11-18 RX ADMIN — GABAPENTIN 100 MILLIGRAM(S): 400 CAPSULE ORAL at 21:08

## 2022-11-18 RX ADMIN — OXYCODONE HYDROCHLORIDE 10 MILLIGRAM(S): 5 TABLET ORAL at 13:34

## 2022-11-18 RX ADMIN — CYCLOBENZAPRINE HYDROCHLORIDE 10 MILLIGRAM(S): 10 TABLET, FILM COATED ORAL at 21:08

## 2022-11-18 RX ADMIN — GABAPENTIN 100 MILLIGRAM(S): 400 CAPSULE ORAL at 13:34

## 2022-11-18 RX ADMIN — POLYETHYLENE GLYCOL 3350 17 GRAM(S): 17 POWDER, FOR SOLUTION ORAL at 17:06

## 2022-11-18 RX ADMIN — Medication 975 MILLIGRAM(S): at 13:34

## 2022-11-18 RX ADMIN — CYCLOBENZAPRINE HYDROCHLORIDE 10 MILLIGRAM(S): 10 TABLET, FILM COATED ORAL at 06:28

## 2022-11-18 RX ADMIN — Medication 975 MILLIGRAM(S): at 21:08

## 2022-11-18 RX ADMIN — Medication 975 MILLIGRAM(S): at 14:13

## 2022-11-18 RX ADMIN — Medication 975 MILLIGRAM(S): at 06:29

## 2022-11-18 RX ADMIN — Medication 975 MILLIGRAM(S): at 07:00

## 2022-11-18 RX ADMIN — Medication 975 MILLIGRAM(S): at 21:38

## 2022-11-18 RX ADMIN — GABAPENTIN 100 MILLIGRAM(S): 400 CAPSULE ORAL at 06:28

## 2022-11-18 RX ADMIN — CYCLOBENZAPRINE HYDROCHLORIDE 10 MILLIGRAM(S): 10 TABLET, FILM COATED ORAL at 13:34

## 2022-11-18 RX ADMIN — OXYCODONE HYDROCHLORIDE 10 MILLIGRAM(S): 5 TABLET ORAL at 14:13

## 2022-11-18 NOTE — DIETITIAN INITIAL EVALUATION ADULT - REASON FOR ADMISSION
Per chart, Pt is a 58 y/o M with PMH: cervical radiculopathy presents from [facility] for further evaluation. Now s/p C2-T2 PSF, C3-C6 Laminectmy, POD#10.

## 2022-11-18 NOTE — DIETITIAN INITIAL EVALUATION ADULT - NSFNSGIIOFT_GEN_A_CORE
Pt denies any n/v/d/c. Last BM 11/15, on aggressive bowel regimen (senna, miralax, dulcolax, and Mg hydroxide).

## 2022-11-18 NOTE — PROGRESS NOTE ADULT - SUBJECTIVE AND OBJECTIVE BOX
57M transferred from [facility] for evaluation and treatment of cervical myelopathy. He reports three years of right upper extremity weakness and gait unsteadiness after a fall onto back.  He denies seeing spine surgeon in past. No acute changes in RUE weakness or gait unsteadiness. Also reports new onset numbness L sole of foot. Denies pain/injury elsewhere. Denies tingling/paresthesia. Denies bowel/bladder incontinence. No saddle anesthesia. Denies fevers/chills. No other complaints at this time. Patient is scheduled for a PSF of C2-T2. Patient seen now resting comfortably. Has started participating with physical therapy. Patient states pain has been well managed. Hemovac removed today. Drainage from CIELO drain seems to have decreased        MEDICATIONS  (STANDING):  acetaminophen     Tablet .. 975 milliGRAM(s) Oral every 8 hours  chlorhexidine 2% Cloths 1 Application(s) Topical <User Schedule>  cyclobenzaprine 10 milliGRAM(s) Oral every 8 hours  gabapentin 100 milliGRAM(s) Oral three times a day  influenza   Vaccine 0.5 milliLiter(s) IntraMuscular once  pantoprazole    Tablet 40 milliGRAM(s) Oral before breakfast  polyethylene glycol 3350 17 Gram(s) Oral every 12 hours  senna 2 Tablet(s) Oral at bedtime    MEDICATIONS  (PRN):  benzocaine 15 mG/menthol 3.6 mG Lozenge 1 Lozenge Oral every 2 hours PRN Sore Throat  bisacodyl 5 milliGRAM(s) Oral every 12 hours PRN Constipation  diphenhydrAMINE 25 milliGRAM(s) Oral every 4 hours PRN Pruritus  magnesium hydroxide Suspension 30 milliLiter(s) Oral every 12 hours PRN Constipation  naloxone Injectable 0.1 milliGRAM(s) IV Push every 3 minutes PRN For ANY of the following changes in patient status:  A. RR LESS THAN 10 breaths per minute, B. Oxygen saturation LESS THAN 90%, C. Sedation score of 6  ondansetron Injectable 4 milliGRAM(s) IV Push every 6 hours PRN Nausea  oxyCODONE    IR 10 milliGRAM(s) Oral every 4 hours PRN Severe Pain (7 - 10)  oxyCODONE    IR 5 milliGRAM(s) Oral every 4 hours PRN Moderate Pain (4 - 6)  traMADol 50 milliGRAM(s) Oral every 8 hours PRN Mild Pain (1 - 3)          VITALS:   T(C): 36.8 (11-18-22 @ 16:22), Max: 37.1 (11-18-22 @ 00:05)  HR: 99 (11-18-22 @ 16:22) (90 - 106)  BP: 111/74 (11-18-22 @ 16:22) (100/66 - 125/83)  RR: 18 (11-18-22 @ 16:22) (18 - 18)  SpO2: 100% (11-18-22 @ 16:22) (98% - 100%)  Wt(kg): --    PHYSICAL EXAM:  GENERAL: NAD, well-groomed, well-developed  HEAD:  Atraumatic, Normocephalic  EYES: EOMI, PERRLA, conjunctiva and sclera clear  ENMT: No tonsillar erythema, exudates, or enlargement; Moist mucous membranes, Good dentition, No lesions  NECK: Supple, No JVD, Normal thyroid  NERVOUS SYSTEM:  Alert & Oriented X3, right sided weakness   CHEST/LUNG: Clear to percussion bilaterally; No rales, rhonchi, wheezing, or rubs  HEART: Regular rate and rhythm; No murmurs, rubs, or gallops  ABDOMEN: Soft, Nontender, Nondistended; Bowel sounds present  EXTREMITIES:  2+ Peripheral Pulses, No clubbing, cyanosis, or edema  LYMPH: No lymphadenopathy noted  SKIN: No rashes or lesions    LABS:        CBC Full  -  ( 18 Nov 2022 07:09 )  WBC Count : 10.93 K/uL  RBC Count : 3.49 M/uL  Hemoglobin : 9.8 g/dL  Hematocrit : 30.4 %  Platelet Count - Automated : 360 K/uL  Mean Cell Volume : 87.1 fl  Mean Cell Hemoglobin : 28.1 pg  Mean Cell Hemoglobin Concentration : 32.2 gm/dL  Auto Neutrophil # : x  Auto Lymphocyte # : x  Auto Monocyte # : x  Auto Eosinophil # : x  Auto Basophil # : x  Auto Neutrophil % : x  Auto Lymphocyte % : x  Auto Monocyte % : x  Auto Eosinophil % : x  Auto Basophil % : x    11-18    134<L>  |  101  |  18  ----------------------------<  91  4.3   |  28  |  0.83    Ca    8.2<L>      18 Nov 2022 07:09  Phos  3.3     11-18  Mg     1.9     11-18            CAPILLARY BLOOD GLUCOSE          RADIOLOGY & ADDITIONAL TESTS:

## 2022-11-18 NOTE — PROGRESS NOTE ADULT - SUBJECTIVE AND OBJECTIVE BOX
Orthopaedic Surgery   POD10 s/p C2-T2 PSF, C3-C6 Lami    Patient seen and examined at bedside. Pain well controlled with medication. Patient denies any numbness, tingling, weakness, or any other orthopaedic complaint. Denies N/V/CP/SOB.     Vital Signs Last 24 Hrs  T(C): 37.1 (18 Nov 2022 04:21), Max: 37.1 (17 Nov 2022 09:28)  T(F): 98.7 (18 Nov 2022 04:21), Max: 98.8 (18 Nov 2022 00:05)  HR: 101 (18 Nov 2022 04:21) (74 - 106)  BP: 102/70 (18 Nov 2022 04:21) (100/66 - 127/78)  BP(mean): --  RR: 18 (18 Nov 2022 04:21) (18 - 18)  SpO2: 100% (18 Nov 2022 04:21) (95% - 100%)    Parameters below as of 18 Nov 2022 04:21  Patient On (Oxygen Delivery Method): room air                          9.8    10.93 )-----------( 360      ( 18 Nov 2022 07:09 )             30.4         Physical Exam:    Gen: NAD  PE  General: Awake alert no acute distress   Spine:  C collar in place, dressing CDI  CIELO drain in place with SS output     Motor:                   C5                C6              C7               C8           T1   R            5/5                5/5            5/5             4+/5          4+/5  L             5/5               5/5            5/5             5/5          5/5                L2             L3             L4               L5            S1  R         5/5           5/5          5/5             5/5           5/5  L          5/5          5/5           5/5             5/5           5/5    Sensory:            C5         C6         C7      C8       T1        (0=absent, 1=impaired, 2=normal, NT=not testable)  R         2            2           2        2         2  L          2            2           2        2         2               L2          L3         L4      L5       S1         (0=absent, 1=impaired, 2=normal, NT=not testable)  R         2            2            2        2        2  L          2            2           2        2         2

## 2022-11-18 NOTE — DIETITIAN INITIAL EVALUATION ADULT - NSFNSADHERENCEPTAFT_GEN_A_CORE
No therapeutic diet followed PTA. HbA1c 5.5% (11/12), indicates adequate glycemic control. Current fingersticks WNL, not on insulin regimen.

## 2022-11-18 NOTE — DIETITIAN INITIAL EVALUATION ADULT - ENERGY INTAKE
Currently Pt reports good appetite, consuming >50% of most meals. Pt reporting some pain but states it is not affecting his appetite. No food preferences offered. Pt not amenable to oral nutrition supplements at this time. Fair (50-75%)

## 2022-11-18 NOTE — DIETITIAN INITIAL EVALUATION ADULT - PERTINENT LABORATORY DATA
11-18    134<L>  |  101  |  18  ----------------------------<  91  4.3   |  28  |  0.83    Ca    8.2<L>      18 Nov 2022 07:09  Phos  3.3     11-18  Mg     1.9     11-18    A1C with Estimated Average Glucose Result: 5.5 % (11-12-22 @ 05:48)  A1C with Estimated Average Glucose Result: 5.3 % (10-30-22 @ 06:30)

## 2022-11-18 NOTE — PROGRESS NOTE ADULT - SUBJECTIVE AND OBJECTIVE BOX
PHILOMENA HUGO   [id number]    Patient stable, tolerating diet, pain controlled on regimen.      T(C): 36.8 (11-18-22 @ 12:20), Max: 37.1 (11-18-22 @ 00:05)  HR: 90 (11-18-22 @ 12:20) (74 - 106)  BP: 125/83 (11-18-22 @ 12:20) (100/66 - 125/83)  RR: 18 (11-18-22 @ 12:20) (18 - 18)  SpO2: 100% (11-18-22 @ 12:20) (95% - 100%)  Wt(kg): --  NAD  Back: Top dressing removed. Incision with prineo in place c/d/i.  Soft.  No collection.  Drains in situ.  BLE: No calf tenderness.      11-17 @ 07:01  -  11-18 @ 07:00  --------------------------------------------------------  IN: 340 mL / OUT: 130 mL / NET: 210 mL    11-18 @ 07:01  -  11-18 @ 15:41  --------------------------------------------------------  IN: 640 mL / OUT: 420 mL / NET: 220 mL    CIELO: SS output  acetaminophen     Tablet .. 975 milliGRAM(s) Oral every 8 hours  benzocaine 15 mG/menthol 3.6 mG Lozenge 1 Lozenge Oral every 2 hours PRN  bisacodyl 5 milliGRAM(s) Oral every 12 hours PRN  chlorhexidine 2% Cloths 1 Application(s) Topical <User Schedule>  cyclobenzaprine 10 milliGRAM(s) Oral every 8 hours  diphenhydrAMINE 25 milliGRAM(s) Oral every 4 hours PRN  gabapentin 100 milliGRAM(s) Oral three times a day  influenza   Vaccine 0.5 milliLiter(s) IntraMuscular once  magnesium hydroxide Suspension 30 milliLiter(s) Oral every 12 hours PRN  naloxone Injectable 0.1 milliGRAM(s) IV Push every 3 minutes PRN  ondansetron Injectable 4 milliGRAM(s) IV Push every 6 hours PRN  oxyCODONE    IR 10 milliGRAM(s) Oral every 4 hours PRN  oxyCODONE    IR 5 milliGRAM(s) Oral every 4 hours PRN  pantoprazole    Tablet 40 milliGRAM(s) Oral before breakfast  polyethylene glycol 3350 17 Gram(s) Oral every 12 hours  senna 2 Tablet(s) Oral at bedtime  traMADol 50 milliGRAM(s) Oral every 8 hours PRN                            9.8    10.93 )-----------( 360      ( 18 Nov 2022 07:09 )             30.4     11-18    134<L>  |  101  |  18  ----------------------------<  91  4.3   |  28  |  0.83    Ca    8.2<L>      18 Nov 2022 07:09  Phos  3.3     11-18  Mg     1.9     11-18        A/P: S/P posterior spine fusion with muscle flap reconstruction.  - Diet, Pain control, PT  - OK to slowly remove dressing from hair as patient tolerates  - DVT PPx: SCD, chemoprophylaxis as per spine service  - Will Follow    Thank You  Marian Christianson MD  Plastic Surgery

## 2022-11-18 NOTE — DIETITIAN INITIAL EVALUATION ADULT - PERTINENT MEDS FT
MEDICATIONS  (STANDING):  acetaminophen     Tablet .. 975 milliGRAM(s) Oral every 8 hours  chlorhexidine 2% Cloths 1 Application(s) Topical <User Schedule>  cyclobenzaprine 10 milliGRAM(s) Oral every 8 hours  gabapentin 100 milliGRAM(s) Oral three times a day  influenza   Vaccine 0.5 milliLiter(s) IntraMuscular once  pantoprazole    Tablet 40 milliGRAM(s) Oral before breakfast  polyethylene glycol 3350 17 Gram(s) Oral every 12 hours  senna 2 Tablet(s) Oral at bedtime    MEDICATIONS  (PRN):  benzocaine 15 mG/menthol 3.6 mG Lozenge 1 Lozenge Oral every 2 hours PRN Sore Throat  bisacodyl 5 milliGRAM(s) Oral every 12 hours PRN Constipation  diphenhydrAMINE 25 milliGRAM(s) Oral every 4 hours PRN Pruritus  magnesium hydroxide Suspension 30 milliLiter(s) Oral every 12 hours PRN Constipation  naloxone Injectable 0.1 milliGRAM(s) IV Push every 3 minutes PRN For ANY of the following changes in patient status:  A. RR LESS THAN 10 breaths per minute, B. Oxygen saturation LESS THAN 90%, C. Sedation score of 6  ondansetron Injectable 4 milliGRAM(s) IV Push every 6 hours PRN Nausea  oxyCODONE    IR 5 milliGRAM(s) Oral every 4 hours PRN Moderate Pain (4 - 6)  oxyCODONE    IR 10 milliGRAM(s) Oral every 4 hours PRN Severe Pain (7 - 10)  traMADol 50 milliGRAM(s) Oral every 8 hours PRN Mild Pain (1 - 3)

## 2022-11-18 NOTE — DIETITIAN INITIAL EVALUATION ADULT - PERSON TAUGHT/METHOD
stressed importance of adequate protein and calorie intake to promote healing s/p surgery/verbal instruction/patient instructed

## 2022-11-18 NOTE — DIETITIAN INITIAL EVALUATION ADULT - ADD RECOMMEND
1) continue Regular diet  2) monitor need for oral nutrition supplements if Pt amenable  3) recommend MVI daily to promote healing  4) obtain current wt to assess wt trend  5) Monitor PO intake, weight, labs, skin, GI status, diet

## 2022-11-18 NOTE — DIETITIAN INITIAL EVALUATION ADULT - OTHER INFO
Reports UBW 61.4 kg  Current Dosing Wt: 63 kg (11/8)  Bed Wt 11/9 69.4 kg *question accuracy  [facility] HIE: 62.6 kg (10/30)

## 2022-11-19 ENCOUNTER — TRANSCRIPTION ENCOUNTER (OUTPATIENT)
Age: 57
End: 2022-11-19

## 2022-11-19 PROCEDURE — 97116 GAIT TRAINING THERAPY: CPT

## 2022-11-19 PROCEDURE — C1769: CPT

## 2022-11-19 PROCEDURE — 82435 ASSAY OF BLOOD CHLORIDE: CPT

## 2022-11-19 PROCEDURE — 84132 ASSAY OF SERUM POTASSIUM: CPT

## 2022-11-19 PROCEDURE — P9047: CPT

## 2022-11-19 PROCEDURE — 83036 HEMOGLOBIN GLYCOSYLATED A1C: CPT

## 2022-11-19 PROCEDURE — 85014 HEMATOCRIT: CPT

## 2022-11-19 PROCEDURE — 84295 ASSAY OF SERUM SODIUM: CPT

## 2022-11-19 PROCEDURE — 93005 ELECTROCARDIOGRAM TRACING: CPT

## 2022-11-19 PROCEDURE — 85027 COMPLETE CBC AUTOMATED: CPT

## 2022-11-19 PROCEDURE — C1889: CPT

## 2022-11-19 PROCEDURE — 36415 COLL VENOUS BLD VENIPUNCTURE: CPT

## 2022-11-19 PROCEDURE — 82803 BLOOD GASES ANY COMBINATION: CPT

## 2022-11-19 PROCEDURE — 97535 SELF CARE MNGMENT TRAINING: CPT

## 2022-11-19 PROCEDURE — 86850 RBC ANTIBODY SCREEN: CPT

## 2022-11-19 PROCEDURE — 84100 ASSAY OF PHOSPHORUS: CPT

## 2022-11-19 PROCEDURE — U0003: CPT

## 2022-11-19 PROCEDURE — 80048 BASIC METABOLIC PNL TOTAL CA: CPT

## 2022-11-19 PROCEDURE — 86803 HEPATITIS C AB TEST: CPT

## 2022-11-19 PROCEDURE — 71045 X-RAY EXAM CHEST 1 VIEW: CPT

## 2022-11-19 PROCEDURE — 82330 ASSAY OF CALCIUM: CPT

## 2022-11-19 PROCEDURE — U0005: CPT

## 2022-11-19 PROCEDURE — 82565 ASSAY OF CREATININE: CPT

## 2022-11-19 PROCEDURE — 86900 BLOOD TYPING SEROLOGIC ABO: CPT

## 2022-11-19 PROCEDURE — C9399: CPT

## 2022-11-19 PROCEDURE — 97530 THERAPEUTIC ACTIVITIES: CPT

## 2022-11-19 PROCEDURE — 85025 COMPLETE CBC W/AUTO DIFF WBC: CPT

## 2022-11-19 PROCEDURE — 97162 PT EVAL MOD COMPLEX 30 MIN: CPT

## 2022-11-19 PROCEDURE — 83735 ASSAY OF MAGNESIUM: CPT

## 2022-11-19 PROCEDURE — 85730 THROMBOPLASTIN TIME PARTIAL: CPT

## 2022-11-19 PROCEDURE — 85018 HEMOGLOBIN: CPT

## 2022-11-19 PROCEDURE — 97166 OT EVAL MOD COMPLEX 45 MIN: CPT

## 2022-11-19 PROCEDURE — 86901 BLOOD TYPING SEROLOGIC RH(D): CPT

## 2022-11-19 PROCEDURE — 82947 ASSAY GLUCOSE BLOOD QUANT: CPT

## 2022-11-19 PROCEDURE — 72020 X-RAY EXAM OF SPINE 1 VIEW: CPT

## 2022-11-19 PROCEDURE — 83605 ASSAY OF LACTIC ACID: CPT

## 2022-11-19 PROCEDURE — 85610 PROTHROMBIN TIME: CPT

## 2022-11-19 PROCEDURE — 97110 THERAPEUTIC EXERCISES: CPT

## 2022-11-19 PROCEDURE — 82962 GLUCOSE BLOOD TEST: CPT

## 2022-11-19 PROCEDURE — 80053 COMPREHEN METABOLIC PANEL: CPT

## 2022-11-19 PROCEDURE — C1713: CPT

## 2022-11-19 PROCEDURE — 76000 FLUOROSCOPY <1 HR PHYS/QHP: CPT

## 2022-11-19 RX ORDER — PANTOPRAZOLE SODIUM 20 MG/1
1 TABLET, DELAYED RELEASE ORAL
Qty: 0 | Refills: 0 | DISCHARGE
Start: 2022-11-19

## 2022-11-19 RX ORDER — SENNA PLUS 8.6 MG/1
2 TABLET ORAL
Qty: 0 | Refills: 0 | DISCHARGE
Start: 2022-11-19

## 2022-11-19 RX ORDER — GABAPENTIN 400 MG/1
1 CAPSULE ORAL
Qty: 0 | Refills: 0 | DISCHARGE
Start: 2022-11-19

## 2022-11-19 RX ORDER — OXYCODONE HYDROCHLORIDE 5 MG/1
1 TABLET ORAL
Qty: 50 | Refills: 0
Start: 2022-11-19

## 2022-11-19 RX ORDER — ACETAMINOPHEN 500 MG
3 TABLET ORAL
Qty: 0 | Refills: 0 | DISCHARGE
Start: 2022-11-19

## 2022-11-19 RX ORDER — CYCLOBENZAPRINE HYDROCHLORIDE 10 MG/1
1 TABLET, FILM COATED ORAL
Qty: 21 | Refills: 0
Start: 2022-11-19 | End: 2022-11-25

## 2022-11-19 RX ORDER — GABAPENTIN 400 MG/1
1 CAPSULE ORAL
Qty: 42 | Refills: 0
Start: 2022-11-19 | End: 2022-12-02

## 2022-11-19 RX ORDER — PANTOPRAZOLE SODIUM 20 MG/1
1 TABLET, DELAYED RELEASE ORAL
Qty: 30 | Refills: 0
Start: 2022-11-19 | End: 2022-12-18

## 2022-11-19 RX ORDER — OXYCODONE HYDROCHLORIDE 5 MG/1
1 TABLET ORAL
Qty: 0 | Refills: 0 | DISCHARGE
Start: 2022-11-19

## 2022-11-19 RX ORDER — POLYETHYLENE GLYCOL 3350 17 G/17G
17 POWDER, FOR SOLUTION ORAL
Qty: 0 | Refills: 0 | DISCHARGE
Start: 2022-11-19

## 2022-11-19 RX ADMIN — GABAPENTIN 100 MILLIGRAM(S): 400 CAPSULE ORAL at 05:40

## 2022-11-19 RX ADMIN — PANTOPRAZOLE SODIUM 40 MILLIGRAM(S): 20 TABLET, DELAYED RELEASE ORAL at 05:39

## 2022-11-19 RX ADMIN — Medication 975 MILLIGRAM(S): at 13:32

## 2022-11-19 RX ADMIN — CYCLOBENZAPRINE HYDROCHLORIDE 10 MILLIGRAM(S): 10 TABLET, FILM COATED ORAL at 05:39

## 2022-11-19 RX ADMIN — Medication 975 MILLIGRAM(S): at 14:32

## 2022-11-19 RX ADMIN — GABAPENTIN 100 MILLIGRAM(S): 400 CAPSULE ORAL at 13:32

## 2022-11-19 RX ADMIN — Medication 975 MILLIGRAM(S): at 05:39

## 2022-11-19 RX ADMIN — Medication 975 MILLIGRAM(S): at 06:09

## 2022-11-19 RX ADMIN — CYCLOBENZAPRINE HYDROCHLORIDE 10 MILLIGRAM(S): 10 TABLET, FILM COATED ORAL at 13:32

## 2022-11-19 NOTE — DISCHARGE NOTE PROVIDER - NSDCFUADDINST_GEN_ALL_CORE_FT
Dressing will be changed during office visit.   Out of bed, ambulate, weight bearing as tolerated-   Physical therapy to assist with exercise and help increase endurance.   Please contact Doctors office regarding arrangements for out patient Physical therapy

## 2022-11-19 NOTE — PROGRESS NOTE ADULT - SUBJECTIVE AND OBJECTIVE BOX
ORTHO  Patient is a 57y old  Male who presents with a chief complaint of Per chart, Pt is a 56 y/o M with PMH: cervical radiculopathy presents from [facility] for further evaluation. Now s/p C2-T2 PSF, C3-C6 Laminectmy, POD#10.     (18 Nov 2022 13:26)    Pt. resting without complaint    VS-  T(C): 36.9 (11-19-22 @ 08:00), Max: 37.7 (11-18-22 @ 23:57)  HR: 94 (11-19-22 @ 08:00) (90 - 110)  BP: 108/72 (11-19-22 @ 08:00) (104/65 - 129/75)  RR: 18 (11-19-22 @ 08:00) (18 - 18)  SpO2: 98% (11-19-22 @ 08:00) (97% - 100%)  Wt(kg): --    Gen: NAD  PE  General: Awake alert no acute distress   Spine:  C collar in place, dressing CDI  CIELO drain in place with SS output 70 cc/ shift  spontaneous tremor bilat UE  R>L  Motor:                   C5                C6              C7               C8           T1   R            5/5                5/5            4/5             4/5          4/5  L             4/5               4/5            4/5             4/5          4/5                L2             L3             L4               L5            S1  R         5/5           5/5          5/5             5/5           5/5  L          5/5          5/5           5/5             5/5           5/5    Sensory:            C5         C6         C7      C8       T1        (0=absent, 1=impaired, 2=normal, NT=not testable)  R         2            2           2        2         2  L          2            2           2        2         2               L2          L3         L4      L5       S1         (0=absent, 1=impaired, 2=normal, NT=not testable)  R         2            2            2        2        2  L          2            2           2        2         2                               9.8    10.93 )-----------( 360      ( 18 Nov 2022 07:09 )             30.4     11-18    134<L>  |  101  |  18  ----------------------------<  91  4.3   |  28  |  0.83    Ca    8.2<L>      18 Nov 2022 07:09  Phos  3.3     11-18  Mg     1.9     11-18

## 2022-11-19 NOTE — DISCHARGE NOTE PROVIDER - HOSPITAL COURSE
History of Present Illness:   57M transferred from [facility] for evaluation and treatment of cervical myelopathy. He reports three years of right upper extremity weakness and gait unsteadiness after a fall onto back.  He denies seeing spine surgeon in past. No acute changes in RUE weakness or gait unsteadiness. Also reports new onset numbness L sole of foot. Denies pain/injury elsewhere. Denies tingling/paresthesia. Denies bowel/bladder incontinence. No saddle anesthesia. Denies fevers/chills. No other complaints at this time.    11/6: Presents to the hospital with cervical myelopathy admitted and medically cleared for surgery.  11/8/22: Patient taken to surgery, underwent C3-C6 laminectomy, C2-T2 PSF  -tolerated procedure without complications.  Patient was evaluated by Physical therapy whom recommended home with home PT for discharge plan.  Patient is stable for discharge when cleared by PT.

## 2022-11-19 NOTE — PROGRESS NOTE ADULT - PROBLEM SELECTOR PROBLEM 1
Cervical radiculopathy

## 2022-11-19 NOTE — DISCHARGE NOTE PROVIDER - CARE PROVIDER_API CALL
Marco Casillas (MD)  [facility]  [address], [address]  [place] [address]  Phone: [phone]  Fax: [phone]  Follow Up Time:     Jos Hurtado [facility]  [address] [place] [address]  Phone: [phone]  Fax: [phone]  Follow Up Time:

## 2022-11-19 NOTE — PROGRESS NOTE ADULT - ASSESSMENT
57y Male s/p C2-T2 PSF, C3-C6 lami, POD8, recovering appropriately    Medical comanagement appreciated  - Pain control PRN  - FU AM labs  - WBAT  - PT/OT/OOB  - Incentive spirometry  - Monitor HMV/CIELO output, discontinue when appropriate  - DVT ppx: SCDs  - C Collar at all times  - Dispo Home per PT recs  -Will discuss with attending Dr. Casillas and advise if any changes to plan  
57y Male s/p C2-T2 PSF, C3-C6 lami, POD9, recovering appropriately    Medical comanagement appreciated  - Pain control PRN  - FU AM labs  - WBAT  - PT/OT/OOB  - Incentive spirometry  - Monitor CIELO output, discontinue when appropriate  - DVT ppx: SCDs  - C Collar at all times  - Dispo Home per PT recs  -Will discuss with attending Dr. Casillas and advise if any changes to plan
  Impression: Stable       Plan:   Continue present treatment                 Out of bed, ambulate as tolerated                 Physical therapy follow up                 Continue to monitor  
56 yo male with a hx of cervical radiculopathy presents for Posterior surgical fusion of C2-T2
56 yo male with a hx of cervical radiculopathy presents for Posterior surgical fusion of C2-T2
57y Male s/p C2-T2 PSF, C3-C6 lami, POD10, recovering appropriately    Medical comanagement appreciated  - Pain control PRN  - FU AM labs  - WBAT  - PT/OT/OOB  - Incentive spirometry  - Monitor CIELO output, discontinue when appropriate  - DVT ppx: SCDs  - C Collar at all times  - Dispo Home per PT recs  -Will discuss with attending Dr. Casillas and advise if any changes to plan
57y Male s/p C2-T2 PSF, C3-C6 lami, recovering appropriately    Medical comanagement appreciated  - Pain control PRN  - FU AM labs  - Decadron Taper  - WBAT  - PT/OT/OOB  - Incentive spirometry  - Monitor HMV/CIELO output, discontinue when appropriate  - DVT ppx: SCDs  - C Collar at all times  - Dispo Home per PT recs  -Will discuss with attending Dr. Casillas and advise if any changes to plan  
ASSESSMENT: 57yMale with PMHx cervical myelopathy s/p C2-T2 PSF, C3-6 Lami on 11/8. Admitted for SICU for dylan gtt, MAP goal >90    PLAN:   Neurologic:  AOx4  Multimodal pain management with PCA, Tylenol, Oxycodone, Tramadol, Gabapentin, Flexeril    Respiratory:  Saturation stable on RA    Cardiovascular:  Dylan gtt to maintain MAP >90  Titrate off as able  S/p 1L LR bolus total for elevated LA    Gastrointestinal/Nutrition:  Zofran PRN  Regular diet  Bowel regimen with Miralax, Senna, Dulcolax  Continue PPI    Genitourinary/Renal:  LR @120 cc/hr  Monitor UO    Hematologic:   No DVT ppx for now    Infectious Disease:  Ancef x 2 doses    Endocrine:  Decadron taper    
56 yo male with a hx of cervical radiculopathy presents for Posterior surgical fusion of C2-T2
57y Male s/p C2-T2 PSF, C3-C6 lami, recovering appropriately    Medical comanagement appreciated  - Pain control PRN  - FU AM labs  - Decadron Taper  - WBAT  - PT/OT/OOB  - Incentive spirometry  - Monitor HMV/CIELO output, discontinue when appropriate  - DVT ppx: SCDs  - C Collar at all times  - Dispo Home per PT recs  -Will discuss with attending Dr. Casillas and advise if any changes to plan  
57y Male s/p C2-T2 PSF, C3-C6 lami, recovering appropriately    Medical comanagement appreciated  - Pain control PRN  - FU AM labs  - Decadron Taper  - WBAT  - PT/OT/OOB  - Incentive spirometry  - Monitor HMV/CIELO output, discontinue when appropriate  - DVT ppx: SCDs  - C Collar at all times  - Dispo Home per PT recs  -Will discuss with attending Dr. Casillas and advise if any changes to plan  
ASSESSMENT: 57yMale with PMHx cervical myelopathy s/p C2-T2 PSF, C3-6 Lami on 11/8. Admitted for SICU for nisha gtt, MAP goal >90    PLAN:   Neurologic:  - AOx4  - Multimodal pain management with PCA, Tylenol, Oxycodone, Tramadol, Gabapentin, Flexeril  - q4 hour neurochecks     Respiratory:  - Saturation stable on RA    Cardiovascular:  - wean nisha as tolerated for MAP goal >65       Gastrointestinal/Nutrition:  - Zofran PRN  - Regular diet  - Bowel regimen with Miralax, Senna, Dulcolax  - Continue PPI    Genitourinary/Renal:  - IVL   - Monitor I/O     Hematologic:   - No DVT ppx for now    Infectious Disease:  - s/[ 24 hour Ancef postop     Endocrine:  - Decadron taper per ortho     
Patient s/p C2-T2 PSF, C3-C6 lami by ortho and local muscle flap closure by PRS on 11/8/22  - Keep dressing c/d/i  - HV and CIELO to remain in place until outputs decrease  - Remainder of care per ortho 
58 yo male with a hx of cervical radiculopathy presents for Posterior surgical fusion of C2-T2
56 yo male with a hx of cervical radiculopathy presents for Posterior surgical fusion of C2-T2
56 yo male with a hx of cervical radiculopathy presents for Posterior surgical fusion of C2-T2
58 yo male with a hx of cervical radiculopathy presents for Posterior surgical fusion of C2-T2
56 yo male with a hx of cervical radiculopathy presents for Posterior surgical fusion of C2-T2
58 yo male with a hx of cervical radiculopathy presents for Posterior surgical fusion of C2-T2
56 yo male with a hx of cervical radiculopathy presents for Posterior surgical fusion of C2-T2

## 2022-11-19 NOTE — DISCHARGE NOTE PROVIDER - NSDCFUADDAPPT_GEN_ALL_CORE_FT
Please call Dr. Casillas' s office within next few days to schedule a follow up appointment after surgery.    Recommend follow up with medical MD, within next 4 weeks.  Please call Dr. Casillas' s office within next few days to schedule a follow up appointment about 7 days  after surgery.    Recommend follow up with medical MD, within next 4 weeks.

## 2022-11-19 NOTE — PROGRESS NOTE ADULT - TIME BILLING
Discussed treatment plan with patient at bedside. continue ATC cardiopulmonary monitoring with frequent neuro checks   I am a non participating [employer] physician seeing Pt in coverage for Dr Rubio. The above patient documentation by Dr. Hurtado was reviewed and I agree with his evaluation, assessment and treatment plan.  Dion Rubio M.D.
Discussed treatment plan with patient at bedside.   I am a non participating [employer] physician seeing Pt in coverage for Dr Rubio. The above patient documentation by Dr. Hurtado was reviewed and I agree with his evaluation, assessment and treatment plan.  Dion Rubio M.D.
Discussed treatment plan with patient at bedside  Patient scheduled for DC home  I am a non participating [employer] physician seeing Pt in coverage for Dr Rubio. The above patient documentation by Dr. Hurtado was reviewed and I agree with his evaluation, assessment and treatment plan.  Dion Rubio M.D.
Discussed treatment plan with patient at bedside. continue ATC cardiopulmonary monitoring with frequent neuro checks   I am a non participating [employer] physician seeing Pt in coverage for Dr Rubio. The above patient documentation by Dr. Hurtado was reviewed and I agree with his evaluation, assessment and treatment plan.  Dion Rubio M.D.
Discussed treatment plan with patient at bedside.   I am a non participating [employer] physician seeing Pt in coverage for Dr Rubio. The above patient documentation by Dr. Hurtado was reviewed and I agree with his evaluation, assessment and treatment plan.  Dion Rubio M.D.
Discussed treatment plan with patient at bedside. continue ATC cardiopulmonary monitoring with frequent neuro checks   I am a non participating [employer] physician seeing Pt in coverage for Dr Rubio. The above patient documentation by Dr. Hurtado was reviewed and I agree with his evaluation, assessment and treatment plan.  Dion Rubio M.D.

## 2022-11-19 NOTE — PROGRESS NOTE ADULT - SUBJECTIVE AND OBJECTIVE BOX
57M transferred from [facility] for evaluation and treatment of cervical myelopathy. He reports three years of right upper extremity weakness and gait unsteadiness after a fall onto back.  He denies seeing spine surgeon in past. No acute changes in RUE weakness or gait unsteadiness. Also reports new onset numbness L sole of foot. Denies pain/injury elsewhere. Denies tingling/paresthesia. Denies bowel/bladder incontinence. No saddle anesthesia. Denies fevers/chills. No other complaints at this time. Patient is scheduled for a PSF of C2-T2. Patient seen now resting comfortably. Has started participating with physical therapy. Patient states pain has been well managed. Hemovac removed today. CIELO drain to be DCed today.       MEDICATIONS  (STANDING):  acetaminophen     Tablet .. 975 milliGRAM(s) Oral every 8 hours  chlorhexidine 2% Cloths 1 Application(s) Topical <User Schedule>  cyclobenzaprine 10 milliGRAM(s) Oral every 8 hours  gabapentin 100 milliGRAM(s) Oral three times a day  influenza   Vaccine 0.5 milliLiter(s) IntraMuscular once  pantoprazole    Tablet 40 milliGRAM(s) Oral before breakfast  polyethylene glycol 3350 17 Gram(s) Oral every 12 hours  senna 2 Tablet(s) Oral at bedtime    MEDICATIONS  (PRN):  benzocaine 15 mG/menthol 3.6 mG Lozenge 1 Lozenge Oral every 2 hours PRN Sore Throat  bisacodyl 5 milliGRAM(s) Oral every 12 hours PRN Constipation  diphenhydrAMINE 25 milliGRAM(s) Oral every 4 hours PRN Pruritus  magnesium hydroxide Suspension 30 milliLiter(s) Oral every 12 hours PRN Constipation  naloxone Injectable 0.1 milliGRAM(s) IV Push every 3 minutes PRN For ANY of the following changes in patient status:  A. RR LESS THAN 10 breaths per minute, B. Oxygen saturation LESS THAN 90%, C. Sedation score of 6  ondansetron Injectable 4 milliGRAM(s) IV Push every 6 hours PRN Nausea  oxyCODONE    IR 10 milliGRAM(s) Oral every 4 hours PRN Severe Pain (7 - 10)  oxyCODONE    IR 5 milliGRAM(s) Oral every 4 hours PRN Moderate Pain (4 - 6)          VITALS:   T(C): 36.9 (11-19-22 @ 08:00), Max: 37.7 (11-18-22 @ 23:57)  HR: 97 (11-19-22 @ 10:14) (94 - 110)  BP: 100/64 (11-19-22 @ 10:14) (100/64 - 129/75)  RR: 18 (11-19-22 @ 08:00) (18 - 18)  SpO2: 96% (11-19-22 @ 10:14) (96% - 100%)  Wt(kg): --    PHYSICAL EXAM:  GENERAL: NAD, well-groomed, well-developed  HEAD:  Atraumatic, Normocephalic  EYES: EOMI, PERRLA, conjunctiva and sclera clear  ENMT: No tonsillar erythema, exudates, or enlargement; Moist mucous membranes, Good dentition, No lesions  NECK: Supple, No JVD, Normal thyroid  NERVOUS SYSTEM:  Alert & Oriented X3, right sided weakness   CHEST/LUNG: Clear to percussion bilaterally; No rales, rhonchi, wheezing, or rubs  HEART: Regular rate and rhythm; No murmurs, rubs, or gallops  ABDOMEN: Soft, Nontender, Nondistended; Bowel sounds present  EXTREMITIES:  2+ Peripheral Pulses, No clubbing, cyanosis, or edema  LYMPH: No lymphadenopathy noted  SKIN: No rashes or lesions    LABS:        CBC Full  -  ( 18 Nov 2022 07:09 )  WBC Count : 10.93 K/uL  RBC Count : 3.49 M/uL  Hemoglobin : 9.8 g/dL  Hematocrit : 30.4 %  Platelet Count - Automated : 360 K/uL  Mean Cell Volume : 87.1 fl  Mean Cell Hemoglobin : 28.1 pg  Mean Cell Hemoglobin Concentration : 32.2 gm/dL  Auto Neutrophil # : x  Auto Lymphocyte # : x  Auto Monocyte # : x  Auto Eosinophil # : x  Auto Basophil # : x  Auto Neutrophil % : x  Auto Lymphocyte % : x  Auto Monocyte % : x  Auto Eosinophil % : x  Auto Basophil % : x    11-18    134<L>  |  101  |  18  ----------------------------<  91  4.3   |  28  |  0.83    Ca    8.2<L>      18 Nov 2022 07:09  Phos  3.3     11-18  Mg     1.9     11-18            CAPILLARY BLOOD GLUCOSE          RADIOLOGY & ADDITIONAL TESTS:

## 2022-11-19 NOTE — PROGRESS NOTE ADULT - PROBLEM SELECTOR PLAN 2
Pain meds as needed  follow for oversedation  GI regime to prevent constipation.
Pain meds as needed  follow for oversedation  GI regime to prevent constipation.
Pain meds as needed  Pain has been well managed  follow for oversedation  GI regime to prevent constipation.
Pain meds as needed  Pain has been well managed  follow for oversedation  GI regime to prevent constipation.
Pain meds as needed  follow for oversedation  GI regime to prevent constipation.
Pain meds as needed  follow for oversedation  GI regime to prevent constipation.
Pain meds as needed  Pain has been well managed  follow for oversedation  GI regime to prevent constipation.
Pain meds as needed  Pain has been well managed  follow for oversedation  GI regime to prevent constipation.
Pain meds as needed  follow for oversedation  GI regime to prevent constipation.
Pain meds as needed  Pain has been well managed  follow for oversedation  GI regime to prevent constipation.
Pain meds as needed  follow for oversedation  GI regime to prevent constipation.

## 2022-11-19 NOTE — DISCHARGE NOTE PROVIDER - NSDCMRMEDTOKEN_GEN_ALL_CORE_FT
acetaminophen 325 mg oral tablet: 3 tab(s) orally every 8 hours, As Needed  baclofen 5 mg oral tablet: 1 tab(s) orally every 8 hours, As needed, Musculoskeletal Pain  enoxaparin: 40 unit(s) subcutaneous once a day  gabapentin 100 mg oral capsule: 1 cap(s) orally 3 times a day  Occupational Therapy: Outpatient Occupational Therapy  oxyCODONE 5 mg oral tablet: 1 or 2tab(s) orally every 4 hours, as needed for moderate to severe pain  pantoprazole 40 mg oral delayed release tablet: 1 tab(s) orally once a day (before a meal)  Physical Therapy: Outpatient Physical Therapy  polyethylene glycol 3350 oral powder for reconstitution: 17 gram(s) orally every 12 hours, As Needed  senna leaf extract oral tablet: 2 tab(s) orally once a day (at bedtime)   acetaminophen 325 mg oral tablet: 3 tab(s) orally every 8 hours, As Needed  cyclobenzaprine 10 mg oral tablet: 1 tab(s) orally every 8 hours, As Needed  gabapentin 100 mg oral capsule: 1 cap(s) orally 3 times a day  oxyCODONE 5 mg oral tablet: 1 or 2tab(s) orally every 4 hours, as needed for moderate to severe pain MDD:8  pantoprazole 40 mg oral delayed release tablet: 1 tab(s) orally once a day (before a meal) MDD:1  polyethylene glycol 3350 oral powder for reconstitution: 17 gram(s) orally every 12 hours, As Needed  senna leaf extract oral tablet: 2 tab(s) orally once a day (at bedtime)

## 2022-11-19 NOTE — PROGRESS NOTE ADULT - PROVIDER SPECIALTY LIST ADULT
Anesthesia
Orthopedics
Plastic Surgery
Plastic Surgery
SICU
Internal Medicine
Orthopedics
Plastic Surgery
Orthopedics
SICU
Internal Medicine

## 2022-11-19 NOTE — DISCHARGE NOTE PROVIDER - NSDCCPTREATMENT_GEN_ALL_CORE_FT
PRINCIPAL PROCEDURE  Procedure: Fusion, spine, cervical, using posterolateral technique  Findings and Treatment:

## 2022-11-19 NOTE — PROGRESS NOTE ADULT - PROBLEM SELECTOR PLAN 3
found to have an elevate glucose on blood work  continue to follow glucose of BMP  if glucose remains elevated would start FS Qac and Qhs with an insulin sliding scale.
check HGA1C   continue FS Qac and Qhs with an insulin sliding scale.
HGA1C was 5.5  continue FS Qac and Qhs with an insulin sliding scale.  consistent carbohydrate diet
check HGA1C   continue FS Qac and Qhs with an insulin sliding scale.
HGA1C was 5.5  continue FS Qac and Qhs with an insulin sliding scale.  consistent carbohydrate diet
check HGA1C   continue FS Qac and Qhs with an insulin sliding scale.  consistent carbohydrate diet
found to have an elevate glucose on blood work  continue to follow glucose of BMP  if glucose remains elevated would start FS Qac and Qhs with an insulin sliding scale.
HGA1C was 5.5  continue FS Qac and Qhs with an insulin sliding scale.  consistent carbohydrate diet
found to have an elevate glucose on blood work  continue to follow glucose of BMP  if glucose remains elevated would start FS Qac and Qhs with an insulin sliding scale.
check HGA1C   continue FS Qac and Qhs with an insulin sliding scale.
found to have an elevate glucose on blood work  continue to follow glucose of BMP  if glucose remains elevated would start FS Qac and Qhs with an insulin sliding scale.
HGA1C was 5.5  continue FS Qac and Qhs with an insulin sliding scale.  consistent carbohydrate diet
check HGA1C   continue FS Qac and Qhs with an insulin sliding scale.  consistent carbohydrate diet

## 2022-11-19 NOTE — PROGRESS NOTE ADULT - PROBLEM SELECTOR PLAN 1
Patient scheduled for surgery 11/8  No contraindication to scheduled procedure  NPO the MN  DVT and GI prophylaxis.
DVT and GI prophylaxis.  physical therapy as tolerated  PO as tolerated  continue to monitor output from drains  continue cervical collar
DVT and GI prophylaxis.  Patient has been participating with physical therapy   PO as tolerated  continue to monitor output from drains  continue cervical collar
Patient seen post op  PO as per surgery  DVT and GI prophylaxis.  physical therapy as tolerated  continue pressors to keep MAP elevated  1 hour neuro checks
DVT and GI prophylaxis.  Patient has been participating with physical therapy   PO as tolerated  continue to monitor output from drain  Plastic surgery following  strength and function in UE has improved  continue cervical collar
DVT and GI prophylaxis.  Patient has been participating with physical therapy   PO as tolerated  continue to monitor output from drains  Unclear if the nurses are flushing the CIELO drain  continue cervical collar
DVT and GI prophylaxis.  Patient has been participating with physical therapy   PO as tolerated  CIELO drain to be DC today  Plastic surgery following  strength and function in UE has improved  continue cervical collar
Patient seen post op  PO as per surgery  DVT and GI prophylaxis.  physical therapy as tolerated
DVT and GI prophylaxis.  physical therapy as tolerated  PO as tolerated  continue to monitor output from drains  continue cervical collar
DVT and GI prophylaxis.  physical therapy as tolerated  continue pressors to keep MAP elevated  1 hour neuro checks  Patient tolerating PO
DVT and GI prophylaxis.  Patient has been participating with physical therapy   PO as tolerated  continue to monitor output from drain  strength and function in UE has improved  continue cervical collar
DVT and GI prophylaxis.  physical therapy as tolerated  PO as tolerated  continue to monitor output from drains
DVT and GI prophylaxis.  Patient has been participating with physical therapy   PO as tolerated  continue to monitor output from drain  Hemovac DCed  continue cervical collar

## 2022-11-19 NOTE — PROGRESS NOTE ADULT - PROBLEM SELECTOR PROBLEM 3
Elevated serum glucose

## 2022-11-20 VITALS
RESPIRATION RATE: 18 BRPM | SYSTOLIC BLOOD PRESSURE: 126 MMHG | OXYGEN SATURATION: 95 % | TEMPERATURE: 99 F | DIASTOLIC BLOOD PRESSURE: 81 MMHG | HEART RATE: 80 BPM

## 2022-11-30 ENCOUNTER — APPOINTMENT (OUTPATIENT)
Dept: ORTHOPEDIC SURGERY | Facility: CLINIC | Age: 57
End: 2022-11-30

## 2022-11-30 VITALS
SYSTOLIC BLOOD PRESSURE: 114 MMHG | DIASTOLIC BLOOD PRESSURE: 76 MMHG | WEIGHT: 140 LBS | BODY MASS INDEX: 23.32 KG/M2 | HEIGHT: 65 IN

## 2022-11-30 PROCEDURE — 99024 POSTOP FOLLOW-UP VISIT: CPT

## 2022-11-30 PROCEDURE — 72040 X-RAY EXAM NECK SPINE 2-3 VW: CPT

## 2022-11-30 NOTE — ASSESSMENT
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  At this point he should continue to ambulate and do his actives of daily living.  I like to keep the cervical collar on for another 2 weeks.  I have encouraged him to continue doing as many activities as he can by refraining from pushing pulling carrying things of significant weight.  He will follow-up with plastic surgery in the near future.  All questions were answered.

## 2022-11-30 NOTE — HISTORY OF PRESENT ILLNESS
[de-identified] : This is a 57-year-old male that is now approximately 22 days out from a C2-T2 posterior cervical spinal fusion.  Overall he has done remarkably well.  He is walking.  His hand strength has improved.  He feels more steady on his feet.  He denies any issues in terms of bowel bladder function or saddle anesthesia.  Overall he has already noted a significant improvement in his ability to perform his activities of daily living as well.

## 2022-12-14 ENCOUNTER — APPOINTMENT (OUTPATIENT)
Dept: ORTHOPEDIC SURGERY | Facility: CLINIC | Age: 57
End: 2022-12-14

## 2022-12-14 VITALS
HEIGHT: 65 IN | WEIGHT: 140 LBS | SYSTOLIC BLOOD PRESSURE: 118 MMHG | DIASTOLIC BLOOD PRESSURE: 76 MMHG | BODY MASS INDEX: 23.32 KG/M2

## 2022-12-14 PROCEDURE — 72040 X-RAY EXAM NECK SPINE 2-3 VW: CPT

## 2022-12-14 PROCEDURE — 99024 POSTOP FOLLOW-UP VISIT: CPT

## 2022-12-14 RX ORDER — TIZANIDINE 2 MG/1
2 TABLET ORAL EVERY 6 HOURS
Qty: 56 | Refills: 0 | Status: ACTIVE | COMMUNITY
Start: 2022-12-14 | End: 1900-01-01

## 2022-12-14 RX ORDER — IBUPROFEN 600 MG/1
600 TABLET, FILM COATED ORAL 3 TIMES DAILY
Qty: 30 | Refills: 0 | Status: ACTIVE | COMMUNITY
Start: 2022-12-14 | End: 1900-01-01

## 2022-12-14 RX ORDER — ACETAMINOPHEN 500 MG/1
500 TABLET ORAL
Qty: 30 | Refills: 0 | Status: ACTIVE | COMMUNITY
Start: 2022-12-14 | End: 1900-01-01

## 2022-12-14 NOTE — HISTORY OF PRESENT ILLNESS
[de-identified] : This is a 57-year-old male who is now approximately 5 weeks out from surgery.  Overall he has done well.  He still notes improvements in his hand dexterity and balance.  He does have some mid back pain.  He denies any radicular pain.  He is anxious to start physical therapy to help with his activity level.\par \par 11/30/22\par This is a 57-year-old male that is now approximately 22 days out from a C2-T2 posterior cervical spinal fusion.  Overall he has done remarkably well.  He is walking.  His hand strength has improved.  He feels more steady on his feet.  He denies any issues in terms of bowel bladder function or saddle anesthesia.  Overall he has already noted a significant improvement in his ability to perform his activities of daily living as well.

## 2022-12-14 NOTE — ASSESSMENT
[FreeTextEntry1] : This is a 57-year-old male that presented approximately 5 and half weeks ago with severe clinical complaints consistent with cervical spondylotic myelopathy, this was an advanced case.  He underwent a C2-T2 posterior cervical fusion and overall he has done well since his surgery.  At this point I would encourage him to work with physical therapy to increase his functionality. I will have him follow-up in 4 weeks time.  All questions were answered today.

## 2022-12-14 NOTE — PHYSICAL EXAM
[de-identified] : Cervical Physical Exam\par \par Gait -slightly antalgic\par \par Station - Normal\par \par Sagittal balance - Normal \par \par Compensatory mechanism? - None\par \par Horizontal gaze - Maintained\par \par \par Reflexes\par Biceps -hyperreflexic\par Triceps - Normal\par Brachioradialis -hyperreflexic\par Patellar - Normal\par Gastroc - Normal\par \par Shelton´s -positive\par \par Shoulder Exam - Normal\par \par Spurling´s - None\par \par Wrist Pulses -2+ radial/ulnar\par \par Foot Pulses -2+ DP/PT\par \par Cervical range of motion -decreased but can turn had approximately 15 degrees bilaterally\par \par Sensation \par C5-T1 sensation intact to light touch bilaterally\par \par L1-S1 sensation intact to light touch bilaterally\par \par Motor\par \par \par 	Deltoid	Biceps	Triceps	WF	WE	IO	\par Right	4/5	5/5	5/5	5/5	5/5	3/5	3/5\par Left	5/5	5/5	5/5	5/5	5/5	5/5	5/5\par \par As compared to prior to surgery the patient's overall exam has dramatically improved, December 14, 2022\par \par Incision is healed\par \par There is a seroma present, wound is healed, wound is not painful\par 	IP	Quad	HS	TA	Gastroc	EHL\par Right	5/5	5/5	5/5	5/5	5/5	5/5\par Left	5/5	5/5	5/5	5/5	5/5	5/5 [de-identified] : Cervical radiographs\par Hardware in appropriate position\par No acute complication

## 2023-01-06 ENCOUNTER — INPATIENT (INPATIENT)
Facility: HOSPITAL | Age: 58
LOS: 3 days | Discharge: HOME HEALTH | DRG: 921 | End: 2023-01-10
Attending: INTERNAL MEDICINE | Admitting: INTERNAL MEDICINE
Payer: COMMERCIAL

## 2023-01-06 VITALS
OXYGEN SATURATION: 100 % | WEIGHT: 139.99 LBS | TEMPERATURE: 98 F | SYSTOLIC BLOOD PRESSURE: 179 MMHG | HEIGHT: 62 IN | DIASTOLIC BLOOD PRESSURE: 108 MMHG | HEART RATE: 90 BPM | RESPIRATION RATE: 20 BRPM

## 2023-01-06 PROCEDURE — 99285 EMERGENCY DEPT VISIT HI MDM: CPT

## 2023-01-06 NOTE — ED ADULT TRIAGE NOTE - CHIEF COMPLAINT QUOTE
had spinal surgery in november of 2022. has been having recurrent seroma that needs drain placed, sent in for IR. last drainage 10 days ago. denies numbness/tingling in extremities

## 2023-01-07 DIAGNOSIS — G62.9 POLYNEUROPATHY, UNSPECIFIED: ICD-10-CM

## 2023-01-07 DIAGNOSIS — L76.34 POSTPROCEDURAL SEROMA OF SKIN AND SUBCUTANEOUS TISSUE FOLLOWING OTHER PROCEDURE: ICD-10-CM

## 2023-01-07 DIAGNOSIS — R52 PAIN, UNSPECIFIED: ICD-10-CM

## 2023-01-07 DIAGNOSIS — T88.8XXA OTHER SPECIFIED COMPLICATIONS OF SURGICAL AND MEDICAL CARE, NOT ELSEWHERE CLASSIFIED, INITIAL ENCOUNTER: ICD-10-CM

## 2023-01-07 LAB
ALBUMIN SERPL ELPH-MCNC: 4.3 G/DL — SIGNIFICANT CHANGE UP (ref 3.3–5)
ALP SERPL-CCNC: 89 U/L — SIGNIFICANT CHANGE UP (ref 40–120)
ALT FLD-CCNC: 19 U/L — SIGNIFICANT CHANGE UP (ref 10–45)
ANION GAP SERPL CALC-SCNC: 13 MMOL/L — SIGNIFICANT CHANGE UP (ref 5–17)
APTT BLD: 27.4 SEC — LOW (ref 27.5–35.5)
AST SERPL-CCNC: 21 U/L — SIGNIFICANT CHANGE UP (ref 10–40)
BASOPHILS # BLD AUTO: 0.01 K/UL — SIGNIFICANT CHANGE UP (ref 0–0.2)
BASOPHILS NFR BLD AUTO: 0.2 % — SIGNIFICANT CHANGE UP (ref 0–2)
BILIRUB SERPL-MCNC: 0.2 MG/DL — SIGNIFICANT CHANGE UP (ref 0.2–1.2)
BLD GP AB SCN SERPL QL: NEGATIVE — SIGNIFICANT CHANGE UP
BUN SERPL-MCNC: 18 MG/DL — SIGNIFICANT CHANGE UP (ref 7–23)
CALCIUM SERPL-MCNC: 9.1 MG/DL — SIGNIFICANT CHANGE UP (ref 8.4–10.5)
CHLORIDE SERPL-SCNC: 100 MMOL/L — SIGNIFICANT CHANGE UP (ref 96–108)
CO2 SERPL-SCNC: 23 MMOL/L — SIGNIFICANT CHANGE UP (ref 22–31)
CREAT SERPL-MCNC: 0.9 MG/DL — SIGNIFICANT CHANGE UP (ref 0.5–1.3)
CRP SERPL-MCNC: <3 MG/L — SIGNIFICANT CHANGE UP (ref 0–4)
EGFR: 100 ML/MIN/1.73M2 — SIGNIFICANT CHANGE UP
EOSINOPHIL # BLD AUTO: 0.12 K/UL — SIGNIFICANT CHANGE UP (ref 0–0.5)
EOSINOPHIL NFR BLD AUTO: 2.1 % — SIGNIFICANT CHANGE UP (ref 0–6)
ERYTHROCYTE [SEDIMENTATION RATE] IN BLOOD: 34 MM/HR — HIGH (ref 0–20)
GLUCOSE SERPL-MCNC: 135 MG/DL — HIGH (ref 70–99)
HCT VFR BLD CALC: 35.4 % — LOW (ref 39–50)
HGB BLD-MCNC: 11.1 G/DL — LOW (ref 13–17)
IMM GRANULOCYTES NFR BLD AUTO: 0.2 % — SIGNIFICANT CHANGE UP (ref 0–0.9)
INR BLD: 1.09 RATIO — SIGNIFICANT CHANGE UP (ref 0.88–1.16)
LYMPHOCYTES # BLD AUTO: 2.54 K/UL — SIGNIFICANT CHANGE UP (ref 1–3.3)
LYMPHOCYTES # BLD AUTO: 45 % — HIGH (ref 13–44)
MCHC RBC-ENTMCNC: 24.5 PG — LOW (ref 27–34)
MCHC RBC-ENTMCNC: 31.4 GM/DL — LOW (ref 32–36)
MCV RBC AUTO: 78.1 FL — LOW (ref 80–100)
MONOCYTES # BLD AUTO: 0.8 K/UL — SIGNIFICANT CHANGE UP (ref 0–0.9)
MONOCYTES NFR BLD AUTO: 14.2 % — HIGH (ref 2–14)
NEUTROPHILS # BLD AUTO: 2.17 K/UL — SIGNIFICANT CHANGE UP (ref 1.8–7.4)
NEUTROPHILS NFR BLD AUTO: 38.3 % — LOW (ref 43–77)
NRBC # BLD: 0 /100 WBCS — SIGNIFICANT CHANGE UP (ref 0–0)
PLATELET # BLD AUTO: 291 K/UL — SIGNIFICANT CHANGE UP (ref 150–400)
POTASSIUM SERPL-MCNC: 3.6 MMOL/L — SIGNIFICANT CHANGE UP (ref 3.5–5.3)
POTASSIUM SERPL-SCNC: 3.6 MMOL/L — SIGNIFICANT CHANGE UP (ref 3.5–5.3)
PROT SERPL-MCNC: 7.1 G/DL — SIGNIFICANT CHANGE UP (ref 6–8.3)
PROTHROM AB SERPL-ACNC: 12.6 SEC — SIGNIFICANT CHANGE UP (ref 10.5–13.4)
RBC # BLD: 4.53 M/UL — SIGNIFICANT CHANGE UP (ref 4.2–5.8)
RBC # FLD: 14.6 % — HIGH (ref 10.3–14.5)
RH IG SCN BLD-IMP: POSITIVE — SIGNIFICANT CHANGE UP
SARS-COV-2 RNA SPEC QL NAA+PROBE: SIGNIFICANT CHANGE UP
SODIUM SERPL-SCNC: 136 MMOL/L — SIGNIFICANT CHANGE UP (ref 135–145)
WBC # BLD: 5.65 K/UL — SIGNIFICANT CHANGE UP (ref 3.8–10.5)
WBC # FLD AUTO: 5.65 K/UL — SIGNIFICANT CHANGE UP (ref 3.8–10.5)

## 2023-01-07 PROCEDURE — 72040 X-RAY EXAM NECK SPINE 2-3 VW: CPT | Mod: 26

## 2023-01-07 PROCEDURE — 72070 X-RAY EXAM THORAC SPINE 2VWS: CPT | Mod: 26

## 2023-01-07 PROCEDURE — 72125 CT NECK SPINE W/O DYE: CPT | Mod: 26

## 2023-01-07 RX ORDER — INFLUENZA VIRUS VACCINE 15; 15; 15; 15 UG/.5ML; UG/.5ML; UG/.5ML; UG/.5ML
0.5 SUSPENSION INTRAMUSCULAR ONCE
Refills: 0 | Status: DISCONTINUED | OUTPATIENT
Start: 2023-01-07 | End: 2023-01-10

## 2023-01-07 RX ORDER — ACETAMINOPHEN 500 MG
650 TABLET ORAL EVERY 6 HOURS
Refills: 0 | Status: DISCONTINUED | OUTPATIENT
Start: 2023-01-07 | End: 2023-01-10

## 2023-01-07 RX ORDER — POLYETHYLENE GLYCOL 3350 17 G/17G
17 POWDER, FOR SOLUTION ORAL EVERY 12 HOURS
Refills: 0 | Status: DISCONTINUED | OUTPATIENT
Start: 2023-01-07 | End: 2023-01-10

## 2023-01-07 RX ORDER — OXYCODONE HYDROCHLORIDE 5 MG/1
5 TABLET ORAL EVERY 4 HOURS
Refills: 0 | Status: DISCONTINUED | OUTPATIENT
Start: 2023-01-07 | End: 2023-01-10

## 2023-01-07 RX ORDER — ACETAMINOPHEN 500 MG
650 TABLET ORAL EVERY 6 HOURS
Refills: 0 | Status: DISCONTINUED | OUTPATIENT
Start: 2023-01-07 | End: 2023-01-07

## 2023-01-07 RX ORDER — ACETAMINOPHEN 500 MG
975 TABLET ORAL ONCE
Refills: 0 | Status: COMPLETED | OUTPATIENT
Start: 2023-01-07 | End: 2023-01-07

## 2023-01-07 RX ADMIN — OXYCODONE HYDROCHLORIDE 5 MILLIGRAM(S): 5 TABLET ORAL at 12:01

## 2023-01-07 RX ADMIN — Medication 975 MILLIGRAM(S): at 13:56

## 2023-01-07 RX ADMIN — POLYETHYLENE GLYCOL 3350 17 GRAM(S): 17 POWDER, FOR SOLUTION ORAL at 12:02

## 2023-01-07 RX ADMIN — Medication 650 MILLIGRAM(S): at 21:16

## 2023-01-07 NOTE — H&P ADULT - ASSESSMENT
56 yo male presents after recent c spine surgery. Patient has a recurrent seroma at the surgical site. Sent in by plastic surgery for placement of a drain by IR. Patient seen now with complaint of sever pain

## 2023-01-07 NOTE — CONSULT NOTE ADULT - SUBJECTIVE AND OBJECTIVE BOX
HPI: Pt is a 57y s/p C2-T2 PSF, C3-6 lami on 11/8/22 w/ Dr. Casillas for chronc c5 ant wedge vcf w/ retropulsion and CSp myelopathy, w/ incision/wound managed per plastic surgery, Dr. Marian Christianson, who was sent in by Plastic surgery team, Dr. Christianson for seroma drainage with IR and placement of a drain. Patient states his recovery is going well, recently transitioned to a cane from a walker. States he has recurrent seromas, drained 5x times in the office prior to being sent in for IR procedure. Denies pain down legs, denies paresthesias, denies incontinence of bowel or bladder. Reports "tingling" in his b/l finger tips. No falls or trauma. Denies persistent headaches or headache when sitting up from a flay position. Denies unsteady gait. Reports hx of fumbling fingers / difficulty with buttons from known myopathy in the cervical spine.    PAST MEDICAL & SURGICAL HISTORY:  Chronic back pain      History of contracture of joint      No significant past surgical history          FAMILY HISTORY:  No significant family history (Father, Mother)        SOCIAL HISTORY:     Vital Signs Last 24 Hrs  T(C): 36.9 (07 Jan 2023 00:25), Max: 36.9 (07 Jan 2023 00:25)  T(F): 98.5 (07 Jan 2023 00:25), Max: 98.5 (07 Jan 2023 00:25)  HR: 86 (07 Jan 2023 00:25) (86 - 90)  BP: 110/72 (07 Jan 2023 00:25) (110/72 - 179/108)  BP(mean): --  RR: 19 (07 Jan 2023 00:25) (19 - 20)  SpO2: 100% (07 Jan 2023 00:25) (100% - 100%)    Parameters below as of 07 Jan 2023 00:25  Patient On (Oxygen Delivery Method): room air      I&O's Detail      LABS:                        11.1   5.65  )-----------( 291      ( 07 Jan 2023 00:37 )             35.4                 RADIOLOGY & ADDITIONAL STUDIES:    PHYSICAL EXAM:  General; Awake and alert, Oriented x 3  Hips/Pelvis:   Able to SLR bilaterally. Negative log roll, heel strike. No pain on passive Int/Ext hip rotation.  Spine exam:  Neck: Bogginess and collection around the cervical spine, ballotable  Mildly tender  Full Painless baseline AROM  Back:     PHYSICAL EXAM  GEN: NAD, AAOx3    SPINE:  Skin intact, incision midline well healed  Tender with deep palpation of the collection  NTTP over the bony prominences of the cervical / thoracic / lumbar / sacral spine  No Paraspinal TTP of the cervical / thoracic / lumbar / sacral spine  No bony step-offs Grossly moving all extremities  + Radial Pulse  + DP/PT Pulses        MOTOR EXAM:                          Elbow Flex (C5)     Wrist Ext (C6)     Elbow Ext (C7)      Finger Flex (C8)    Finger Abduction (T1)  RIGHT                 4/5                         4/5                         4/5                          4/5                              4/5  LEFT                    5/5                         5/5                         5/5                          5/5                              5/5                           Hip Flex (L2)      Knee Ext (L3)      Ank Dorsiflex (L4)     Hallux Ext (L5)     Ank PlantarFlex (S1)  RIGHT                4/5                         4/5                         4/5                          4/5                              4/5  LEFT                  5/5                      5/5                          5/5                            5/5                           5/5      SENSORY EXAM:                        C5      C6      C7      C8       T1          RIGHT          1         1        1         1         1          (0=absent, 1=impaired, 2=normal, NT=not testable)  LEFT             2         2        2         2         2          (0=absent, 1=impaired, 2=normal, NT=not testable)                        L2        L3       L4      L5       S1          RIGHT        1          1         1        1        1           (0=absent, 1=impaired, 2=normal, NT=not testable)  LEFT           2          2         2        2        2           (0=absent, 1=impaired, 2=normal, NT=not testable)    **Not acute neurological deficits, known from known cervical myelopathy     Negative Shelton's sign bilaterally  Negative Babinski bilaterally   Negative Myoclonus bilaterally        A/P :  57M w/ seroma s/p C2-T2 PSF, C3-6 lami on 11/8/22 w/ Dr. Casillas for chronc c5 ant wedge vcf w/ retropulsion and CSp myelopathy, w/ incision/wound managed per plastic surgery, Dr. Marian Christianson      -Sent in by PSx team for seroma drainage with IR and drain placement   -No s/sx of acute cord compression or cauda equina, no signs / sx of CSF leak on exam   -No acute orthopaedic surgical intervention indicated  -Can obtain CT CSp and XR CSp for clinical follow up and consideration of IR drain per PSx plan according to documentation from office patient has with him  -Would pre op/make NPO for possible IR intervention  -PT/OT  -DVT ppx would hold at midnight for possible procedure with IR  -No plan for orthopaedic surgical intervention at present  -WBAT  
Interventional Radiology    HPI: 57y s/p C2-T2 PSF, C3-6 lami on 11/8/22 w/ Dr. Casillas for chronc c5 ant wedge vcf w/ retropulsion and CSp myelopathy, w/ incision/wound managed per plastic surgery, Dr. Marian Christianson, who was sent in by Plastic surgery team, Dr. Christianson for seroma drainage with IR and placement of a drain. Patient states his recovery is going well, recently transitioned to a cane from a walker. States he has recurrent seromas, drained 5x times in the office prior to being sent in for IR procedure. Denies pain down legs, denies paresthesias, denies incontinence of bowel or bladder. Reports "tingling" in his b/l finger tips. No falls or trauma. Denies persistent headaches or headache when sitting up from a flay position. Denies unsteady gait. Reports hx of fumbling fingers / difficulty with buttons from known myopathy in the cervical spine.    Allergies:   Medications (Abx/Cardiac/Anticoagulation/Blood Products)      Data:  157.5  63.5  T(C): 36.8  HR: 84  BP: 107/71  RR: 18  SpO2: 100%    LABS:                          11.1   5.65  )-----------( 291      ( 07 Jan 2023 00:37 )             35.4     01-07    136  |  100  |  18  ----------------------------<  135<H>  3.6   |  23  |  0.90    Ca    9.1      07 Jan 2023 00:37    TPro  7.1  /  Alb  4.3  /  TBili  0.2  /  DBili  x   /  AST  21  /  ALT  19  /  AlkPhos  89  01-07    PT/INR - ( 07 Jan 2023 00:37 )   PT: 12.6 sec;   INR: 1.09 ratio         PTT - ( 07 Jan 2023 00:37 )  PTT:27.4 sec    Radiology: ct cervical spine reviewed    Assessment/Plan: 57y s/p C2-T2 PSF, C3-6 lami on 11/8/22 with seroma s/p multiple drainages as an outpatient now p/w superficial seroma on CT.     -- IR will plan to perform Monday 1/9  --Patient can be discharged and follow up with IR as outpatient as well. Otherwise, will plan for Monday.   -- NPO at midnight on 1/8  -- Patient not on AC  -- AM labs 1/9  -- please place IR procedure request order under Dr. Haliabeh Ethan Davoudzadeh MD   Interventional Radiology PGY 4  Available on [employer] TEAMS    For EMERGENT inquiries/questions:  IR Pager ([facility]): [phone]  IR Pager ([facility]): [phone] ; [phone]    For non-emergent consults/questions:   Please place a sunrise order "Consult- Interventional Radiology" with an appropriate callback number    For questions about scheduling during appropriate work hours, call IR :  [facility]: [phone]  [facility]: [phone]    For outpatient IR booking:  [facility]: [phone]  [facility]: [phone]

## 2023-01-07 NOTE — CONSULT NOTE ADULT - ATTENDING COMMENTS
57y s/p C2-T2 PSF, C3-6 lami on 11/8/22 with seroma s/p multiple drainages as an outpatient now p/w superficial seroma on CT.  Plan for seroma drain on Monday.

## 2023-01-07 NOTE — ED PROVIDER NOTE - ATTENDING CONTRIBUTION TO CARE
MD Arnold:  patient seen and evaluated with the resident.  I was present for key portions of the History & Physical, and I agree with the Impression & Plan.  MD Arnold: 57-year-old male, sent to the ED by spine surgeon for IR drainage of a spinal seroma.  Patient had cervical laminectomy decompression by Dr. Casillas 2 months ago and has subsequently developed a fluctuance underneath the surgical scars.    Associated symptoms: No fever, no chills.  Positive hand paresthesias.  No weakness, no incontinence.  Vital signs: Hypertension appreciated otherwise unremarkable  General: Adult male no acute distress  Spine: Cervical spine scar appreciated with palpable fluctuance under the scar.  Minimally tender  Head:  NC/AT.  Resp: No distress.  Ext: no deformities.  Skin: warm and dry as visualized.  Neuro: alert and oriented to person, place, time.  Impression: History and physical concerning for postoperative seroma, complaints of paresthesias warrants drainage procedure.  Plan patient will need admission, and preoperative work-up prior to IR drainage of these fluid collections.  Will discuss with spine surgery. MD Arnold:  patient seen and evaluated with the resident.  I was present for key portions of the History & Physical, and I agree with the Impression & Plan.  MD Arnold: 57-year-old male, sent to the ED by spine surgeon for IR drainage of a spinal seroma.  Patient had cervical laminectomy decompression by Dr. Casillas 2 months ago and has subsequently developed a fluctuance underneath the surgical scars.    Associated symptoms: No fever, no chills.  Positive hand paresthesias.  No weakness, no incontinence.  Vital signs: Hypertension appreciated otherwise unremarkable  General: Adult male no acute distress  Spine: Cervical spine scar appreciated with palpable fluctuance under the scar.  Minimally tender  Head:  NC/AT.  Resp: No distress.  Ext: no deformities.  Skin: warm and dry as visualized.  Neuro: alert and oriented to person, place, time.  Impression: History and physical concerning for postoperative seroma of the spine, complaints of paresthesias warrants drainage procedure on an inpatient basis, given his described paraesthesias of his hands.  Laboratory evaluation necessary prior to any surgical procedure.  Plan patient will need admission, and preoperative work-up (labs, ECG) prior to IR drainage of these fluid collections.  Spine surgery consulted.

## 2023-01-07 NOTE — H&P ADULT - PROBLEM SELECTOR PLAN 2
Patient with continue weakness in the upper extremities  continue  physical therapy as tolerated  pain meds as needed

## 2023-01-07 NOTE — ED ADULT NURSE NOTE - OBJECTIVE STATEMENT
57y Male AOx4 with PMH of spinal surgery (Nov. 2022) presents to the ED via walk-in for IR. Pt states since he had the spinal surgery he has recurring "fluid bubbles" around left shoulder blade. Has had fluid drained x5, last drainage 10 days ago, was sent in by surgeon for IR placement of "tube", pt unsure of exact procedure. Reports when the drainage comes back it is painful. Has been taking ibuprofen and oxycodone with some relief. Uses cane to ambulate with no difficulty, no recent falls. Denies numbness/tingling in extremities, able to void and pass BMs without difficulty, N/V, fever/chills, SOB, chest pain. Spontaneous/unlabored respirations, speaking in full sentences. Side rails up, bed in lowest position, safety maintained.

## 2023-01-07 NOTE — H&P ADULT - HISTORY OF PRESENT ILLNESS
Pt is a 57y s/p C2-T2 PSF, C3-6 lami on 11/8/22 w/ Dr. Casillas for chronc c5 ant wedge vcf w/ retropulsion and CSp myelopathy, w/ incision/wound managed per plastic surgery, Dr. aMrian Christianson, who was sent in by Plastic surgery team, Dr. Christianson for seroma drainage with IR and placement of a drain. Patient states his recovery is going well, recently transitioned to a cane from a walker. States he has recurrent seromas, drained 5x times in the office prior to being sent in for IR procedure. Denies pain down legs, denies paresthesias, denies incontinence of bowel or bladder. Reports "tingling" in his b/l finger tips. No falls or trauma. Denies persistent headaches or headache when sitting up from a flat position. Denies unsteady gait. Reports hx of fumbling fingers / difficulty with buttons from known myopathy in the cervical spine.   Pt is a 57y s/p C2-T2 PSF, C3-6 lami on 11/8/22 w/ Dr. Casillas for chronc c5 ant wedge vcf w/ retropulsion and CSp myelopathy, w/ incision/wound managed per plastic surgery, Dr. Marian Christianson, who was sent in by Plastic surgery team, Dr. Christianson for seroma drainage with IR and placement of a drain. Patient states his recovery is going well, recently transitioned to a cane from a walker. States he has recurrent seromas, drained 5x times in the office prior to being sent in for IR procedure. Denies pain down legs, denies paresthesias, denies incontinence of bowel or bladder. Reports "tingling" in his b/l finger tips. No falls or trauma. Denies persistent headaches or headache when sitting up from a flat position. Denies unsteady gait. Reports hx of fumbling fingers / difficulty with buttons from known myopathy in the cervical spine. Patient states he.s been having sever pain in the neck and arms that has been relieved after drainage of fluid.

## 2023-01-07 NOTE — PATIENT PROFILE ADULT - FALL HARM RISK - HARM RISK INTERVENTIONS

## 2023-01-07 NOTE — ED PROVIDER NOTE - OBJECTIVE STATEMENT
Patient is a 57 year-old-male sent in by spine surgeon for IR drainage of a spinal seroma. Reports he had cervical laminectomy by Dr Casillas and had developed fluctuance underneath the surgical scar. Denies fever, chills.

## 2023-01-07 NOTE — H&P ADULT - PROBLEM SELECTOR PLAN 1
Patient wit a recurrent seroma at the c spine surgical site  seen by IR and scheduled for a drain placement 1/9  continue to monitor collection  Has been drained numerous times by plastic surgery

## 2023-01-07 NOTE — ED PROVIDER NOTE - CONSIDERATION OF ADMISSION OBSERVATION
Patient failing outpatient drainage procedures, will need admission for more definitive management of this fluid colleciton Consideration of Admission/Observation

## 2023-01-07 NOTE — H&P ADULT - NSHPLABSRESULTS_GEN_ALL_CORE
11.1   5.65  )-----------( 291      ( 07 Jan 2023 00:37 )             35.4       01-07    136  |  100  |  18  ----------------------------<  135<H>  3.6   |  23  |  0.90    Ca    9.1      07 Jan 2023 00:37    TPro  7.1  /  Alb  4.3  /  TBili  0.2  /  DBili  x   /  AST  21  /  ALT  19  /  AlkPhos  89  01-07                  PT/INR - ( 07 Jan 2023 00:37 )   PT: 12.6 sec;   INR: 1.09 ratio         PTT - ( 07 Jan 2023 00:37 )  PTT:27.4 sec    Lactate Trend            CAPILLARY BLOOD GLUCOSE

## 2023-01-07 NOTE — H&P ADULT - PROBLEM SELECTOR PLAN 3
will start Patient on oxycontin IR 5mg q4h PRN  tylenol as needed  miralax to prevent constipation  follow for oversedation

## 2023-01-07 NOTE — ED PROVIDER NOTE - CLINICAL SUMMARY MEDICAL DECISION MAKING FREE TEXT BOX
and oriented to person, place, time.  Impression: History and physical concerning for postoperative seroma, complaints of paresthesias warrants drainage procedure.  Plan patient will need admission, and preoperative work-up prior to IR drainage of these fluid collections.  Will discuss with spine surgery.

## 2023-01-07 NOTE — ED ADULT NURSE NOTE - NSIMPLEMENTINTERV_GEN_ALL_ED
Implemented All Fall Risk Interventions:  [place] to call system. Call bell, personal items and telephone within reach. Instruct patient to call for assistance. Room bathroom lighting operational. Non-slip footwear when patient is off stretcher. Physically safe environment: no spills, clutter or unnecessary equipment. Stretcher in lowest position, wheels locked, appropriate side rails in place. Provide visual cue, wrist band, yellow gown, etc. Monitor gait and stability. Monitor for mental status changes and reorient to person, place, and time. Review medications for side effects contributing to fall risk. Reinforce activity limits and safety measures with patient and family.

## 2023-01-07 NOTE — ED PROVIDER NOTE - DIFFERENTIAL DIAGNOSIS
seroma, abscess, cellulitis  patient is afebrile at this time no significant leukocytosis that would require empiric abx, does not have significant neurologic findings that would necessitate emergent MRI in the ED Differential Diagnosis

## 2023-01-07 NOTE — ED PROVIDER NOTE - PHYSICAL EXAMINATION
Vital signs: Hypertension appreciated otherwise unremarkable  General: Adult male no acute distress  Spine: Cervical spine scar appreciated with palpable fluctuance under the scar.  Minimally tender  Head:  NC/AT.  Resp: No distress.  Ext: no deformities.  Skin: warm and dry as visualized.  Neuro: alert and oriented to person, place, time.

## 2023-01-08 LAB
ANION GAP SERPL CALC-SCNC: 12 MMOL/L — SIGNIFICANT CHANGE UP (ref 5–17)
BUN SERPL-MCNC: 14 MG/DL — SIGNIFICANT CHANGE UP (ref 7–23)
CALCIUM SERPL-MCNC: 9 MG/DL — SIGNIFICANT CHANGE UP (ref 8.4–10.5)
CHLORIDE SERPL-SCNC: 103 MMOL/L — SIGNIFICANT CHANGE UP (ref 96–108)
CO2 SERPL-SCNC: 23 MMOL/L — SIGNIFICANT CHANGE UP (ref 22–31)
CREAT SERPL-MCNC: 0.87 MG/DL — SIGNIFICANT CHANGE UP (ref 0.5–1.3)
EGFR: 101 ML/MIN/1.73M2 — SIGNIFICANT CHANGE UP
GLUCOSE SERPL-MCNC: 94 MG/DL — SIGNIFICANT CHANGE UP (ref 70–99)
HCT VFR BLD CALC: 32.7 % — LOW (ref 39–50)
HGB BLD-MCNC: 10.4 G/DL — LOW (ref 13–17)
INR BLD: 0.99 RATIO — SIGNIFICANT CHANGE UP (ref 0.88–1.16)
MCHC RBC-ENTMCNC: 24.4 PG — LOW (ref 27–34)
MCHC RBC-ENTMCNC: 31.8 GM/DL — LOW (ref 32–36)
MCV RBC AUTO: 76.8 FL — LOW (ref 80–100)
NRBC # BLD: 0 /100 WBCS — SIGNIFICANT CHANGE UP (ref 0–0)
PLATELET # BLD AUTO: 264 K/UL — SIGNIFICANT CHANGE UP (ref 150–400)
POTASSIUM SERPL-MCNC: 4.2 MMOL/L — SIGNIFICANT CHANGE UP (ref 3.5–5.3)
POTASSIUM SERPL-SCNC: 4.2 MMOL/L — SIGNIFICANT CHANGE UP (ref 3.5–5.3)
PROTHROM AB SERPL-ACNC: 11.5 SEC — SIGNIFICANT CHANGE UP (ref 10.5–13.4)
RBC # BLD: 4.26 M/UL — SIGNIFICANT CHANGE UP (ref 4.2–5.8)
RBC # FLD: 14.8 % — HIGH (ref 10.3–14.5)
SODIUM SERPL-SCNC: 138 MMOL/L — SIGNIFICANT CHANGE UP (ref 135–145)
WBC # BLD: 4.77 K/UL — SIGNIFICANT CHANGE UP (ref 3.8–10.5)
WBC # FLD AUTO: 4.77 K/UL — SIGNIFICANT CHANGE UP (ref 3.8–10.5)

## 2023-01-08 RX ORDER — SENNA PLUS 8.6 MG/1
2 TABLET ORAL AT BEDTIME
Refills: 0 | Status: DISCONTINUED | OUTPATIENT
Start: 2023-01-08 | End: 2023-01-10

## 2023-01-08 RX ADMIN — OXYCODONE HYDROCHLORIDE 5 MILLIGRAM(S): 5 TABLET ORAL at 01:23

## 2023-01-08 RX ADMIN — SENNA PLUS 2 TABLET(S): 8.6 TABLET ORAL at 21:14

## 2023-01-08 RX ADMIN — OXYCODONE HYDROCHLORIDE 5 MILLIGRAM(S): 5 TABLET ORAL at 23:44

## 2023-01-08 RX ADMIN — POLYETHYLENE GLYCOL 3350 17 GRAM(S): 17 POWDER, FOR SOLUTION ORAL at 12:32

## 2023-01-08 NOTE — PROGRESS NOTE ADULT - SUBJECTIVE AND OBJECTIVE BOX
Pt is a 57y s/p C2-T2 PSF, C3-6 lami on 11/8/22 w/ Dr. Casillas for chronc c5 ant wedge vcf w/ retropulsion and CSp myelopathy, w/ incision/wound managed per plastic surgery, Dr. Marian Christianson, who was sent in by Plastic surgery team, Dr. Christianson for seroma drainage with IR and placement of a drain. Patient states his recovery is going well, recently transitioned to a cane from a walker. States he has recurrent seromas, drained 5x times in the office prior to being sent in for IR procedure. Denies pain down legs, denies paresthesias, denies incontinence of bowel or bladder. Reports "tingling" in his b/l finger tips. No falls or trauma. Denies persistent headaches or headache when sitting up from a flat position. Denies unsteady gait. Reports hx of fumbling fingers / difficulty with buttons from known myopathy in the cervical spine. Patient states hes been having sever pain in the neck and arms that has been relieved after drainage of fluid. Pain better controlled today.      MEDICATIONS  (STANDING):  influenza   Vaccine 0.5 milliLiter(s) IntraMuscular once    MEDICATIONS  (PRN):  acetaminophen     Tablet .. 650 milliGRAM(s) Oral every 6 hours PRN Mild Pain (1 - 3)  oxyCODONE    IR 5 milliGRAM(s) Oral every 4 hours PRN Moderate Pain (4 - 6)  polyethylene glycol 3350 17 Gram(s) Oral every 12 hours PRN Constipation          VITALS:   T(C): 36.4 (01-08-23 @ 09:45), Max: 36.8 (01-07-23 @ 16:20)  HR: 95 (01-08-23 @ 09:45) (75 - 95)  BP: 112/74 (01-08-23 @ 09:45) (96/65 - 113/71)  RR: 18 (01-08-23 @ 09:45) (18 - 18)  SpO2: 100% (01-08-23 @ 09:45) (98% - 100%)  Wt(kg): --     PHYSICAL EXAM:  GENERAL: NAD, well nourished and conversant  HEAD:  Atraumatic  EYES: EOM, PERRLA, conjunctiva pink and sclera white  ENT: No tonsillar erythema, exudates, or enlargement, moist mucous membranes, good dentition, no lesions  NECK: Supple, No JVD, normal thyroid, carotids with normal upstrokes and no bruits  CHEST/LUNG: Clear to auscultation bilaterally, No rales, rhonchi, wheezing, or rubs  HEART: Regular rate and rhythm, No murmurs, rubs, or gallops  ABDOMEN: Soft, nondistended, no masses, guarding, tenderness or rebound, bowel sounds present  EXTREMITIES:  2+ Peripheral Pulses, No clubbing, cyanosis, or edema.   LYMPH: No lymphadenopathy noted  SKIN: No rashes or lesions  NERVOUS SYSTEM:  Alert & Oriented X3, normal cognitive function. Motor Strength 5/5 right upper and right lower.  5/5 left upper and left lower extremities, DTRs 2+ intact and symmetric    LABS:        CBC Full  -  ( 08 Jan 2023 06:47 )  WBC Count : 4.77 K/uL  RBC Count : 4.26 M/uL  Hemoglobin : 10.4 g/dL  Hematocrit : 32.7 %  Platelet Count - Automated : 264 K/uL  Mean Cell Volume : 76.8 fl  Mean Cell Hemoglobin : 24.4 pg  Mean Cell Hemoglobin Concentration : 31.8 gm/dL  Auto Neutrophil # : x  Auto Lymphocyte # : x  Auto Monocyte # : x  Auto Eosinophil # : x  Auto Basophil # : x  Auto Neutrophil % : x  Auto Lymphocyte % : x  Auto Monocyte % : x  Auto Eosinophil % : x  Auto Basophil % : x    01-08    138  |  103  |  14  ----------------------------<  94  4.2   |  23  |  0.87    Ca    9.0      08 Jan 2023 06:46    TPro  7.1  /  Alb  4.3  /  TBili  0.2  /  DBili  x   /  AST  21  /  ALT  19  /  AlkPhos  89  01-07    LIVER FUNCTIONS - ( 07 Jan 2023 00:37 )  Alb: 4.3 g/dL / Pro: 7.1 g/dL / ALK PHOS: 89 U/L / ALT: 19 U/L / AST: 21 U/L / GGT: x           PT/INR - ( 08 Jan 2023 06:47 )   PT: 11.5 sec;   INR: 0.99 ratio         PTT - ( 07 Jan 2023 00:37 )  PTT:27.4 sec    CAPILLARY BLOOD GLUCOSE          RADIOLOGY & ADDITIONAL TESTS:

## 2023-01-09 LAB
ANION GAP SERPL CALC-SCNC: 12 MMOL/L — SIGNIFICANT CHANGE UP (ref 5–17)
APTT BLD: 26.7 SEC — LOW (ref 27.5–35.5)
BLD GP AB SCN SERPL QL: NEGATIVE — SIGNIFICANT CHANGE UP
BUN SERPL-MCNC: 14 MG/DL — SIGNIFICANT CHANGE UP (ref 7–23)
CALCIUM SERPL-MCNC: 9.2 MG/DL — SIGNIFICANT CHANGE UP (ref 8.4–10.5)
CHLORIDE SERPL-SCNC: 101 MMOL/L — SIGNIFICANT CHANGE UP (ref 96–108)
CO2 SERPL-SCNC: 24 MMOL/L — SIGNIFICANT CHANGE UP (ref 22–31)
CREAT SERPL-MCNC: 1.04 MG/DL — SIGNIFICANT CHANGE UP (ref 0.5–1.3)
EGFR: 84 ML/MIN/1.73M2 — SIGNIFICANT CHANGE UP
GLUCOSE SERPL-MCNC: 89 MG/DL — SIGNIFICANT CHANGE UP (ref 70–99)
HCT VFR BLD CALC: 35.1 % — LOW (ref 39–50)
HGB BLD-MCNC: 11.1 G/DL — LOW (ref 13–17)
INR BLD: 0.98 RATIO — SIGNIFICANT CHANGE UP (ref 0.88–1.16)
MCHC RBC-ENTMCNC: 24.2 PG — LOW (ref 27–34)
MCHC RBC-ENTMCNC: 31.6 GM/DL — LOW (ref 32–36)
MCV RBC AUTO: 76.6 FL — LOW (ref 80–100)
NRBC # BLD: 0 /100 WBCS — SIGNIFICANT CHANGE UP (ref 0–0)
PLATELET # BLD AUTO: 275 K/UL — SIGNIFICANT CHANGE UP (ref 150–400)
POTASSIUM SERPL-MCNC: 4.5 MMOL/L — SIGNIFICANT CHANGE UP (ref 3.5–5.3)
POTASSIUM SERPL-SCNC: 4.5 MMOL/L — SIGNIFICANT CHANGE UP (ref 3.5–5.3)
PROTHROM AB SERPL-ACNC: 11.4 SEC — SIGNIFICANT CHANGE UP (ref 10.5–13.4)
RBC # BLD: 4.58 M/UL — SIGNIFICANT CHANGE UP (ref 4.2–5.8)
RBC # FLD: 14.7 % — HIGH (ref 10.3–14.5)
RH IG SCN BLD-IMP: POSITIVE — SIGNIFICANT CHANGE UP
SODIUM SERPL-SCNC: 137 MMOL/L — SIGNIFICANT CHANGE UP (ref 135–145)
WBC # BLD: 5.52 K/UL — SIGNIFICANT CHANGE UP (ref 3.8–10.5)
WBC # FLD AUTO: 5.52 K/UL — SIGNIFICANT CHANGE UP (ref 3.8–10.5)

## 2023-01-09 PROCEDURE — 10160 PNXR ASPIR ABSC HMTMA BULLA: CPT

## 2023-01-09 PROCEDURE — 76942 ECHO GUIDE FOR BIOPSY: CPT | Mod: 26

## 2023-01-09 RX ADMIN — Medication 650 MILLIGRAM(S): at 22:00

## 2023-01-09 RX ADMIN — Medication 650 MILLIGRAM(S): at 21:01

## 2023-01-09 RX ADMIN — OXYCODONE HYDROCHLORIDE 5 MILLIGRAM(S): 5 TABLET ORAL at 14:47

## 2023-01-09 RX ADMIN — SENNA PLUS 2 TABLET(S): 8.6 TABLET ORAL at 21:01

## 2023-01-09 RX ADMIN — OXYCODONE HYDROCHLORIDE 5 MILLIGRAM(S): 5 TABLET ORAL at 00:14

## 2023-01-09 NOTE — PRE PROCEDURE NOTE - PRE PROCEDURE EVALUATION
Interventional Radiology Pre-Procedure Note    Diagnosis/Indication: Patient is a 57y old  Male who presents with a chief complaint of c spine fluid collection with intractable pain (08 Jan 2023 13:28). S/P C2-T2 posterior spinal fusion with persistent fluid collection here for drainage.      PAST MEDICAL & SURGICAL HISTORY:  Chronic back pain      History of contracture of joint      No significant past surgical history           Allergies: No Known Allergies      LABS:                        11.1   5.52  )-----------( 275      ( 09 Jan 2023 06:57 )             35.1     01-09    137  |  101  |  14  ----------------------------<  89  4.5   |  24  |  1.04    Ca    9.2      09 Jan 2023 06:57      PT/INR - ( 09 Jan 2023 07:02 )   PT: 11.4 sec;   INR: 0.98 ratio         PTT - ( 09 Jan 2023 07:02 )  PTT:26.7 sec    Procedure/ risks/ benefits were explained, informed consent obtained from patient, verbalizes understanding.

## 2023-01-09 NOTE — PROCEDURE NOTE - PROCEDURE FINDINGS AND DETAILS
Limited posterior neck ultrasound demonstrates superficial fluid collection. 8 Fr drainage catheter placed under ultrasound guidance and 10 cc serosanguinous fluid manually aspirated and sent for gram stain and culture. Connected to CIELO bulb.

## 2023-01-09 NOTE — PROGRESS NOTE ADULT - SUBJECTIVE AND OBJECTIVE BOX
Pt is a 57y s/p C2-T2 PSF, C3-6 lami on 11/8/22 w/ Dr. Casillas for chronc c5 ant wedge vcf w/ retropulsion and CSp myelopathy, w/ incision/wound managed per plastic surgery, Dr. Marian Christianson, who was sent in by Plastic surgery team, Dr. Christianson for seroma drainage with IR and placement of a drain. Patient states his recovery is going well, recently transitioned to a cane from a walker. States he has recurrent seromas, drained 5x times in the office prior to being sent in for IR procedure. Denies pain down legs, denies paresthesias, denies incontinence of bowel or bladder. Reports "tingling" in his b/l finger tips. No falls or trauma. Denies persistent headaches or headache when sitting up from a flat position. Denies unsteady gait. Reports hx of fumbling fingers / difficulty with buttons from known myopathy in the cervical spine. Patient states hes been having sever pain in the neck and arms that has been relieved after drainage of fluid. Pain better controlled today. Patient seen s/p IR placement of a drain at his previous surgical site       MEDICATIONS  (STANDING):  influenza   Vaccine 0.5 milliLiter(s) IntraMuscular once  senna 2 Tablet(s) Oral at bedtime    MEDICATIONS  (PRN):  acetaminophen     Tablet .. 650 milliGRAM(s) Oral every 6 hours PRN Mild Pain (1 - 3)  oxyCODONE    IR 5 milliGRAM(s) Oral every 4 hours PRN Moderate Pain (4 - 6)  polyethylene glycol 3350 17 Gram(s) Oral every 12 hours PRN Constipation          VITALS:   T(C): 37.1 (01-09-23 @ 10:46), Max: 37.1 (01-09-23 @ 07:43)  HR: 84 (01-09-23 @ 10:46) (76 - 96)  BP: 117/81 (01-09-23 @ 10:46) (107/68 - 123/72)  RR: 18 (01-09-23 @ 10:46) (17 - 18)  SpO2: 100% (01-09-23 @ 10:46) (99% - 100%)  Wt(kg): --     PHYSICAL EXAM:  GENERAL: NAD, well nourished and conversant  HEAD:  Atraumatic  EYES: EOM, PERRLA, conjunctiva pink and sclera white  ENT: No tonsillar erythema, exudates, or enlargement, moist mucous membranes, good dentition, no lesions  NECK: Supple, No JVD, normal thyroid, carotids with normal upstrokes and no bruits  CHEST/LUNG: Clear to auscultation bilaterally, No rales, rhonchi, wheezing, or rubs  HEART: Regular rate and rhythm, No murmurs, rubs, or gallops  ABDOMEN: Soft, nondistended, no masses, guarding, tenderness or rebound, bowel sounds present  EXTREMITIES:  2+ Peripheral Pulses, No clubbing, cyanosis, or edema.   LYMPH: No lymphadenopathy noted  SKIN: No rashes or lesions  NERVOUS SYSTEM:  Alert & Oriented X3, normal cognitive function. Motor Strength 5/5 right upper and right lower.  5/5 left upper and left lower extremities, DTRs 2+ intact and symmetric      LABS:        CBC Full  -  ( 09 Jan 2023 06:57 )  WBC Count : 5.52 K/uL  RBC Count : 4.58 M/uL  Hemoglobin : 11.1 g/dL  Hematocrit : 35.1 %  Platelet Count - Automated : 275 K/uL  Mean Cell Volume : 76.6 fl  Mean Cell Hemoglobin : 24.2 pg  Mean Cell Hemoglobin Concentration : 31.6 gm/dL  Auto Neutrophil # : x  Auto Lymphocyte # : x  Auto Monocyte # : x  Auto Eosinophil # : x  Auto Basophil # : x  Auto Neutrophil % : x  Auto Lymphocyte % : x  Auto Monocyte % : x  Auto Eosinophil % : x  Auto Basophil % : x    01-09    137  |  101  |  14  ----------------------------<  89  4.5   |  24  |  1.04    Ca    9.2      09 Jan 2023 06:57        PT/INR - ( 09 Jan 2023 07:02 )   PT: 11.4 sec;   INR: 0.98 ratio         PTT - ( 09 Jan 2023 07:02 )  PTT:26.7 sec    CAPILLARY BLOOD GLUCOSE          RADIOLOGY & ADDITIONAL TESTS:

## 2023-01-10 ENCOUNTER — TRANSCRIPTION ENCOUNTER (OUTPATIENT)
Age: 58
End: 2023-01-10

## 2023-01-10 VITALS
DIASTOLIC BLOOD PRESSURE: 90 MMHG | RESPIRATION RATE: 18 BRPM | HEART RATE: 80 BPM | SYSTOLIC BLOOD PRESSURE: 124 MMHG | OXYGEN SATURATION: 100 % | TEMPERATURE: 98 F

## 2023-01-10 DIAGNOSIS — T88.8XXA OTHER SPECIFIED COMPLICATIONS OF SURGICAL AND MEDICAL CARE, NOT ELSEWHERE CLASSIFIED, INITIAL ENCOUNTER: ICD-10-CM

## 2023-01-10 LAB
GRAM STN FLD: SIGNIFICANT CHANGE UP
HCT VFR BLD CALC: 34.3 % — LOW (ref 39–50)
HGB BLD-MCNC: 10.7 G/DL — LOW (ref 13–17)
MCHC RBC-ENTMCNC: 24.1 PG — LOW (ref 27–34)
MCHC RBC-ENTMCNC: 31.2 GM/DL — LOW (ref 32–36)
MCV RBC AUTO: 77.3 FL — LOW (ref 80–100)
NRBC # BLD: 0 /100 WBCS — SIGNIFICANT CHANGE UP (ref 0–0)
PLATELET # BLD AUTO: 278 K/UL — SIGNIFICANT CHANGE UP (ref 150–400)
RBC # BLD: 4.44 M/UL — SIGNIFICANT CHANGE UP (ref 4.2–5.8)
RBC # FLD: 14.7 % — HIGH (ref 10.3–14.5)
SPECIMEN SOURCE: SIGNIFICANT CHANGE UP
WBC # BLD: 5.33 K/UL — SIGNIFICANT CHANGE UP (ref 3.8–10.5)
WBC # FLD AUTO: 5.33 K/UL — SIGNIFICANT CHANGE UP (ref 3.8–10.5)

## 2023-01-10 PROCEDURE — 87070 CULTURE OTHR SPECIMN AEROBIC: CPT

## 2023-01-10 PROCEDURE — 86901 BLOOD TYPING SEROLOGIC RH(D): CPT

## 2023-01-10 PROCEDURE — 85610 PROTHROMBIN TIME: CPT

## 2023-01-10 PROCEDURE — 76942 ECHO GUIDE FOR BIOPSY: CPT

## 2023-01-10 PROCEDURE — 80053 COMPREHEN METABOLIC PANEL: CPT

## 2023-01-10 PROCEDURE — 87205 SMEAR GRAM STAIN: CPT

## 2023-01-10 PROCEDURE — 99232 SBSQ HOSP IP/OBS MODERATE 35: CPT

## 2023-01-10 PROCEDURE — 85730 THROMBOPLASTIN TIME PARTIAL: CPT

## 2023-01-10 PROCEDURE — 86850 RBC ANTIBODY SCREEN: CPT

## 2023-01-10 PROCEDURE — 80048 BASIC METABOLIC PNL TOTAL CA: CPT

## 2023-01-10 PROCEDURE — C1729: CPT

## 2023-01-10 PROCEDURE — 36415 COLL VENOUS BLD VENIPUNCTURE: CPT

## 2023-01-10 PROCEDURE — 72070 X-RAY EXAM THORAC SPINE 2VWS: CPT

## 2023-01-10 PROCEDURE — C1769: CPT

## 2023-01-10 PROCEDURE — 10160 PNXR ASPIR ABSC HMTMA BULLA: CPT

## 2023-01-10 PROCEDURE — 86140 C-REACTIVE PROTEIN: CPT

## 2023-01-10 PROCEDURE — 72125 CT NECK SPINE W/O DYE: CPT | Mod: MA

## 2023-01-10 PROCEDURE — 87075 CULTR BACTERIA EXCEPT BLOOD: CPT

## 2023-01-10 PROCEDURE — 99285 EMERGENCY DEPT VISIT HI MDM: CPT | Mod: 25

## 2023-01-10 PROCEDURE — 85027 COMPLETE CBC AUTOMATED: CPT

## 2023-01-10 PROCEDURE — 85652 RBC SED RATE AUTOMATED: CPT

## 2023-01-10 PROCEDURE — 85025 COMPLETE CBC W/AUTO DIFF WBC: CPT

## 2023-01-10 PROCEDURE — 87635 SARS-COV-2 COVID-19 AMP PRB: CPT

## 2023-01-10 PROCEDURE — 72040 X-RAY EXAM NECK SPINE 2-3 VW: CPT

## 2023-01-10 PROCEDURE — 86900 BLOOD TYPING SEROLOGIC ABO: CPT

## 2023-01-10 RX ORDER — NALOXONE HYDROCHLORIDE 4 MG/.1ML
1 SPRAY NASAL
Qty: 1 | Refills: 0
Start: 2023-01-10

## 2023-01-10 RX ORDER — OXYCODONE HYDROCHLORIDE 5 MG/1
1 TABLET ORAL
Qty: 30 | Refills: 0
Start: 2023-01-10 | End: 2023-01-14

## 2023-01-10 NOTE — PROGRESS NOTE ADULT - PROBLEM SELECTOR PLAN 1
Patient wit a recurrent seroma at the c spine surgical site  Patient is s/p drain placement  follow output  continue to monitor collection  DC planning home with follow up with IR and plastic surgery
Patient wit a recurrent seroma at the c spine surgical site  Patient is s/p drain placement  follow out put  continue to monitor collection  will discuss DC planning with draine to follow up with IR
Patient wit a recurrent seroma at the c spine surgical site  seen by IR and scheduled for a drain placement 1/9  continue to monitor collection  Has been drained numerous times by plastic surgery  continue to monitor area of fluctuance

## 2023-01-10 NOTE — PROGRESS NOTE ADULT - ASSESSMENT
56 yo male presents after recent c spine surgery. Patient has a recurrent seroma at the surgical site. Patient s/p drain placement by IR
58 yo male presents after recent c spine surgery. Patient has a recurrent seroma at the surgical site. Sent in by plastic surgery for placement of a drain by IR. Patient seen now with complaint of sever pain 
56 yo male presents after recent c spine surgery. Patient has a recurrent seroma at the surgical site. Patient s/p drain placement by IR

## 2023-01-10 NOTE — PROGRESS NOTE ADULT - SUBJECTIVE AND OBJECTIVE BOX
Pt is a 57y s/p C2-T2 PSF, C3-6 lami on 11/8/22 w/ Dr. Casillas for chronc c5 ant wedge vcf w/ retropulsion and CSp myelopathy, w/ incision/wound managed per plastic surgery, Dr. Marian Christianson, who was sent in by Plastic surgery team, Dr. Christianson for seroma drainage with IR and placement of a drain. Patient states his recovery is going well, recently transitioned to a cane from a walker. States he has recurrent seromas, drained 5x times in the office prior to being sent in for IR procedure. Denies pain down legs, denies paresthesias, denies incontinence of bowel or bladder. Reports "tingling" in his b/l finger tips. No falls or trauma. Denies persistent headaches or headache when sitting up from a flat position. Denies unsteady gait. Reports hx of fumbling fingers / difficulty with buttons from known myopathy in the cervical spine. Patient states hes been having sever pain in the neck and arms that has been relieved after drainage of fluid. Pain better controlled today. Patient seen s/p IR placement of a drain at his previous surgical site. Tolerated the procedure well. Pain has been well managed      MEDICATIONS  (STANDING):  influenza   Vaccine 0.5 milliLiter(s) IntraMuscular once  senna 2 Tablet(s) Oral at bedtime    MEDICATIONS  (PRN):  acetaminophen     Tablet .. 650 milliGRAM(s) Oral every 6 hours PRN Mild Pain (1 - 3)  oxyCODONE    IR 5 milliGRAM(s) Oral every 4 hours PRN Moderate Pain (4 - 6)  polyethylene glycol 3350 17 Gram(s) Oral every 12 hours PRN Constipation          VITALS:   T(C): 36.7 (01-10-23 @ 09:18), Max: 37.2 (01-09-23 @ 20:38)  HR: 86 (01-10-23 @ 09:18) (80 - 100)  BP: 123/62 (01-10-23 @ 09:18) (106/64 - 131/76)  RR: 18 (01-10-23 @ 09:18) (18 - 18)  SpO2: 98% (01-10-23 @ 09:18) (98% - 100%)  Wt(kg): --     PHYSICAL EXAM:  GENERAL: NAD, well nourished and conversant  HEAD:  Atraumatic  EYES: EOM, PERRLA, conjunctiva pink and sclera white  ENT: No tonsillar erythema, exudates, or enlargement, moist mucous membranes, good dentition, no lesions  NECK: Supple, No JVD, normal thyroid, carotids with normal upstrokes and no bruits  CHEST/LUNG: Clear to auscultation bilaterally, No rales, rhonchi, wheezing, or rubs  HEART: Regular rate and rhythm, No murmurs, rubs, or gallops  ABDOMEN: Soft, nondistended, no masses, guarding, tenderness or rebound, bowel sounds present  EXTREMITIES:  2+ Peripheral Pulses, No clubbing, cyanosis, or edema.   LYMPH: No lymphadenopathy noted  SKIN: No rashes or lesions  NERVOUS SYSTEM:  Alert & Oriented X3, normal cognitive function. Motor Strength 5/5 right upper and right lower.  5/5 left upper and left lower extremities, DTRs 2+ intact and symmetric    LABS:        CBC Full  -  ( 10 Dave 2023 06:50 )  WBC Count : 5.33 K/uL  RBC Count : 4.44 M/uL  Hemoglobin : 10.7 g/dL  Hematocrit : 34.3 %  Platelet Count - Automated : 278 K/uL  Mean Cell Volume : 77.3 fl  Mean Cell Hemoglobin : 24.1 pg  Mean Cell Hemoglobin Concentration : 31.2 gm/dL  Auto Neutrophil # : x  Auto Lymphocyte # : x  Auto Monocyte # : x  Auto Eosinophil # : x  Auto Basophil # : x  Auto Neutrophil % : x  Auto Lymphocyte % : x  Auto Monocyte % : x  Auto Eosinophil % : x  Auto Basophil % : x    01-09    137  |  101  |  14  ----------------------------<  89  4.5   |  24  |  1.04    Ca    9.2      09 Jan 2023 06:57        PT/INR - ( 09 Jan 2023 07:02 )   PT: 11.4 sec;   INR: 0.98 ratio         PTT - ( 09 Jan 2023 07:02 )  PTT:26.7 sec    CAPILLARY BLOOD GLUCOSE          RADIOLOGY & ADDITIONAL TESTS:

## 2023-01-10 NOTE — DISCHARGE NOTE PROVIDER - NSDCMRMEDTOKEN_GEN_ALL_CORE_FT
acetaminophen 325 mg oral tablet: 3 tab(s) orally every 8 hours, As Needed  cyclobenzaprine 10 mg oral tablet: 1 tab(s) orally every 8 hours, As Needed  gabapentin 100 mg oral capsule: 1 cap(s) orally 3 times a day  oxyCODONE 5 mg oral tablet: 1 or 2tab(s) orally every 4 hours, as needed for moderate to severe pain MDD:8  pantoprazole 40 mg oral delayed release tablet: 1 tab(s) orally once a day (before a meal) MDD:1  polyethylene glycol 3350 oral powder for reconstitution: 17 gram(s) orally every 12 hours, As Needed  Rolling Walker: Dx: PSF C2-T2        MERLIN:  senna leaf extract oral tablet: 2 tab(s) orally once a day (at bedtime)   acetaminophen 325 mg oral tablet: 3 tab(s) orally every 8 hours, As Needed  cyclobenzaprine 10 mg oral tablet: 1 tab(s) orally every 8 hours, As Needed  gabapentin 100 mg oral capsule: 1 cap(s) orally 3 times a day  naloxone 4 mg/0.1 mL nasal spray: 1 spray(s) intranasally once in each nostril prn opioid overdose   oxyCODONE 5 mg oral tablet: 1 tab(s) orally every 4 hours, As needed, Moderate Pain (4 - 6) MDD:6 tabs  pantoprazole 40 mg oral delayed release tablet: 1 tab(s) orally once a day (before a meal) MDD:1  polyethylene glycol 3350 oral powder for reconstitution: 17 gram(s) orally every 12 hours, As Needed  Rolling Walker: Dx: JOYCE C2-T2        MERLIN:  senna leaf extract oral tablet: 2 tab(s) orally once a day (at bedtime)

## 2023-01-10 NOTE — DISCHARGE NOTE PROVIDER - NSDCCPCAREPLAN_GEN_ALL_CORE_FT
PRINCIPAL DISCHARGE DIAGNOSIS  Diagnosis: Postoperative seroma of skin after non-dermatologic procedure  Assessment and Plan of Treatment: You underwent seroma drainage with the IR team on 01/09/2023  Please follow-up with your primary care doctor within 1 week of discharge  Covid follow-up appointment on 1/15/23 at 11:35 AM with IR team  CT scan follow up appointment on 1/18/23 at 12:30 PM with IR team  Tube Check follow-up appointment on 1/18/23 with IR team  Dressing Care Instructions as follows:  - Continue to monitor output. Once output <10cc in a 24hr period, obtain a non- con CT to evaluate collection followed by a tube check. This will be obtained at an outpatient appointment.   -Flush drain with 5cc normal saline daily forward only; DO NOT aspirate  -change dressing every 3 days or when dressing is saturated  -Follow up with IR at appointments listed above      SECONDARY DISCHARGE DIAGNOSES  Diagnosis: Neuropathy  Assessment and Plan of Treatment:     Diagnosis: Pain  Assessment and Plan of Treatment:      PRINCIPAL DISCHARGE DIAGNOSIS  Diagnosis: Postoperative seroma of skin after non-dermatologic procedure  Assessment and Plan of Treatment: You underwent seroma drainage with the IR team on 01/09/2023  Please follow-up with your primary care doctor within 1 week of discharge.  Follow up with Dr. Casillas.  Continue to take pain medication as needed. Oxycodone IR 5 mg PO was sent to your pharmacy. Naloxone rescue kit was sent to your pharmacy. Because oxycodone can cause contipation, continue to take your bowel regimen of senna and miralax. If you take your home cyclobenzaprine, do not drive nor operate heavy machinery as muscle relaxants may make you drowsy.  Covid follow-up appointment on 1/15/23 at 11:35 AM with IR team  CT scan follow up appointment on 1/18/23 at 12:30 PM with IR team  Tube Check follow-up appointment on 1/18/23 with IR team  Dressing Care Instructions as follows:  - Continue to monitor output. Once output <10cc in a 24hr period, obtain a non- con CT to evaluate collection followed by a tube check. This will be obtained at an outpatient appointment.   -Flush drain with 5cc normal saline daily forward only; DO NOT aspirate  -change dressing every 3 days or when dressing is saturated  -Follow up with IR at appointments listed above      SECONDARY DISCHARGE DIAGNOSES  Diagnosis: Neuropathy  Assessment and Plan of Treatment: - Continue home gabapentin 100 mg three times a day

## 2023-01-10 NOTE — DISCHARGE NOTE PROVIDER - PROVIDER TOKENS
PROVIDER:[TOKEN:[[id number]],FOLLOWUP:[1 week]],PROVIDER:[TOKEN:[[id number]],FOLLOWUP:[1 week]] PROVIDER:[TOKEN:[[id number]],FOLLOWUP:[1 week]],PROVIDER:[TOKEN:[[id number]],FOLLOWUP:[1-3 days],ESTABLISHEDPATIENT:[T]]

## 2023-01-10 NOTE — PROGRESS NOTE ADULT - PROBLEM SELECTOR PLAN 3
will start Patient on oxycontin IR 5mg q4h PRN  tylenol as needed  miralax to prevent constipation  follow for oversedation  Pain better controlled today
continue Oxycontin as needed  DC home with pain meds  eventual change to tylenol
will start Patient on oxycontin IR 5mg q4h PRN  tylenol as needed  miralax to prevent constipation  follow for oversedation  Pain better controlled today

## 2023-01-10 NOTE — DISCHARGE NOTE NURSING/CASE MANAGEMENT/SOCIAL WORK - PATIENT PORTAL LINK FT
You can access the FollowMyHealth Patient Portal offered by [facility] by registering at the following website: [email or web address]. By joining [employer]’s FollowMyHealth portal, you will also be able to view your health information using other applications (apps) compatible with our system.

## 2023-01-10 NOTE — PROGRESS NOTE ADULT - SUBJECTIVE AND OBJECTIVE BOX
Interventional Radiology Follow-Up Note    This is a 57y Male s/p Posterior cervical collection drainage on 1/9 in Interventional Radiology with Dr. Cortez.      S: Patient seen and examined @ bedside. Reports pain has improved since drainage.     Medication:       Vitals:   T(F): 98, Max: 99 (20:38)  HR: 86  BP: 123/62  RR: 18  SpO2: 98%    Physical Exam:  General: Nontoxic, in NAD, A&O x3.  Neck: Posterior neck with drainage catheter in place attached to bulb w/ serous fluid. Site soft, nttp.       LABS:  WBC 5.33 / Hgb 10.7 / Hct 34.3 / Plt 278  Na -- / K -- / CO2 -- / Cl -- / BUN -- / Cr -- / Glucose --  ALT -- / AST -- / Alk Phos -- / Tbili --  Ptt -- / Pt -- / INR --    24hr Drain output: 125cc    Assessment/Plan:   Patient is a 57y old  Male who presents with a chief complaint of c spine fluid collection with intractable pain (08 Jan 2023 13:28). S/P C2-T2 posterior spinal fusion with persistent fluid collection s/p drainage on 1/9 in Interventional Radiology with Dr. Cortez.      -Continue to monitor output. Once output <10cc 24hr period recommend obtaining a non- con CT to evaluate collection followed by a tube check. This can be done as an out patient if patient is pending discharge.   -flush drain with 5cc NS daily forward only; DO NOT aspirate  -change dressing q3 days or when dressing is saturated  -regarding outpatient follow up with IR, if the patient is d/c home with drainage catheter they can make an appointment with IR by calling the IR booking office at [phone]; recommend IR follow in 7-10 days for tube evaluation.  -they will benefit from VNS service to help with drainage catheter care; they should continue same drainage catheter care as an outpatient.  -continue global management per primary team   -Greater than 50% of the encounter was spent counseling and/ or coordination of care on drain care and follow up.   -Please call IR at extension [phone] with any questions, concerns, or issues regarding above.      Carolyn Kruger PA-C  Available on teams     Interventional Radiology Follow-Up Note    This is a 57y Male s/p Posterior cervical collection drainage on 1/9 in Interventional Radiology with Dr. Cortez.      S: Patient seen and examined @ bedside. Reports pain has improved since drainage.     Medication:       Vitals:   T(F): 98, Max: 99 (20:38)  HR: 86  BP: 123/62  RR: 18  SpO2: 98%    Physical Exam:  General: Nontoxic, in NAD, A&O x3.  Neck: Posterior neck with drainage catheter in place attached to bulb w/ serous fluid. Site soft, nttp.       LABS:  WBC 5.33 / Hgb 10.7 / Hct 34.3 / Plt 278  Na -- / K -- / CO2 -- / Cl -- / BUN -- / Cr -- / Glucose --  ALT -- / AST -- / Alk Phos -- / Tbili --  Ptt -- / Pt -- / INR --    24hr Drain output: 125cc    Assessment/Plan:   Patient is a 57y old  Male who presents with a chief complaint of c spine fluid collection with intractable pain (08 Jan 2023 13:28). S/P C2-T2 posterior spinal fusion with persistent fluid collection s/p drainage on 1/9 in Interventional Radiology with Dr. Cortez.      -Continue to monitor output. Once output <10cc 24hr period recommend obtaining a non- con CT to evaluate collection followed by a tube check. This can be done as an out patient if patient is pending discharge.   -flush drain with 5cc NS daily forward only; DO NOT aspirate  -change dressing q3 days or when dressing is saturated  -IR follow up appointment facilitated for 1/18 @1230pm for CT and Drain study. COVID PCR scheduled on 1/15 @11:35am at the [facility]. If patient should need to reschedule can do so by calling the IR booking office at [phone].   -they will benefit from VNS service to help with drainage catheter care; they should continue same drainage catheter care as an outpatient.  -continue global management per primary team   -Greater than 50% of the encounter was spent counseling and/ or coordination of care on drain care and follow up.   -Please call IR at extension [phone] with any questions, concerns, or issues regarding above.      Carolyn Kruger PA-C  Available on teams

## 2023-01-10 NOTE — DISCHARGE NOTE NURSING/CASE MANAGEMENT/SOCIAL WORK - NSSCNAMETXT_GEN_ALL_CORE
PULMONARY/CRITICAL CARE          BRIEF HOSPITAL COURSE: ***  · Chief Complaint: The patient is a 71y Female complaining of difficulty breathing.	  · HPI Objective Statement: 72 yo wf from NH with sob today.Put on BIPAP by ems prior to her arrival here for decreased o2sat.h/o copd.pt states she has dry cough.. denies cp or fever.	  · Presenting Symptoms: COUGH, DIFFICULTY BREATHING, SHORTNESS OF BREATH	  · Negative Findings: no chest pain, no chills, no diaphoresis, no edema, no fever, no hemoptysis, no wheezing	  · Timing: sudden onset	  · Duration: today	  · Quality: alteration in breathing pattern, non-productive cough, yellow mucous	      Events last 24 hours: ***    PAST MEDICAL & SURGICAL HISTORY:  Depression  Neuropathy  GERD (gastroesophageal reflux disease)  Chronic obstructive pulmonary disease, unspecified COPD type  Chronic obstructive pulmonary disease with acute lower respiratory infection  No significant past surgical history    Allergies    No Known Allergies    Intolerances    Social hx--smoked until 4 yrs ago  L[facility] in [facility]  FAMILY HISTORY:       Review of Systems:  CONSTITUTIONAL: No fever, chills, or fatigue  EYES: No eye pain, visual disturbances, or discharge  ENMT:  No difficulty hearing, tinnitus, vertigo; No sinus or throat pain  NECK: No pain or stiffness  RESPIRATORY: O2 dependent COPD. No cough, wheezing, chills or hemoptysis; No shortness of breath  CARDIOVASCULAR: No chest pain, palpitations, dizziness, or leg swelling  GASTROINTESTINAL: No abdominal or epigastric pain. No nausea, vomiting, or hematemesis; No diarrhea or constipation. No melena or hematochezia.  GENITOURINARY: No dysuria, frequency, hematuria, or incontinence  NEUROLOGICAL: No headaches, memory loss, loss of strength, numbness, or tremors  SKIN: No itching, burning, rashes, or lesions   MUSCULOSKELETAL: No joint pain or swelling; No muscle, back, or extremity pain  PSYCHIATRIC: No depression, anxiety, mood swings, or difficulty sleeping      Medications:  levoFLOXacin IVPB 500milliGRAM(s) IV Intermittent every 24 hours      ALBUTerol/ipratropium for Nebulization 3milliLiter(s) Nebulizer every 6 hours  buDESOnide 160 MICROgram(s)/formoterol 4.5 MICROgram(s) Inhaler 1Puff(s) Inhalation two times a day  tiotropium 18 MICROgram(s) Capsule 1Capsule(s) Inhalation daily    LORazepam   Injectable 1milliGRAM(s) IV Push once            methylPREDNISolone sodium succinate Injectable 40milliGRAM(s) IV Push every 8 hours    sodium chloride 0.9% Bolus 800milliLiter(s) IV Bolus once                ICU Vital Signs Last 24 Hrs  T(C): 37.8, Max: 37.8 (-10 @ 05:58)  T(F): 100, Max: 100 (06-10 @ 05:58)  HR: 106 (100 - 106)  BP: 153/65 (153/65 - 179/101)  BP(mean): --  ABP: --  ABP(mean): --  RR: 21 (21 - 30)  SpO2: 98% (98% - 100%)    Vital Signs Last 24 Hrs  T(C): 37.8, Max: 37.8 (06-10 @ 05:58)  T(F): 100, Max: 100 (06-10 @ 05:58)  HR: 106 (100 - 106)  BP: 153/65 (153/65 - 179/101)  BP(mean): --  RR: 21 (21 - 30)  SpO2: 98% (98% - 100%)        I&O's Detail        LABS:                        13.4   8.9   )-----------( 236      ( 10 Rashawn 2017 02:13 )             38.6     06-10    139  |  100  |  18  ----------------------------<  134<H>  4.3   |  37<H>  |  0.68    Ca    9.0      10 Rashawn 2017 02:13    TPro  7.1  /  Alb  3.7  /  TBili  0.6  /  DBili  x   /  AST  19  /  ALT  17  /  AlkPhos  53  06-10      CARDIAC MARKERS ( 10 Rashawn 2017 02:13 )  .000 ng/mL / x     / 104 U/L / x     / 3.7 ng/mL      CAPILLARY BLOOD GLUCOSE    PT/INR - ( 10 Rashawn 2017 02:13 )   PT: 10.5 sec;   INR: 0.96 ratio         PTT - ( 10 Rashawn 2017 02:13 )  PTT:32.4 sec  Urinalysis Basic - ( 10 Rashawn 2017 02:13 )    Color: Yellow / Appearance: Clear / S.020 / pH: x  Gluc: x / Ketone: Negative  / Bili: Negative / Urobili: Negative mg/dL   Blood: x / Protein: 15 mg/dL / Nitrite: Negative   Leuk Esterase: Moderate / RBC: 6-10 /HPF / WBC 11-25   Sq Epi: x / Non Sq Epi: Few / Bacteria: Few      CULTURES:      Physical Examination:    General: Mod  acute distress.      HEENT: Pupils equal, reactive to light.  Symmetric.    PULM: Few wheeze bilat. good excursion     CVS: Regular rate and rhythm, no murmurs, rubs, or gallops    ABD: Soft, nondistended, nontender, normoactive bowel sounds, no masses    EXT: No edema, nontender    SKIN: Warm and well perfused, no rashes noted.    NEURO: Alert, oriented, interactive, nonfocal    RADIOLOGY: ***    CRITICAL CARE TIME SPENT: ***
[facility] [place] [facility]

## 2023-01-10 NOTE — DISCHARGE NOTE PROVIDER - NSDCFUSCHEDAPPT_GEN_ALL_CORE_FT
Marco Casillas  [facility]  ORTHOSUR [address] [facility]  Scheduled Appointment: 01/11/2023     Marco Casillas  [facility] Physician [facility]  ORTHOSURG [address]  Scheduled Appointment: 01/11/2023    [facility]  CATSCAN [address]  Scheduled Appointment: 01/18/2023     Marco Casillas  [facility] Physician [facility]  ORTHOSURG [address]  Scheduled Appointment: 01/11/2023    Markos Young  [facility]  Scheduled Appointment: 01/15/2023    [facility] Physician [facility]  CATSCAN [address]  Scheduled Appointment: 01/18/2023    Doctor, Unknown  [facility] PRA-[facility]  Scheduled Appointment: 01/18/2023    Mat Veras  [facility] IRD-[facility]  Scheduled Appointment: 01/18/2023

## 2023-01-10 NOTE — PROGRESS NOTE ADULT - PROBLEM SELECTOR PROBLEM 1
Fluid collection at surgical site, initial encounter

## 2023-01-10 NOTE — DISCHARGE NOTE PROVIDER - CARE PROVIDER_API CALL
[facility]  [address] [place] [address]  Phone: [phone]  Fax: [phone]  Follow Up Time: 1 week    Halaibeh, Mohammad A (MD)  Radiology General  [facility] [place], [place] [address]  Phone: [phone]  Fax: [phone]  Follow Up Time: 1 week   Halaibeh, Mohammad A (MD)  Radiology General  [address] [place] [address]  Phone: [phone]  Fax: [phone]  Follow Up Time: 1 week    [place] [address]  Phone: [phone]  Fax: [phone]  Established Patient  Follow Up Time: 1-3 days

## 2023-01-10 NOTE — DISCHARGE NOTE PROVIDER - NSDCFUADDAPPT_GEN_ALL_CORE_FT
APPTS ARE READY TO BE MADE: [x ] YES    Best Family or Patient Contact (if needed):    Additional Information about above appointments (if needed):    1: Bryant (1 week)  2: has IR appointments  3:    Other comments or requests:    APPTS ARE READY TO BE MADE: [x ] YES    Best Family or Patient Contact (if needed):    Additional Information about above appointments (if needed):    1: Sugrim (within 1 week)  2: has IR appointments  3:    Other comments or requests:    APPTS ARE READY TO BE MADE: [x ] YES    Best Family or Patient Contact (if needed):    Additional Information about above appointments (if needed):    1: Ani (within 1 week)  2: has IR appointments  3:    Other comments or requests:     Patient was scheduled with Dr. Law on 1/24 1pm  at [address] [place] [address] through the provider's office.  APPTS ARE READY TO BE MADE: [x ] YES    Best Family or Patient Contact (if needed):    Additional Information about above appointments (if needed):    1: Ani (within 1 week)  2: has IR appointments  3:    Other comments or requests:     Patient was scheduled with Dr. Law on 1/24 1pm  at [id number] [facility] through the provider's office.     I left messages for Dr. Mohammed Halaibeh's office. I left voicemail on  [phone] and [phone] opt 1 for the office to call patient back. He will await a call back from the office and call us if any other assistance is needed.

## 2023-01-10 NOTE — DISCHARGE NOTE NURSING/CASE MANAGEMENT/SOCIAL WORK - NSDCFUADDAPPT_GEN_ALL_CORE_FT
APPTS ARE READY TO BE MADE: [x ] YES    Best Family or Patient Contact (if needed):    Additional Information about above appointments (if needed):    1: Sugrim (within 1 week)  2: has IR appointments  3:    Other comments or requests:

## 2023-01-10 NOTE — PROGRESS NOTE ADULT - REASON FOR ADMISSION
c spine fluid collection with intractable pain

## 2023-01-10 NOTE — DISCHARGE NOTE PROVIDER - HOSPITAL COURSE
58 yo male presents after recent c spine surgery. Patient has a recurrent seroma at the surgical site. Patient s/p drain placement by IR     Problem/Plan - 1:  ·  Problem: Fluid collection at surgical site, initial encounter.   ·  Plan: Patient wit a recurrent seroma at the c spine surgical site  Patient is s/p drain placement  follow out put  continue to monitor collection  will discuss DC planning with draine to follow up with IR.     Problem/Plan - 2:  ·  Problem: Neuropathy.   ·  Plan: Patient with continue weakness in the upper extremities  continue  physical therapy as tolerated  pain meds as needed.     Problem/Plan - 3:  ·  Problem: Pain.   ·  Plan: will start Patient on oxycontin IR 5mg q4h PRN  tylenol as needed  miralax to prevent constipation  follow for oversedation  Pain better controlled today.    Discharge planning discussed with attending Dr. Moulton. Patient is medically cleared and stable for discharge. Medication Reconciliation reviewed with attending. Follow up outpatient with your PCP and with IR team.   56 yo male presents after recent c spine surgery. Patient has a recurrent seroma at the surgical site. Patient s/p drain placement by IR     Problem/Plan - 1:  ·  Problem: Fluid collection at surgical site, initial encounter.   ·  Plan: Patient wit a recurrent seroma at the c spine surgical site  Patient is s/p drain placement  follow out put  continue to monitor collection  will discuss DC planning with draine to follow up with IR.     Problem/Plan - 2:  ·  Problem: Neuropathy.   ·  Plan: Patient with continue weakness in the upper extremities  continue  physical therapy as tolerated  pain meds as needed.     Problem/Plan - 3:  ·  Problem: Pain.   ·  Plan: will start Patient on oxycontin IR 5mg q4h PRN  tylenol as needed  miralax to prevent constipation  follow for oversedation  Pain better controlled today.    Discharge planning discussed with attending Dr. Moulton. Patient is medically cleared and stable for discharge. Medication Reconciliation reviewed with attending. Follow up outpatient with your PCP, the ortho team, and the IR team.

## 2023-01-10 NOTE — PROGRESS NOTE ADULT - TIME BILLING
Discussed treatment plan with patient at bedside.   DC planning home   I am a non participating [employer] physician seeing Pt in coverage for Dr Rubio. The above patient documentation by Dr. Hurtado was reviewed and I agree with his evaluation, assessment and treatment plan.  Dion Rubio M.D.
Discussed treatment plan with patient at bedside.   I am a non participating [employer] physician seeing Pt in coverage for Dr Rubio. The above patient documentation by Dr. Hurtado was reviewed and I agree with his evaluation, assessment and treatment plan.  Dion Rubio M.D.
Discussed treatment plan with patient at bedside.   I am a non participating [employer] physician seeing Pt in coverage for Dr Rubio. The above patient documentation by Dr. Hurtado was reviewed and I agree with his evaluation, assessment and treatment plan.  Dion Rubio M.D.

## 2023-01-11 ENCOUNTER — APPOINTMENT (OUTPATIENT)
Dept: ORTHOPEDIC SURGERY | Facility: CLINIC | Age: 58
End: 2023-01-11
Payer: COMMERCIAL

## 2023-01-11 VITALS
WEIGHT: 140 LBS | SYSTOLIC BLOOD PRESSURE: 122 MMHG | BODY MASS INDEX: 23.32 KG/M2 | DIASTOLIC BLOOD PRESSURE: 82 MMHG | HEIGHT: 65 IN

## 2023-01-11 PROCEDURE — 72040 X-RAY EXAM NECK SPINE 2-3 VW: CPT

## 2023-01-11 PROCEDURE — 99024 POSTOP FOLLOW-UP VISIT: CPT

## 2023-01-11 RX ORDER — TIZANIDINE 2 MG/1
2 TABLET ORAL EVERY 6 HOURS
Qty: 56 | Refills: 0 | Status: ACTIVE | COMMUNITY
Start: 2023-01-11 | End: 1900-01-01

## 2023-01-12 NOTE — HISTORY OF PRESENT ILLNESS
[de-identified] : This is a 57-year-old male who is following up 2 months from his C2-T2 fusion for cervical myelopathy.  Patient developed a posterior cervical seroma and had a drain placed and was recently discharged from [facility].  He has a follow-up appointment already scheduled with plastics for further evaluation of the drain.  Patient has been doing physical therapy and at baseline walks with a cane at this time.  He denies any worsening symptoms in regards to his gait as well as his hand dexterity.  Patient also notes some bilateral lower extremity radiculopathy that is intermittent at times and increases when he walks.\par \par 12/14/22\par This is a 57-year-old male who is now approximately 5 weeks out from surgery.  Overall he has done well.  He still notes improvements in his hand dexterity and balance.  He does have some mid back pain.  He denies any radicular pain.  He is anxious to start physical therapy to help with his activity level.\par \par 11/30/22\par This is a 57-year-old male that is now approximately 22 days out from a C2-T2 posterior cervical spinal fusion.  Overall he has done remarkably well.  He is walking.  His hand strength has improved.  He feels more steady on his feet.  He denies any issues in terms of bowel bladder function or saddle anesthesia.  Overall he has already noted a significant improvement in his ability to perform his activities of daily living as well.

## 2023-01-12 NOTE — REVIEW OF SYSTEMS
[Negative] : Respiratory [FreeTextEntry9] : neck pain [de-identified] : bilateral lower extremity posterior thigh to calf radiculopathy; cervical myelopathy

## 2023-01-12 NOTE — PHYSICAL EXAM
[de-identified] : Cervical Physical Exam:\par \par Gait - antalgic, walks with a cane at baseline\par \par Station- Normal\par \par Sagittal Balance- Normal\par \par Compensatory mechanism - none\par \par Horizontal Gaze- Maintained\par \par Heel walk- unable to perform\par \par Toe walk- unable to perform\par \par \par Shelton's- positive\par \par Shoulder Exam - Normal\par \par Spurling's- None\par \par Wrist Pulses- 2+ radial/ulnar\par \par Cervical range of motion- significantly reduced\par \par Sensation\par C5-T1 sensation intact to light touch bilaterally\par \par L1-S1 sensation intact to light touch bilaterally\par \par Motor\par                  Right/Left\par - Deltoid     5/5\par - Biceps     5/5\par - Triceps    5/5\par - WF          5/5\par - WE          5/5\par - IO            5/5\par -         5/5\par \par - IP             5/5\par - Quad       5/5\par - HS           5/5\par - TA           5/5\par - Gastroc   5/5\par - EHL          5/5\par \par Drain placed on left side upper thoracic area with no significant swelling, erythema or sign of infection. \par  [de-identified] : Cervical radiographs\par Hardware in appropriate position\par No acute complication

## 2023-01-12 NOTE — ASSESSMENT
[FreeTextEntry1] : Is a 57-year-old male with a drain in place for cervical neuroma which will be followed by plastic surgery.  Patient will continue with physical therapy for balance and gait training exercises.  He will try a course of muscle relaxers for his lower extremity complaints.  He will follow-up in 2 to 3 weeks, sooner for any worsening concerning symptoms.  All patient questions answered to her satisfaction.

## 2023-01-14 LAB
CULTURE RESULTS: SIGNIFICANT CHANGE UP
SPECIMEN SOURCE: SIGNIFICANT CHANGE UP

## 2023-01-15 ENCOUNTER — OUTPATIENT (OUTPATIENT)
Dept: OUTPATIENT SERVICES | Facility: HOSPITAL | Age: 58
LOS: 1 days | End: 2023-01-15
Payer: COMMERCIAL

## 2023-01-15 DIAGNOSIS — Z11.52 ENCOUNTER FOR SCREENING FOR COVID-19: ICD-10-CM

## 2023-01-15 LAB — SARS-COV-2 RNA SPEC QL NAA+PROBE: SIGNIFICANT CHANGE UP

## 2023-01-15 PROCEDURE — U0003: CPT

## 2023-01-15 PROCEDURE — C9803: CPT

## 2023-01-15 PROCEDURE — U0005: CPT

## 2023-01-18 ENCOUNTER — TRANSCRIPTION ENCOUNTER (OUTPATIENT)
Age: 58
End: 2023-01-18

## 2023-01-18 ENCOUNTER — APPOINTMENT (OUTPATIENT)
Dept: CT IMAGING | Facility: HOSPITAL | Age: 58
End: 2023-01-18

## 2023-01-18 ENCOUNTER — RESULT REVIEW (OUTPATIENT)
Age: 58
End: 2023-01-18

## 2023-01-18 ENCOUNTER — OUTPATIENT (OUTPATIENT)
Dept: OUTPATIENT SERVICES | Facility: HOSPITAL | Age: 58
LOS: 1 days | End: 2023-01-18
Payer: COMMERCIAL

## 2023-01-18 VITALS
SYSTOLIC BLOOD PRESSURE: 103 MMHG | HEART RATE: 102 BPM | DIASTOLIC BLOOD PRESSURE: 75 MMHG | OXYGEN SATURATION: 99 % | TEMPERATURE: 98 F | HEIGHT: 62 IN | WEIGHT: 104.94 LBS | RESPIRATION RATE: 18 BRPM

## 2023-01-18 DIAGNOSIS — T88.8XXA OTHER SPECIFIED COMPLICATIONS OF SURGICAL AND MEDICAL CARE, NOT ELSEWHERE CLASSIFIED, INITIAL ENCOUNTER: ICD-10-CM

## 2023-01-18 DIAGNOSIS — M54.2 CERVICALGIA: ICD-10-CM

## 2023-01-18 DIAGNOSIS — R18.8 OTHER ASCITES: ICD-10-CM

## 2023-01-18 DIAGNOSIS — Z46.82 ENCOUNTER FOR FITTING AND ADJUSTMENT OF NON-VASCULAR CATHETER: ICD-10-CM

## 2023-01-18 PROCEDURE — 49424 ASSESS CYST CONTRAST INJECT: CPT | Mod: 58

## 2023-01-18 PROCEDURE — 49424 ASSESS CYST CONTRAST INJECT: CPT

## 2023-01-18 PROCEDURE — 76080 X-RAY EXAM OF FISTULA: CPT

## 2023-01-18 PROCEDURE — 76080 X-RAY EXAM OF FISTULA: CPT | Mod: 26

## 2023-01-18 NOTE — ASU DISCHARGE PLAN (ADULT/PEDIATRIC) - NS MD DC FALL RISK RISK
For information on Fall & Injury Prevention, visit: [email or web address] OR  [email or web address] OR  https://www.cdc.gov/steadi/patient.html

## 2023-01-18 NOTE — PROCEDURE NOTE - PROCEDURE FINDINGS AND DETAILS
Drain evaluation demonstrated a moderate cavity . Drain was maintained in place.   NO repeat CT performed due to pt's insurance issues and per discussion with Dr. Cortez.

## 2023-01-18 NOTE — ASU DISCHARGE PLAN (ADULT/PEDIATRIC) - ASU DC SPECIAL INSTRUCTIONSFT
Drain Check    Discharge Instructions  - You have had a drain checked.  - Keep the area clean and dry.  - Do not soak in a tub or pool with the drain, however you may shower with the drain and dressing covered in plastic wrap.  - Do not put traction on the drain and be careful that the drain does not get accidentally dislodged or kinked.  - Record output daily from the drain. Empty the bag as needed.  - You may resume your normal diet.  - You may resume your normal medications.  - It is normal to experience some pain over the site for the next few days. You may take apply ice to the area (20 minutes on, 20 minutes off) and take Tylenol for that pain. Do not take more frequently than every 6 hours and do not exceed more than 3000mg of Tylenol in a 24 hour period.    Notify your primary physician and/or Interventional Radiology IMMEDIATELY if you experience any of the following       - Fever of 101F or 38C       - Chills or Rigors/ Shakes       - Swelling and/or Redness in the area of the puncture site       - Worsening Pain       - Blood soaked bandages or worsening bleeding       - Lightheadedness and/or dizziness upon standing       - Chest Pain/ Tightness       - Shortness of Breath       - Difficulty walking    If you have a problem that you believe requires IMMEDIATE attention, please go to your NEAREST Emergency Room. If you believe your problem can safely wait until you speak to a physician, please call Interventional Radiology for any concerns.    Please feel free to contact us at [phone] if any problems arise. After 6PM, Monday through Friday, on weekends and on holidays, please call [phone] and ask for the radiology resident on call to be paged.

## 2023-01-18 NOTE — ASU DISCHARGE PLAN (ADULT/PEDIATRIC) - NURSING INSTRUCTIONS
Please feel free to contact us at [phone] if any problems arise. After 6PM, Monday through Friday, on weekends and on holidays, please call [phone] and ask for the radiology resident on call to be paged.

## 2023-01-18 NOTE — PRE PROCEDURE NOTE - PRE PROCEDURE EVALUATION
Interventional Radiology    HPI:   Patient is a 57y old  Male who presents with a chief complaint of c spine fluid collection with intractable pain (08 Jan 2023 13:28). S/P C2-T2 posterior spinal fusion with persistent collection s/p drainage on 1/9 here for drain evaluation.      Allergies:   Medications (Abx/Cardiac/Anticoagulation/Blood Products)      Data:    T(C): --  HR: --  BP: --  RR: --  SpO2: --    Other ascites    Neck pain    No significant family history (Father, Mother)    No pertinent past medical history    Chronic back pain    History of contracture of joint    No significant past surgical history    SysAdmin_VstLnk        Exam  General: No acute distress  Chest: Non labored breathing          Imaging:     Plan: 57y Male presents for drain evaluation.   -Risks/Benefits/alternatives explained with the patient and witnessed informed consent obtained.    Interventional Radiology    HPI:   Patient is a 57y old  Male who presents with a chief complaint of c spine fluid collection with intractable pain (08 Jan 2023 13:28). S/P C2-T2 posterior spinal fusion with persistent collection s/p drainage on 1/9 here for drain evaluation. Reports approximately 20cc of out put daily. Flushes with 5cc daily. Denies fever, chills, pain at the site.      Allergies:   Medications (Abx/Cardiac/Anticoagulation/Blood Products)      Data:    T(C): --  HR: --  BP: --  RR: --  SpO2: --    Other ascites    Neck pain    No significant family history (Father, Mother)    No pertinent past medical history    Chronic back pain    History of contracture of joint    No significant past surgical history    SysAdmin_VstLnk        Exam  General: No acute distress  Chest: Non labored breathing          Imaging:     Plan: 57y Male presents for drain evaluation.   -Risks/Benefits/alternatives explained with the patient and witnessed informed consent obtained.

## 2023-01-23 ENCOUNTER — APPOINTMENT (OUTPATIENT)
Dept: ORTHOPEDIC SURGERY | Facility: CLINIC | Age: 58
End: 2023-01-23
Payer: COMMERCIAL

## 2023-01-23 ENCOUNTER — OUTPATIENT (OUTPATIENT)
Dept: OUTPATIENT SERVICES | Facility: HOSPITAL | Age: 58
LOS: 1 days | End: 2023-01-23
Payer: COMMERCIAL

## 2023-01-23 VITALS
BODY MASS INDEX: 23.32 KG/M2 | DIASTOLIC BLOOD PRESSURE: 64 MMHG | HEIGHT: 65 IN | SYSTOLIC BLOOD PRESSURE: 89 MMHG | WEIGHT: 140 LBS

## 2023-01-23 DIAGNOSIS — Z11.52 ENCOUNTER FOR SCREENING FOR COVID-19: ICD-10-CM

## 2023-01-23 LAB — SARS-COV-2 RNA SPEC QL NAA+PROBE: SIGNIFICANT CHANGE UP

## 2023-01-23 PROCEDURE — 72040 X-RAY EXAM NECK SPINE 2-3 VW: CPT

## 2023-01-23 PROCEDURE — C9803: CPT

## 2023-01-23 PROCEDURE — U0005: CPT

## 2023-01-23 PROCEDURE — 99024 POSTOP FOLLOW-UP VISIT: CPT

## 2023-01-23 PROCEDURE — U0003: CPT

## 2023-01-23 RX ORDER — TIZANIDINE 2 MG/1
2 TABLET ORAL EVERY 6 HOURS
Qty: 56 | Refills: 0 | Status: ACTIVE | COMMUNITY
Start: 2023-01-23 | End: 1900-01-01

## 2023-01-23 RX ORDER — GABAPENTIN 100 MG/1
100 CAPSULE ORAL TWICE DAILY
Qty: 28 | Refills: 0 | Status: ACTIVE | COMMUNITY
Start: 2023-01-23 | End: 1900-01-01

## 2023-01-23 NOTE — PHYSICAL EXAM
[de-identified] : Cervical Physical Exam\par \par Gait -slightly antalgic, but he was able to walk approximately 20 feet in the office today with a cane.  His gait has certainly improved as compared to prior to surgery.\par \par Station - Normal\par \par Sagittal balance - Normal \par \par Compensatory mechanism? - None\par \par Horizontal gaze - Maintained\par \par \par Reflexes\par Biceps -hyperreflexic\par Triceps - Normal\par Brachioradialis -hyperreflexic\par Patellar - Normal\par Gastroc - Normal\par \par Shelton´s -positive\par \par Shoulder Exam - Normal\par \par Spurling´s - None\par \par Wrist Pulses -2+ radial/ulnar\par \par Foot Pulses -2+ DP/PT\par \par Cervical range of motion -decreased but can turn had approximately 15 degrees bilaterally\par \par Sensation \par C5-T1 sensation intact to light touch bilaterally\par \par L1-S1 sensation intact to light touch bilaterally\par \par Motor\par \par \par 	Deltoid	Biceps	Triceps	WF	WE	IO	\par Right	4/5	5/5	5/5	5/5	5/5	3/5	3/5\par Left	5/5	5/5	5/5	5/5	5/5	5/5	5/5\par \par As compared to prior to surgery the patient's overall exam has dramatically improved, January 23, 2023\par \par Incision is healed\par \par There is a seroma present, wound is healed, wound is not painful\par 	IP	Quad	HS	TA	Gastroc	EHL\par Right	5/5	5/5	5/5	5/5	5/5	5/5\par Left	5/5	5/5	5/5	5/5	5/5	5/5 [de-identified] : Cervical radiographs\par Hardware in appropriate position\par No acute complication

## 2023-01-23 NOTE — ASSESSMENT
[FreeTextEntry1] : This is a 57-year-old male that is now approximately 3 months out from surgery.  My opinion given the extent of his myelopathy and his degree of disability prior to surgery I am very pleased with the fact that he is walking with a cane and he was able to button his shirt today.  I think these improvements will continue to progress in the right direction.  He should continue following up with plastics closely to get his wound drain.  I will see him back in 4 weeks time.  All questions were answered today.

## 2023-01-23 NOTE — HISTORY OF PRESENT ILLNESS
[de-identified] : Today the patient is now approximately 3 months out from surgery.  Overall he is unremarkably well.  He feels like his hand dexterity is improved.  He is walking much better.  He is still dealing with a sterile seroma which is being followed by plastics.  He denies any bowel bladder issues.  He denies any saddle anesthesia.  He denies any fevers or chills.  Overall he is pleased with his recovery to date.\par \par 12/14/22\par This is a 57-year-old male who is now approximately 5 weeks out from surgery.  Overall he has done well.  He still notes improvements in his hand dexterity and balance.  He does have some mid back pain.  He denies any radicular pain.  He is anxious to start physical therapy to help with his activity level.\par \par 11/30/22\par This is a 57-year-old male that is now approximately 22 days out from a C2-T2 posterior cervical spinal fusion.  Overall he has done remarkably well.  He is walking.  His hand strength has improved.  He feels more steady on his feet.  He denies any issues in terms of bowel bladder function or saddle anesthesia.  Overall he has already noted a significant improvement in his ability to perform his activities of daily living as well.

## 2023-01-24 NOTE — PHYSICAL THERAPY INITIAL EVALUATION ADULT - SITTING BALANCE: STATIC
Assessment:   S/P (orif) Elbow - Olecranon - Left on 1/11/2023    Plan:   Hinge elbow brace for support  Start Physical therapy to work on motion at this time  - active, no passive ROM  2lb weight bearing  OK to resume normal hygiene activities    Follow Up:  4 weeks    To Do Next Visit:  X-rays of the  left  elbow      CHIEF COMPLAINT:  No chief complaint on file  SUBJECTIVE:  Kamla Rivera III is a 47 y o  male who presents for follow up after (orif) Elbow - Olecranon - Left on 1/11/2023  Today patient has pain in the left elbow  He denies any signs of an infection  He denies any numbness or tingling  He denies any other acute complaints  PHYSICAL EXAMINATION:  Vital signs: There were no vitals taken for this visit  General: well developed and well nourished, alert, oriented times 3 and appears comfortable  Psychiatric: Normal    MUSCULOSKELETAL EXAMINATION:  Incision: Clean, dry, intact  Range of Motion: Limited due to pain and Limited due to stiffness  Neurovascular status: Neuro intact, good cap refill  Activity Restrictions: Restrictions: 2lb weightbearing   active motion OK  Done today: Sutures out, steri-strips applied      STUDIES REVIEWED:  No Studies to review      PROCEDURES PERFORMED:  Procedures  No Procedures performed today good balance

## 2023-01-27 ENCOUNTER — OUTPATIENT (OUTPATIENT)
Dept: OUTPATIENT SERVICES | Facility: HOSPITAL | Age: 58
LOS: 1 days | End: 2023-01-27
Payer: COMMERCIAL

## 2023-01-27 ENCOUNTER — TRANSCRIPTION ENCOUNTER (OUTPATIENT)
Age: 58
End: 2023-01-27

## 2023-01-27 ENCOUNTER — RESULT REVIEW (OUTPATIENT)
Age: 58
End: 2023-01-27

## 2023-01-27 VITALS
TEMPERATURE: 98 F | HEIGHT: 62 IN | DIASTOLIC BLOOD PRESSURE: 84 MMHG | WEIGHT: 104.94 LBS | SYSTOLIC BLOOD PRESSURE: 126 MMHG | RESPIRATION RATE: 18 BRPM | HEART RATE: 94 BPM | OXYGEN SATURATION: 100 %

## 2023-01-27 DIAGNOSIS — R93.5 ABNORMAL FINDINGS ON DIAGNOSTIC IMAGING OF OTHER ABDOMINAL REGIONS, INCLUDING RETROPERITONEUM: ICD-10-CM

## 2023-01-27 LAB
GRAM STN FLD: SIGNIFICANT CHANGE UP
SPECIMEN SOURCE: SIGNIFICANT CHANGE UP

## 2023-01-27 PROCEDURE — 87077 CULTURE AEROBIC IDENTIFY: CPT

## 2023-01-27 PROCEDURE — 49423 EXCHANGE DRAINAGE CATHETER: CPT | Mod: 1L

## 2023-01-27 PROCEDURE — C1729: CPT

## 2023-01-27 PROCEDURE — 75984 XRAY CONTROL CATHETER CHANGE: CPT

## 2023-01-27 PROCEDURE — 87075 CULTR BACTERIA EXCEPT BLOOD: CPT

## 2023-01-27 PROCEDURE — C1769: CPT

## 2023-01-27 PROCEDURE — 87186 SC STD MICRODIL/AGAR DIL: CPT

## 2023-01-27 PROCEDURE — 87205 SMEAR GRAM STAIN: CPT

## 2023-01-27 PROCEDURE — 49423 EXCHANGE DRAINAGE CATHETER: CPT

## 2023-01-27 PROCEDURE — 87070 CULTURE OTHR SPECIMN AEROBIC: CPT

## 2023-01-27 PROCEDURE — 75984 XRAY CONTROL CATHETER CHANGE: CPT | Mod: 26,1L

## 2023-01-27 NOTE — ASU PREOP CHECKLIST - TEMPERATURE IN CELSIUS (DEGREES C)
Patient is a 75y old  Female who presents with a chief complaint of complain of abdominal pain (02 Aug 2018 04:45)    HPI:  74 y/o female with a PMHx of dementia, anxiety presents to the ED c/o abd pain, bruise on left eye. Hx given by daughters due to pt's mental status. Pt was agitated, swinging, trying to bite staff, and uncooperative with physical exam. Haldol and Ativan were given. Unable to obtain a complete Hx secondary to pt's dementia, medical sedation. Here, noted with 11.5, UA positive, xray no obvious infiltrate or PNA was given IV zosyn d/t concern for infection.       PMH: as above  PSH: as above  Meds: per reconciliation sheet, noted below  MEDICATIONS  (STANDING):  ALPRAZolam 0.25 milliGRAM(s) Oral three times a day  cefTRIAXone Injectable. 1000 milliGRAM(s) IV Push every 24 hours  diVALproex Sprinkle 125 milliGRAM(s) Oral two times a day  docusate sodium 100 milliGRAM(s) Oral daily  dorzolamide 2%/timolol 0.5% Ophthalmic Solution 1 Drop(s) Both EYES two times a day  heparin  Injectable 5000 Unit(s) SubCutaneous every 8 hours  melatonin 3 milliGRAM(s) Oral at bedtime  PARoxetine 10 milliGRAM(s) Oral daily  QUEtiapine 100 milliGRAM(s) Oral at bedtime  QUEtiapine 25 milliGRAM(s) Oral daily  senna 2 Tablet(s) Oral at bedtime      Allergies    Allergy Status Unknown  No Known Allergies    Intolerances      Social: no smoking, no alcohol, no illegal drugs; no recent travel, no exposure to TB  FAMILY HISTORY:     no history of premature cardiovascular disease in first degree relatives    ROS: unable to obtain d/t medical condition   All other systems reviewed and are negative    Vital Signs Last 24 Hrs  T(C): 37.1 (02 Aug 2018 11:52), Max: 38.7 (01 Aug 2018 15:50)  T(F): 98.8 (02 Aug 2018 11:52), Max: 101.6 (01 Aug 2018 15:50)  HR: 79 (02 Aug 2018 11:52) (74 - 91)  BP: 119/55 (02 Aug 2018 11:52) (105/68 - 122/53)  BP(mean): --  RR: 18 (02 Aug 2018 11:52) (16 - 19)  SpO2: 100% (02 Aug 2018 11:52) (98% - 100%)  Daily         PE:    Constitutional: frail looking  HEENT: NC/AT, EOMI, PERRLA, conjunctivae clear; ears and nose atraumatic; pharynx benign, L periorbital bruising, edema  Neck: supple; thyroid not palpable  Back: no tenderness  Respiratory: decreased breath sounds  Cardiovascular: S1S2 regular, no murmurs  Abdomen: soft, not tender, not distended, positive BS; liver and spleen WNL  Genitourinary: no suprapubic tenderness  Lymphatic: no LN palpable  Musculoskeletal: no muscle tenderness, no joint swelling or tenderness  Extremities: no pedal edema  Neurological/ Psychiatric:  moving all extremities  Skin: no rashes; no palpable lesions    Labs: all available labs reviewed                        .56  )-----------( 165      ( 02 Aug 2018 06:06 )             33.6     08-    145  |  112<H>  |  9   ----------------------------<  82  3.6   |  26  |  0.47<L>    Ca    8.1<L>      02 Aug 2018 06:06    TPro  6.6  /  Alb  3.0<L>  /  TBili  0.5  /  DBili  x   /  AST  32  /  ALT  19  /  AlkPhos  98  08-01     LIVER FUNCTIONS - ( 01 Aug 2018 14:41 )  Alb: 3.0 g/dL / Pro: 6.6 gm/dL / ALK PHOS: 98 U/L / ALT: 19 U/L / AST: 32 U/L / GGT: x           Urinalysis Basic - ( 01 Aug 2018 18:44 )    Color: Yellow / Appearance: very cloudy / S.015 / pH: x  Gluc: x / Ketone: Moderate  / Bili: Negative / Urobili: Negative mg/dL   Blood: x / Protein: 100 mg/dL / Nitrite: Negative   Leuk Esterase: Trace / RBC: >50 /HPF / WBC 3-5   Sq Epi: x / Non Sq Epi: Few / Bacteria: Many        Radiology: all available radiological tests reviewed  < from: CT 3D Reconstruct w/ Workstation (18 @ 14:57) >    EXAM:  CT ORBITS                          EXAM:  CT 3D RECONSTRUCT W INES                            PROCEDURE DATE:  2018          INTERPRETATION:      CT orbits without IV contrast         CLINICAL INFORMATION:  Fall LEFT periorbital bruising Orbital swelling,   fracture.    TECHNIQUE:  Contiguous axial 2 mm sections were reconstructed through the   paranasal sinuses using a single helical acquisition obtained at .5 mm   thickness.  Imaging post processing software was employed to generate   reformatted images in 2 mm sagittal and coronal imaging planes.  This   scan was performed using automatic exposure control (radiation dose   reduction software) to obtain a diagnostic image quality scan with   patient dose as low as reasonably achievable.         FINDINGS:   No prior similar studies are available for review.    The orbits are significant for a LEFT-sided glaucoma drainage device in   the superotemporal region.  Minimal LEFT lateral periorbital soft tissue   swelling isnoted. Preseptal structures are preserved.  The globes are   otherwise intact.  Intraconal and extraconal fat is preserved.  The   extraocular muscles remain symmetric.  The superior ophthalmic veins are   also symmetric.  Orbital rims remain intact.    The paranasal sinuses are clear.  No abnormal paranasal sinus fluid   collection is found.  No osseous erosion or expansion is present.     The nasal septum appears intact.  The ostiomeatal complexes appear   normally formed.    The deep facial spaces are intact.   No radiopaque foreign body is seen.    The nasopharynx is symmetric.  The central skull base is intact.  The   visualized intracranial contents appear unremarkable.           IMPRESSION:   Mild LEFT periorbital soft tissue swelling.   No fracture.         Advanced directives addressed: full resuscitation CC: 75 y old  Female who presents with a chief complaint of complain of abdominal pain (03 Aug 2018 13:52)    HPI:  74 y/o female with a PMHx of dementia, anxiety presents to the ED c/o abd pain, bruise on left eye. As per ED physicians note; history given by daughters, pt was agitated, swinging, trying to bite staff, and uncooperative with physical exam. In ED. Haldol and Ativan were given. Pt noted to have positive UA, was given IV zosyn     Neurology consulted for eval of cognitive decline; Pt unable to provide history, she is tearful, looks sad, barely talks. As per pts husbnad and son, pt has had evolving cognitive and behavioral changes since past 2 years, she needs assistance, can barely take care of her ADL's, she has been seeing a psychiatrist, is on Seroquel, Depakote and Paroxetine.     PAST MEDICAL & SURGICAL HISTORY:  Anxiety  Canaloplasty Left eye  History of Tonsillectomy  Hernia: inguinal    FAMILY HISTORY: unable to obtain    Social Hx:  Nonsmoker, no drug or alcohol use    MEDICATIONS  (STANDING):  ALPRAZolam 0.25 milliGRAM(s) Oral three times a day  diVALproex Sprinkle 125 milliGRAM(s) Oral two times a day  docusate sodium 100 milliGRAM(s) Oral daily  dorzolamide 2%/timolol 0.5% Ophthalmic Solution 1 Drop(s) Both EYES two times a day  heparin  Injectable 5000 Unit(s) SubCutaneous every 8 hours  melatonin 3 milliGRAM(s) Oral at bedtime  PARoxetine 10 milliGRAM(s) Oral daily  QUEtiapine 100 milliGRAM(s) Oral at bedtime  QUEtiapine 25 milliGRAM(s) Oral daily  senna 2 Tablet(s) Oral at bedtime     Allergies  Allergy Status Unknown  No Known Allergies    Intolerances    ROS: Pertinent positives in HPI, all other ROS unable to obtain.      Vital Signs Last 24 Hrs  T(C): 36.8 (03 Aug 2018 11:26), Max: 37.3 (03 Aug 2018 06:01)  T(F): 98.2 (03 Aug 2018 11:26), Max: 99.2 (03 Aug 2018 06:01)  HR: 75 (03 Aug 2018 11:26) (68 - 75)  BP: 119/51 (03 Aug 2018 11:26) (119/50 - 119/51)  BP(mean): --  RR: 18 (03 Aug 2018 11:26) (18 - 18)  SpO2: 99% (03 Aug 2018 11:26) (98% - 99%)    GE:  Constitutional: Frail looking female, tearful and seems depressed.  HEENT: PERRLA, EOMI,   Neck: Supple.  Respiratory: Breath sounds are clear bilaterally  Cardiovascular: S1 and S2, regular   Extremities:  no edema  Vascular: Caritid Bruit - no  Musculoskeletal: no joint swelling/tenderness, no abnormal movements  Skin: No rashes    Neurological exam:  HF: Alert, inattentive, follows simple commands, tearful, limited speech - fluent, no Aphasia. Naming intact   CN: HARRISON, EOMI, VFF, facial sensation normal, no NLFD, tongue midline, Palate moves equally,  Motor: No pronator drift, moves 4 ext antigravity, no tremor, rigidity.    Sens /co-ord : limited exam , pt not participating    Reflexes: Symmetric and normal .  downgoing toes b/l  Gait/Balance: Cannot test    Labs:   08-02    145  |  112<H>  |  9   ----------------------------<  82  3.6   |  26  |  0.47<L>    Ca    8.1<L>      02 Aug 2018 06:06                        11.1   6.56  )-----------( 165      ( 02 Aug 2018 06:06 )             33.6       Radiology:  - CT Head: < from: CT Head No Cont (08.01.18 @ 14:55) >  mild periventricular and bifrontal subcortical white matter   ischemia      < end of copied text > 36.4

## 2023-01-27 NOTE — ASU DISCHARGE PLAN (ADULT/PEDIATRIC) - NURSING INSTRUCTIONS
Please feel free to contact us at [phone] if any problems arise. After 6PM, Monday through Friday, on weekends and on holidays, please call [phone] and ask for the radiology resident on call to be paged.

## 2023-01-27 NOTE — PRE PROCEDURE NOTE - PRE PROCEDURE EVALUATION
Interventional Radiology    HPI: 57y Male with a chief complaint of C- spine fluid collection with intractable pain S/P C2-T2 posterior spinal fusion with persistent fluid collection here for drainage. Patient reports no output from drain. patient is here today for drain evaluation    Allergies:   Medications (Abx/Cardiac/Anticoagulation/Blood Products)      Data:  157.5  47.6  T(C): 36.4  HR: 94  BP: 126/84  RR: 18  SpO2: 100%    Exam  General: No acute distress  Chest: Non labored breathing  Abdomen: Non-distended  Extremities: No swelling, warm      Plan: 57y Male presents for Abscess drain evaluation  -Risks/Benefits/alternatives explained with the patient and/or healthcare proxy and witnessed informed consent obtained.

## 2023-01-27 NOTE — ASU DISCHARGE PLAN (ADULT/PEDIATRIC) - ASU DC SPECIAL INSTRUCTIONSFT
Drain Check    Discharge Instructions  - You have had a drain checked.  - Keep the area clean and dry.  - Do not soak in a tub or pool with the drain, however you may shower with the drain and dressing covered in plastic wrap.  - Do not put traction on the drain and be careful that the drain does not get accidentally dislodged or kinked.  - Record output daily from the drain. Empty the bag as needed.  - You may resume your normal diet.  - You may resume your normal medications.  - It is normal to experience some pain over the site for the next few days. You may take apply ice to the area (20 minutes on, 20 minutes off) and take Tylenol for that pain. Do not take more frequently than every 6 hours and do not exceed more than 3000mg of Tylenol in a 24 hour period.    Notify your primary physician and/or Interventional Radiology IMMEDIATELY if you experience any of the following       - Fever of 100.4F  or 38C       - Chills or Rigors/ Shakes       - Swelling and/or Redness in the area of the puncture site       - Worsening Pain       - Blood soaked bandages or worsening bleeding       - Lightheadedness and/or dizziness upon standing       - Chest Pain/ Tightness       - Shortness of Breath       - Difficulty walking    If you have a problem that you believe requires IMMEDIATE attention, please go to your NEAREST Emergency Room. If you believe your problem can safely wait until you speak to a physician, please call Interventional Radiology for any concerns.    During Normal Weekday Business Hours- You can contact the Interventional Radiology department during normal business hours via telephone.  During Evenings and Weekends- If you need to contact Interventional Radiology during off hours, do so by calling the hospital and requesting to be connected to the Interventional Radiologist on call. Drain Check/exchange/upsize    Discharge Instructions  - You have had a drain checked.  - Keep the area clean and dry.  - Do not soak in a tub or pool with the drain, however you may shower with the drain and dressing covered in plastic wrap.  - Do not put traction on the drain and be careful that the drain does not get accidentally dislodged or kinked.  - Record output daily from the drain. Empty the bag as needed.  - You may resume your normal diet.  - You may resume your normal medications.  - It is normal to experience some pain over the site for the next few days. You may take apply ice to the area (20 minutes on, 20 minutes off) and take Tylenol for that pain. Do not take more frequently than every 6 hours and do not exceed more than 3000mg of Tylenol in a 24 hour period.    Notify your primary physician and/or Interventional Radiology IMMEDIATELY if you experience any of the following       - Fever of 100.4F  or 38C       - Chills or Rigors/ Shakes       - Swelling and/or Redness in the area of the puncture site       - Worsening Pain       - Blood soaked bandages or worsening bleeding       - Lightheadedness and/or dizziness upon standing       - Chest Pain/ Tightness       - Shortness of Breath       - Difficulty walking    If you have a problem that you believe requires IMMEDIATE attention, please go to your NEAREST Emergency Room. If you believe your problem can safely wait until you speak to a physician, please call Interventional Radiology for any concerns.    During Normal Weekday Business Hours- You can contact the Interventional Radiology department during normal business hours via telephone.  During Evenings and Weekends- If you need to contact Interventional Radiology during off hours, do so by calling the hospital and requesting to be connected to the Interventional Radiologist on call.

## 2023-01-27 NOTE — ASU PATIENT PROFILE, ADULT - FALL HARM RISK - HARM RISK INTERVENTIONS

## 2023-01-27 NOTE — PROCEDURE NOTE - PROCEDURE FINDINGS AND DETAILS
Attempted to inject contrast into existing 8.5fr MPD drain, drain partially clogged. Ultrasound examination demonstrated moderate cavity with drain in place. Drain then removed over wire and upsized to 10.2Fr MPD drain. 120cc serous fluid removed and sent for culture. Follow up ultrasound examination shows small cavity with drain in place. Drain placed to CIELO bulb and sutured drain in place. STAYFIX dressing placed. Full report to follow.

## 2023-01-29 LAB
-  AMPICILLIN/SULBACTAM: SIGNIFICANT CHANGE UP
-  CEFAZOLIN: SIGNIFICANT CHANGE UP
-  CLINDAMYCIN: SIGNIFICANT CHANGE UP
-  ERYTHROMYCIN: SIGNIFICANT CHANGE UP
-  GENTAMICIN: SIGNIFICANT CHANGE UP
-  OXACILLIN: SIGNIFICANT CHANGE UP
-  PENICILLIN: SIGNIFICANT CHANGE UP
-  RIFAMPIN: SIGNIFICANT CHANGE UP
-  TETRACYCLINE: SIGNIFICANT CHANGE UP
-  TRIMETHOPRIM/SULFAMETHOXAZOLE: SIGNIFICANT CHANGE UP
-  VANCOMYCIN: SIGNIFICANT CHANGE UP
METHOD TYPE: SIGNIFICANT CHANGE UP

## 2023-02-01 LAB
CULTURE RESULTS: SIGNIFICANT CHANGE UP
ORGANISM # SPEC MICROSCOPIC CNT: SIGNIFICANT CHANGE UP
ORGANISM # SPEC MICROSCOPIC CNT: SIGNIFICANT CHANGE UP
SPECIMEN SOURCE: SIGNIFICANT CHANGE UP

## 2023-02-07 ENCOUNTER — RESULT REVIEW (OUTPATIENT)
Age: 58
End: 2023-02-07

## 2023-02-07 ENCOUNTER — TRANSCRIPTION ENCOUNTER (OUTPATIENT)
Age: 58
End: 2023-02-07

## 2023-02-07 ENCOUNTER — OUTPATIENT (OUTPATIENT)
Dept: OUTPATIENT SERVICES | Facility: HOSPITAL | Age: 58
LOS: 1 days | End: 2023-02-07
Payer: COMMERCIAL

## 2023-02-07 VITALS
TEMPERATURE: 98 F | OXYGEN SATURATION: 95 % | HEIGHT: 62 IN | HEART RATE: 115 BPM | WEIGHT: 147.93 LBS | DIASTOLIC BLOOD PRESSURE: 81 MMHG | RESPIRATION RATE: 16 BRPM | SYSTOLIC BLOOD PRESSURE: 121 MMHG

## 2023-02-07 DIAGNOSIS — Z46.82 ENCOUNTER FOR FITTING AND ADJUSTMENT OF NON-VASCULAR CATHETER: ICD-10-CM

## 2023-02-07 DIAGNOSIS — T81.89XA OTHER COMPLICATIONS OF PROCEDURES, NOT ELSEWHERE CLASSIFIED, INITIAL ENCOUNTER: ICD-10-CM

## 2023-02-07 DIAGNOSIS — K65.1 PERITONEAL ABSCESS: ICD-10-CM

## 2023-02-07 DIAGNOSIS — T88.8XXA OTHER SPECIFIED COMPLICATIONS OF SURGICAL AND MEDICAL CARE, NOT ELSEWHERE CLASSIFIED, INITIAL ENCOUNTER: ICD-10-CM

## 2023-02-07 PROCEDURE — 49424 ASSESS CYST CONTRAST INJECT: CPT

## 2023-02-07 PROCEDURE — 76080 X-RAY EXAM OF FISTULA: CPT | Mod: 26

## 2023-02-07 PROCEDURE — C1769: CPT

## 2023-02-07 PROCEDURE — 76080 X-RAY EXAM OF FISTULA: CPT

## 2023-02-07 NOTE — ASU PATIENT PROFILE, ADULT - CENTRAL VENOUS CATHETER
[Seasonal Allergies] : seasonal allergies [As Noted in HPI] : as noted in HPI [Negative] : Head and Neck no

## 2023-02-07 NOTE — ASU PATIENT PROFILE, ADULT - FALL HARM RISK - UNIVERSAL INTERVENTIONS
Bed in lowest position, wheels locked, appropriate side rails in place/Call bell, personal items and telephone in reach/Instruct patient to call for assistance before getting out of bed or chair/Non-slip footwear when patient is out of bed/[place] to call system/Physically safe environment - no spills, clutter or unnecessary equipment/Purposeful Proactive Rounding/Room/bathroom lighting operational, light cord in reach

## 2023-02-07 NOTE — PROCEDURE NOTE - PROCEDURE FINDINGS AND DETAILS
Contrast injection demonstrated moderate amorphic cavity with drain at edge of cavity. Since drain outputs consistently >30cc per day, 10.2fr drain was maintained in place. Attached to gravity bag. Sterile dressing applied.

## 2023-02-07 NOTE — PROCEDURE NOTE - PLAN
- continue flushes as ordered  - f/u appointment made for 2/17 @10:30am for evaluation - continue flushes as ordered  - f/u appointment made for 2/17 @10:30am for evaluation  - case d/w Dr. Veras

## 2023-02-07 NOTE — PRE PROCEDURE NOTE - PRE PROCEDURE EVALUATION
Interventional Radiology    HPI: 57y Male with a chief complaint of C- spine fluid collection with intractable pain S/P C2-T2 posterior spinal fusion with persistent fluid collection s/p posterior cervical fluid collection drainage on 1/9/23. Last drain check on 1/27 with exchange. Patient presents today for drain evaluation.      Allergies:   Medications (Abx/Cardiac/Anticoagulation/Blood Products)      Data:    T(C): --  HR: --  BP: --  RR: --  SpO2: --    Abscess of peritoneum    Other specified complications of surgical and medical care, not elsewhere classified, initial encounter    No significant family history (Father, Mother)    No pertinent past medical history    Chronic back pain    History of contracture of joint    No significant past surgical history    SysAdmin_VstLnk        Exam  General: No acute distress  Chest: Non labored breathing          Imaging:     Plan: 57y Male presents for Abscess drain evaluation.  -Risks/Benefits/alternatives explained with the patient and/or healthcare proxy and witnessed informed consent obtained.    Interventional Radiology    HPI: 57y Male with a chief complaint of C- spine fluid collection with intractable pain S/P C2-T2 posterior spinal fusion with persistent fluid collection s/p posterior cervical fluid collection drainage on 1/9/23. Last drain check on 1/27 with exchange. Patient presents today for drain evaluation.  Patient states he has about 30cc of drainage per day from drain. Serosanguinous.    Allergies:   Medications (Abx/Cardiac/Anticoagulation/Blood Products)      Data:    T(C): --  HR: --  BP: --  RR: --  SpO2: --    Abscess of peritoneum    Other specified complications of surgical and medical care, not elsewhere classified, initial encounter    No significant family history (Father, Mother)    No pertinent past medical history    Chronic back pain    History of contracture of joint    No significant past surgical history    SysAdmin_VstLnk        Exam  General: No acute distress  Chest: Non labored breathing          Imaging:     Plan: 57y Male presents for Abscess drain evaluation.  -Risks/Benefits/alternatives explained with the patient and/or healthcare proxy and witnessed informed consent obtained.

## 2023-02-07 NOTE — ASU DISCHARGE PLAN (ADULT/PEDIATRIC) - NS MD DC FALL RISK RISK
For information on Fall & Injury Prevention, visit: [email or web address] OR  [email or web address] OR  https://www.cdc.gov/steadi/patient.html

## 2023-02-08 NOTE — CHART NOTE - NSCHARTNOTEFT_GEN_A_CORE
Interventional Radiology     Patient called that drain has not had output from drain since yesterdays drain evaluation. No leaking at site of drain. No pain at site. Patient educated that drain was taken off suction and placed to gravity bag yesterday to help facilitate healing of cavity and that decreased drainage is expected. Patient told to stop flushing drain until next evaluation since there is minimal output.     Patient very upset and abrasive over phone because he does not want his insurance paying for this repeated drain evaluation process and he cannot pay for transportation to come to IR every 7-10 days for drain checks.  Drain follow-up process was explained in detail multiple times to patient. Discussed with  that he does not qualify for any transportation programs at this time due to age. RN and I both were hung up on by patient today on separate occasions.     - Discussed case with Dr. Veras, patient aware to stop flushing drain until next drain evaluation that is scheduled for 2/17. Offered to make appointment earlier next week but patient refused.     ----  Please call [phone] with any questions or concerns. Interventional Radiology     Patient called that drain has not had output from drain since yesterdays drain evaluation. No leaking at site of drain. No pain at site. Patient educated that drain was taken off suction and placed to gravity bag yesterday to help facilitate healing of cavity and that decreased drainage is expected. Patient told to stop flushing drain until next evaluation since there is minimal output.     Patient very upset and abrasive over phone because he does not want his insurance paying for this repeated drain evaluation process and he cannot pay for transportation to come to IR every 7-10 days for drain checks.  Drain follow-up process was explained in detail multiple times to patient. Discussed with  who informed me that he does not qualify for any transportation programs at this time due to age. RN and I both were hung up on by patient today on separate occasions.     - Discussed case with Dr. Veras, patient aware to stop flushing drain until next drain evaluation that is scheduled for 2/17. Offered to make appointment earlier next week but patient refused.     ----  Please call [phone] with any questions or concerns.

## 2023-02-17 ENCOUNTER — OUTPATIENT (OUTPATIENT)
Dept: OUTPATIENT SERVICES | Facility: HOSPITAL | Age: 58
LOS: 1 days | End: 2023-02-17
Payer: COMMERCIAL

## 2023-02-17 ENCOUNTER — TRANSCRIPTION ENCOUNTER (OUTPATIENT)
Age: 58
End: 2023-02-17

## 2023-02-17 ENCOUNTER — RESULT REVIEW (OUTPATIENT)
Age: 58
End: 2023-02-17

## 2023-02-17 VITALS
RESPIRATION RATE: 18 BRPM | TEMPERATURE: 98 F | DIASTOLIC BLOOD PRESSURE: 74 MMHG | OXYGEN SATURATION: 100 % | SYSTOLIC BLOOD PRESSURE: 106 MMHG | HEART RATE: 85 BPM

## 2023-02-17 DIAGNOSIS — Z46.82 ENCOUNTER FOR FITTING AND ADJUSTMENT OF NON-VASCULAR CATHETER: ICD-10-CM

## 2023-02-17 DIAGNOSIS — M54.2 CERVICALGIA: ICD-10-CM

## 2023-02-17 DIAGNOSIS — T88.8XXA OTHER SPECIFIED COMPLICATIONS OF SURGICAL AND MEDICAL CARE, NOT ELSEWHERE CLASSIFIED, INITIAL ENCOUNTER: ICD-10-CM

## 2023-02-17 DIAGNOSIS — R18.8 OTHER ASCITES: ICD-10-CM

## 2023-02-17 PROCEDURE — 49424 ASSESS CYST CONTRAST INJECT: CPT

## 2023-02-17 PROCEDURE — 76080 X-RAY EXAM OF FISTULA: CPT

## 2023-02-17 PROCEDURE — 76080 X-RAY EXAM OF FISTULA: CPT | Mod: 26

## 2023-02-17 NOTE — ASU DISCHARGE PLAN (ADULT/PEDIATRIC) - NURSING INSTRUCTIONS
Please feel free to contact us at [phone] if any problems arise. After 6PM, Monday through Friday, on weekends and on holidays, please call [phone] and ask for the radiology resident on call to be paged.

## 2023-02-17 NOTE — ASU DISCHARGE PLAN (ADULT/PEDIATRIC) - ASU DC SPECIAL INSTRUCTIONSFT
Drain removal; cervical    Discharge Instructions  - You have had a drain removed  - Keep the area clean and dry.  - Do not soak in a tub or pool with the drain, however you may shower with the drain and dressing covered in plastic wrap.  -   - You may resume your normal diet.  - You may resume your normal medications.  - It is normal to experience some pain over the site for the next few days. You may take apply ice to the area (20 minutes on, 20 minutes off) and take Tylenol for that pain. Do not take more frequently than every 6 hours and do not exceed more than 3000mg of Tylenol in a 24 hour period.    Notify your primary physician and/or Interventional Radiology IMMEDIATELY if you experience any of the following       - Fever of 100.4F  or 38C       - Chills or Rigors/ Shakes       - Swelling and/or Redness in the area of the puncture site       - Worsening Pain       - Blood soaked bandages or worsening bleeding       - Lightheadedness and/or dizziness upon standing       - Chest Pain/ Tightness       - Shortness of Breath       - Difficulty walking    If you have a problem that you believe requires IMMEDIATE attention, please go to your NEAREST Emergency Room. If you believe your problem can safely wait until you speak to a physician, please call Interventional Radiology for any concerns.    During Normal Weekday Business Hours- You can contact the Interventional Radiology department during normal business hours via telephone.  During Evenings and Weekends- If you need to contact Interventional Radiology during off hours, do so by calling the hospital and requesting to be connected to the Interventional Radiologist on call.

## 2023-02-17 NOTE — PRE PROCEDURE NOTE - PRE PROCEDURE EVALUATION
Interventional Radiology    HPI: 57y Male with a chief complaint of C- spine fluid collection with intractable pain S/P C2-T2 posterior spinal fusion with persistent fluid collection s/p posterior cervical fluid collection drainage on 1/9/23. Last drain check on 1/27 with exchange.   LAST APPT ON 2/7  PT REPORTED 30CC OF Serosanguinous DRAINAGE. TODAY 2/1717 PT REPORTS 2CC OF Serosanguinous DRAINAGE.  HAS NOT BEEN FLUSHING TUBE SINCE LAST APPT AS DISCUSSED.   PRESENTS TO IR 2/17 TODAY FOR DRAIN CHECK.  DENIES F/C/N/V/D.          NPO:     N/A    Allergies: NKDA  Medications (Abx/Cardiac/Anticoagulation/Blood Products)      Data:    T(C): 36.6  HR: 85  BP: 106/74  RR: 18  SpO2: 100%         Exam  General: No acute distress  NECK: CERVICAL DRAIN TO GRAVITY BAG.  2CC OF CLEAR YELLOW SEROSANGUINOUS FLUID IN GRAVITY BAG  Chest: Non labored breathing          Imaging:   reviewed      Plan: 57y Male presents for cervical drain evaluation AND REMOVAL   -Risks/Benefits/alternatives explained with the patient and/or healthcare proxy and witnessed informed consent obtained.    Interventional Radiology    HPI: 57y Male with a chief complaint of C- spine fluid collection with intractable pain S/P C2-T2 posterior spinal fusion with persistent fluid collection s/p posterior cervical fluid collection drainage on 1/9/23. Last drain check on 1/27 with exchange.   LAST APPT ON 2/7  PT REPORTED 30CC OF Serosanguinous DRAINAGE. TODAY 2/17 PT REPORTS 2CC OF Serosanguinous DRAINAGE.  HAS NOT BEEN FLUSHING TUBE SINCE LAST APPT AS DISCUSSED.   PRESENTS TO IR 2/17 TODAY FOR DRAIN CHECK.  DENIES F/C/N/V/D.          NPO:     N/A    Allergies: NKDA  Medications (Abx/Cardiac/Anticoagulation/Blood Products)      Data:    T(C): 36.6  HR: 85  BP: 106/74  RR: 18  SpO2: 100%         Exam  General: No acute distress  NECK: CERVICAL DRAIN TO GRAVITY BAG.  2CC OF CLEAR YELLOW SEROSANGUINOUS FLUID IN GRAVITY BAG  Chest: Non labored breathing          Imaging:   reviewed      Plan: 57y Male presents for cervical drain evaluation AND REMOVAL   -Risks/Benefits/alternatives explained with the patient and/or healthcare proxy and witnessed informed consent obtained.

## 2023-02-17 NOTE — PROCEDURE NOTE - PROCEDURE FINDINGS AND DETAILS
__  __      contrast injection demonstrated leaking from exit site. resistance into tubing.   Drain removed.   full  report to follow

## 2023-02-17 NOTE — ASU DISCHARGE PLAN (ADULT/PEDIATRIC) - NS MD DC FALL RISK RISK
For information on Fall & Injury Prevention, visit: [email or web address] OR  [email or web address] OR  https://www.cdc.gov/steadi/patient.html

## 2023-02-17 NOTE — ASU PATIENT PROFILE, ADULT - FALL HARM RISK - HARM RISK INTERVENTIONS

## 2023-02-17 NOTE — PROCEDURE NOTE - PLAN
__    patient asymptomatic ,  afebrile, trace 2cc clear yellow output x 3 days. seen in gravity bag.   vitals stable.     Drain removed.     above plan d/w Dr. Veras

## 2023-02-27 ENCOUNTER — APPOINTMENT (OUTPATIENT)
Dept: ORTHOPEDIC SURGERY | Facility: CLINIC | Age: 58
End: 2023-02-27
Payer: COMMERCIAL

## 2023-02-27 VITALS
BODY MASS INDEX: 23.32 KG/M2 | DIASTOLIC BLOOD PRESSURE: 82 MMHG | HEIGHT: 65 IN | WEIGHT: 140 LBS | SYSTOLIC BLOOD PRESSURE: 116 MMHG

## 2023-02-27 PROCEDURE — 72050 X-RAY EXAM NECK SPINE 4/5VWS: CPT

## 2023-02-27 PROCEDURE — 99214 OFFICE O/P EST MOD 30 MIN: CPT

## 2023-02-27 NOTE — HISTORY OF PRESENT ILLNESS
[de-identified] : Today the patient presents approximately 4 months out from surgery.  Overall he has done remarkably well in terms of his gait and overall functionality.  Prior to surgery he was unable to walk.  He also had substantial issues with movement of his hand.  He states that he is slowly getting more strength and functionality in his hands.  He is now walking with a cane.  Overall he feels like he is improving clinically.  The seroma that he had after surgery has been successfully treated with an IR placed drain.  That drain has been removed.\par \par 1/23\par Today the patient is now approximately 3 months out from surgery.  Overall he is unremarkably well.  He feels like his hand dexterity is improved.  He is walking much better.  He is still dealing with a sterile seroma which is being followed by plastics.  He denies any bowel bladder issues.  He denies any saddle anesthesia.  He denies any fevers or chills.  Overall he is pleased with his recovery to date.\par \par 12/14/22\par This is a 57-year-old male who is now approximately 5 weeks out from surgery.  Overall he has done well.  He still notes improvements in his hand dexterity and balance.  He does have some mid back pain.  He denies any radicular pain.  He is anxious to start physical therapy to help with his activity level.\par \par 11/30/22\par This is a 57-year-old male that is now approximately 22 days out from a C2-T2 posterior cervical spinal fusion.  Overall he has done remarkably well.  He is walking.  His hand strength has improved.  He feels more steady on his feet.  He denies any issues in terms of bowel bladder function or saddle anesthesia.  Overall he has already noted a significant improvement in his ability to perform his activities of daily living as well.

## 2023-02-27 NOTE — PHYSICAL EXAM
[de-identified] : Cervical Physical Exam\par \par Gait -slightly antalgic, but the patient was easily able to walk approximately 50 feet in the office today with a cane.\par \par Station - Normal\par \par Sagittal balance - Normal \par \par Compensatory mechanism? - None\par \par Horizontal gaze - Maintained\par \par \par Reflexes\par Biceps -hyperreflexic\par Triceps - Normal\par Brachioradialis -hyperreflexic\par Patellar - Normal\par Gastroc - Normal\par \par Shelton´s -positive\par \par Shoulder Exam - Normal\par \par Spurling´s - None\par \par Wrist Pulses -2+ radial/ulnar\par \par Foot Pulses -2+ DP/PT\par \par Cervical range of motion -decreased but can turn had approximately 15 degrees bilaterally\par \par Sensation \par C5-T1 sensation intact to light touch bilaterally\par \par L1-S1 sensation intact to light touch bilaterally\par \par Motor\par \par \par 	Deltoid	Biceps	Triceps	WF	WE	IO	\par Right	5/5	5/5	5/5	5/5	5/5	4/5	4/5\par Left	5/5	5/5	5/5	5/5	5/5	5/5	5/5\par \par As compared to prior to surgery the patient's overall exam has dramatically improved\par \par 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

## 2023-02-27 NOTE — ASSESSMENT
[FreeTextEntry1] : I had a long discussion today with the patient.  Overall he has done remarkably well given the fact that he had originally presented to the [facility] with signs and symptoms of severe cervical spondylotic myelopathy.  I would encourage him to continue with physical therapy, Occupational Therapy to maximize improvement after surgery.  We will work with him and what ever sort of disability paperwork he needs to have filled out as well.  I will see him back in approximately 3 months.  I encouraged him to reach out to me directly at any point if his symptoms worsen or change in any way.

## 2023-03-14 NOTE — PATIENT PROFILE ADULT - TOBACCO USE
This RN spoke with cardiology notified no cardiac intervention , low risk CHF , inforemd to notify physician of muscle pain, depression , and need for pain control .  Ok to continue lasix Never smoker

## 2023-03-17 NOTE — ASU PATIENT PROFILE, ADULT - AS SC BRADEN MOISTURE
(4) rarely moist [Feeling Tired] : feeling tired [Lower Ext Edema] : lower extremity edema [Joint Pain] : joint pain [Joint Stiffness] : joint stiffness [Joint Swelling] : joint swelling [Sleep Disturbances] : sleep disturbances

## 2023-05-31 ENCOUNTER — APPOINTMENT (OUTPATIENT)
Dept: ORTHOPEDIC SURGERY | Facility: CLINIC | Age: 58
End: 2023-05-31
Payer: COMMERCIAL

## 2023-05-31 VITALS
HEIGHT: 65 IN | DIASTOLIC BLOOD PRESSURE: 86 MMHG | BODY MASS INDEX: 23.32 KG/M2 | WEIGHT: 140 LBS | SYSTOLIC BLOOD PRESSURE: 126 MMHG

## 2023-05-31 PROCEDURE — 72050 X-RAY EXAM NECK SPINE 4/5VWS: CPT

## 2023-05-31 PROCEDURE — 99214 OFFICE O/P EST MOD 30 MIN: CPT

## 2023-05-31 RX ORDER — MELOXICAM 15 MG/1
15 TABLET ORAL DAILY
Qty: 25 | Refills: 0 | Status: ACTIVE | COMMUNITY
Start: 2023-05-31 | End: 1900-01-01

## 2023-05-31 RX ORDER — GABAPENTIN 300 MG/1
300 CAPSULE ORAL 3 TIMES DAILY
Qty: 60 | Refills: 0 | Status: ACTIVE | COMMUNITY
Start: 2023-05-31 | End: 1900-01-01

## 2023-05-31 NOTE — HISTORY OF PRESENT ILLNESS
[de-identified] : 57 year old male who presents for follow-up evaluation about 6.5 months out from surgery. Today, the patient reports he is walking daily. He reports he is still experiencing issues with his RT hand but is still able to utilize it for day to day activities with minor complications/difficulties. Patient reports he is still experiencing minor pain and discomfort specifically at night in his LT lower leg. \par \par 02/27/2023\par Today the patient presents approximately 4 months out from surgery.  Overall he has done remarkably well in terms of his gait and overall functionality.  Prior to surgery he was unable to walk.  He also had substantial issues with movement of his hand.  He states that he is slowly getting more strength and functionality in his hands.  He is now walking with a cane.  Overall he feels like he is improving clinically.  The seroma that he had after surgery has been successfully treated with an IR placed drain.  That drain has been removed.\par \par 1/23\par Today the patient is now approximately 3 months out from surgery.  Overall he is unremarkably well.  He feels like his hand dexterity is improved.  He is walking much better.  He is still dealing with a sterile seroma which is being followed by plastics.  He denies any bowel bladder issues.  He denies any saddle anesthesia.  He denies any fevers or chills.  Overall he is pleased with his recovery to date.\par \par 12/14/22\par This is a 57-year-old male who is now approximately 5 weeks out from surgery.  Overall he has done well.  He still notes improvements in his hand dexterity and balance.  He does have some mid back pain.  He denies any radicular pain.  He is anxious to start physical therapy to help with his activity level.\par \par 11/30/22\par This is a 57-year-old male that is now approximately 22 days out from a C2-T2 posterior cervical spinal fusion.  Overall he has done remarkably well.  He is walking.  His hand strength has improved.  He feels more steady on his feet.  He denies any issues in terms of bowel bladder function or saddle anesthesia.  Overall he has already noted a significant improvement in his ability to perform his activities of daily living as well.

## 2023-05-31 NOTE — PHYSICAL EXAM
[de-identified] : Cervical Physical Exam\par \par Gait -slightly antalgic, but the patient was easily able to walk approximately 50 feet in the office today with a cane.\par \par Station - Normal\par \par Sagittal balance - Normal \par \par Compensatory mechanism? - None\par \par Horizontal gaze - Maintained\par \par \par Reflexes\par Biceps -hyperreflexic\par Triceps - Normal\par Brachioradialis -hyperreflexic\par Patellar - Normal\par Gastroc - Normal\par \par Shelton´s -positive\par \par Shoulder Exam - Normal\par \par Spurling´s - None\par \par Wrist Pulses -2+ radial/ulnar\par \par Foot Pulses -2+ DP/PT\par \par Cervical range of motion -decreased but can turn had approximately 15 degrees bilaterally\par \par Sensation \par C5-T1 sensation intact to light touch bilaterally\par \par L1-S1 sensation intact to light touch bilaterally\par \par Motor\par \par \par 	Deltoid	Biceps	Triceps	WF	WE	IO	\par Right	5/5	5/5	5/5	5/5	5/5	4/5	4/5\par Left	5/5	5/5	5/5	5/5	5/5	5/5	5/5\par \par As compared to prior to surgery the patient's overall exam has dramatically improved\par \par 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

## 2023-05-31 NOTE — ADDENDUM
[FreeTextEntry1] : I, Krysta Jones, acted solely as a scribe for Dr. Marco Casillas MD on this date 05/31/2023  \par \par All medical record entries made by the Scribe were at my, Dr. Marco Casillas MD., direction and personally dictated by me on 05/31/2023 . I have reviewed the chart and agree that the record accurately reflects my personal performance of the history, physical exam, assessment and plan. I have also personally directed, reviewed, and agreed with the chart.

## 2023-05-31 NOTE — ASSESSMENT
[FreeTextEntry1] : I had a long discussion today with the patient.  Overall he has done remarkably well I would encourage him to continue with physical therapy, Occupational Therapy to maximize improvement after surgery. I would like to transition him off the muscle relaxer and start a regimen of gabapentin, Mobic and Tylenol.  I will see him back in approximately 4 weeks.  I encouraged him to reach out to me directly at any point if his symptoms worsen or change in any way. A referral was provided to Dr. Aldana to have his RT hand sxs evaluated.

## 2023-06-07 ENCOUNTER — APPOINTMENT (OUTPATIENT)
Dept: ORTHOPEDIC SURGERY | Facility: CLINIC | Age: 58
End: 2023-06-07

## 2023-06-28 ENCOUNTER — APPOINTMENT (OUTPATIENT)
Dept: ORTHOPEDIC SURGERY | Facility: CLINIC | Age: 58
End: 2023-06-28
Payer: COMMERCIAL

## 2023-06-28 VITALS
DIASTOLIC BLOOD PRESSURE: 64 MMHG | BODY MASS INDEX: 23.32 KG/M2 | HEIGHT: 65 IN | WEIGHT: 140 LBS | SYSTOLIC BLOOD PRESSURE: 100 MMHG

## 2023-06-28 PROCEDURE — 99215 OFFICE O/P EST HI 40 MIN: CPT

## 2023-07-02 NOTE — PHYSICAL EXAM
[de-identified] : Cervical Physical Exam\par \par Gait -antalgic, the patient is walking with a cane, severe antalgic gait\par \par Station - Normal\par \par Sagittal balance - Normal \par \par Compensatory mechanism? - None\par \par Horizontal gaze - Maintained\par \par \par Reflexes\par Biceps -hyperreflexic\par Triceps - Normal\par Brachioradialis -hyperreflexic\par Patellar - Normal\par Gastroc - Normal\par \par Shelton´s -positive\par \par Shoulder Exam - Normal\par \par Spurling´s - None\par \par Wrist Pulses -2+ radial/ulnar\par \par Foot Pulses -2+ DP/PT\par \par Cervical range of motion -decreased but can turn had approximately 15 degrees bilaterally, see below\par \par Sensation \par C5-T1 sensation intact to light touch bilaterally\par \par L1-S1 sensation intact to light touch bilaterally\par \par Motor\par \par \par 	Deltoid	Biceps	Triceps	WF	WE	IO	\par Right	5/5	5/5	5/5	5/5	3/5	3/5	3/5\par Left	5/5	5/5	5/5	5/5	4/5	3/5	3/5\par \par As compared to prior to surgery the patient's overall exam has dramatically improved\par \par 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  Muscle spasm bilaterally and symmetric distribution along the trapezius, severe\par Flexion restricted to approximately 10 degrees extend, extension approximately 10 degrees.  Right lateral rotation restricted to approximately 20 degrees, left lateral rotation restricted to approximately 20 degrees.\par \par Muscle strength as above. [de-identified] : Cervical radiographs\par Hardware in appropriate position\par No acute complication\par No motion on flexion-extension views

## 2023-07-02 NOTE — HISTORY OF PRESENT ILLNESS
[de-identified] : 57 year old male who presents for follow-up evaluation about 8 months out from surgery. Today, the patient reports he is walking daily. Presents today with a cane. He reports he is still experiencing issues with his RT hand but is still able to utilize it for day to day activities with minor complications/difficulties. Patient reports he is still experiencing minor pain and discomfort specifically at night in his LT lower leg.  He does endorse issues with bilateral upper extremity usage of his hands.  He is still noting significant discomfort and disability with grasping objects, balance.\par \par 02/27/2023\par Today the patient presents approximately 4 months out from surgery.  Overall he has done remarkably well in terms of his gait and overall functionality.  Prior to surgery he was unable to walk.  He also had substantial issues with movement of his hand.  He states that he is slowly getting more strength and functionality in his hands.  He is now walking with a cane.  Overall he feels like he is improving clinically.  The seroma that he had after surgery has been successfully treated with an IR placed drain.  That drain has been removed.\par \par 1/23\par Today the patient is now approximately 3 months out from surgery.  Overall he is unremarkably well.  He feels like his hand dexterity is improved.  He is walking much better.  He is still dealing with a sterile seroma which is being followed by plastics.  He denies any bowel bladder issues.  He denies any saddle anesthesia.  He denies any fevers or chills.  Overall he is pleased with his recovery to date.\par \par 12/14/22\par This is a 57-year-old male who is now approximately 5 weeks out from surgery.  Overall he has done well.  He still notes improvements in his hand dexterity and balance.  He does have some mid back pain.  He denies any radicular pain.  He is anxious to start physical therapy to help with his activity level.\par \par 11/30/22\par This is a 57-year-old male that is now approximately 22 days out from a C2-T2 posterior cervical spinal fusion.  Overall he has done remarkably well.  He is walking.  His hand strength has improved.  He feels more steady on his feet.  He denies any issues in terms of bowel bladder function or saddle anesthesia.  Overall he has already noted a significant improvement in his ability to perform his activities of daily living as well.

## 2023-07-02 NOTE — ASSESSMENT
Chief complaint:   Chief Complaint   Patient presents with   • Hypertension     follow up       Vitals:  Visit Vitals  /86   Ht 6' (1.829 m)   Wt 105.2 kg   BMI 31.46 kg/m²       HISTORY OF PRESENT ILLNESS     Hypertension   This is a chronic problem. The current episode started more than 1 year ago. The problem is unchanged. The problem is controlled.   Abdominal Cramping         Other significant problems:  Patient Active Problem List    Diagnosis Date Noted   • HTN (hypertension), benign 03/10/2017     Priority: Low   • Vitamin D deficiency 09/28/2016     Priority: Low     9/2016:   25.4     • Pure hypercholesterolemia 09/27/2016     Priority: Low     9/2016:  Reports being off and on Lipitor.  On consistently for 5 years.  Also takes Fish Oil.  Reports family history of elevated lipids.     • Anxiety 09/27/2016     Priority: Low     9/2016:  Reports diagnosed in 2011.  Sees psychiatry.  On Lexapro with good results.     • Back problem      Priority: Low     9/2016:  Chronic back pain.  Reports herniated discs.  Takes cyclobenzaprine as needed.     • Mitral valve prolapse      Priority: Low   • Arthritis of lumbar spine (CMS/HCC)      Priority: Low       PAST MEDICAL, FAMILY AND SOCIAL HISTORY     Medications:  Current Outpatient Prescriptions   Medication   • lisinopril (ZESTRIL) 10 MG tablet   • atorvastatin (LIPITOR) 10 MG tablet   • metoPROLOL (LOPRESSOR) 25 MG tablet   • escitalopram (LEXAPRO) 10 MG tablet   • Omega-3 Fatty Acids (FISH OIL PO)     No current facility-administered medications for this visit.        Allergies:  ALLERGIES:  No Known Allergies    Past Medical  History/Surgeries:  Past Medical History:   Diagnosis Date   • Arthritis of lumbar spine (CMS/HCC)    • Back problem    • Mitral valve prolapse        Past Surgical History:   Procedure Laterality Date   • APPENDECTOMY     • US PROSTATE W BIOPSY      negative       Family History:  Family History   Problem Relation Age of Onset   •  [FreeTextEntry1] : I had a long discussion today with the patient.  In order to develop his level of disability I will be using December 2012 guidelines in order to determine MMI.  For the cervical spine we will start with table 11.2 given the fact that this is a surgical issue.  We will begin with table 11.4.  The patient does have significant disc abnormalities that displace nerve tissue including his spinal cord.  Therefore he gets 16 for imaging on a table 11.4.  He does have residual radicular pain especially down his right leg 6 months after surgery.  This is another 6 points. The patient has objective weakness findings.  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  The patient's fingers are locked in a flexion contracture.  Using table 11.5 he does have bilateral severe weakness involving C8 and T1 distributions, therefore he gets bilateral 12s at C8 and T1 bilaterally making his overall point total 48 using table 11.5..  The patient does have significant alteration of his sensation over his bilateral fingertips, only sensate light touch.  He has diminished sensation at C6-C7 and C8 bilaterally which makes him have 16 points x2 or 32 points from sensory dysfunction as well.  This is using table 11.5 again..  The patient does have significant pathologic reflexes consistent with myelopathy, therefore he does get another 4 for reflexes.  His overall point total is 110, making his severity ranking of disability a level H.\par \par Please note the patient will continue to try to work with physical therapy and Occupational Therapy to improve his basic activities of daily living.  I counseled him on the long-term nature of his diagnosis and the patient was in agreement. COPD Mother    • High cholesterol Mother    • Heart disease Mother    • Heart disease Maternal Grandfather    • Heart disease Paternal Grandmother    • High cholesterol Father    • High cholesterol Sister    • High cholesterol Brother    • Hearing loss Maternal Uncle      heart failure   • High cholesterol Brother    • High cholesterol Brother        Social History:  Social History   Substance Use Topics   • Smoking status: Never Smoker   • Smokeless tobacco: Never Used   • Alcohol use 3.6 - 4.2 oz/week     6 - 7 Standard drinks or equivalent per week       REVIEW OF SYSTEMS     Review of Systems   Constitutional: Negative.    HENT: Negative.    Eyes: Negative.    Respiratory: Negative.    Cardiovascular: Negative.    Gastrointestinal: Negative.    Genitourinary: Negative.    Musculoskeletal: Negative.    Skin: Negative.    Neurological: Negative.    Psychiatric/Behavioral: Negative.        PHYSICAL EXAM     Physical Exam   Constitutional: He is oriented to person, place, and time. He appears well-developed and well-nourished.   HENT:   Head: Normocephalic and atraumatic.   Right Ear: External ear normal.   Left Ear: External ear normal.   Nose: Nose normal.   Mouth/Throat: Oropharynx is clear and moist.   Eyes: Conjunctivae are normal. Pupils are equal, round, and reactive to light.   Neck: Normal range of motion. Neck supple. No thyromegaly present.   Cardiovascular: Normal rate, regular rhythm, normal heart sounds and intact distal pulses.  Exam reveals no gallop and no friction rub.    No murmur heard.  Pulmonary/Chest: Effort normal and breath sounds normal. No respiratory distress. He has no wheezes. He has no rales. He exhibits no tenderness.   Abdominal: Soft. Bowel sounds are normal. He exhibits no distension. There is no tenderness.   Musculoskeletal: Normal range of motion.   Neurological: He is alert and oriented to person, place, and time. He has normal reflexes.   Skin: Skin is warm and dry.    Psychiatric: He has a normal mood and affect. His behavior is normal. Judgment and thought content normal.       ASSESSMENT/PLAN     Keenan was seen today for hypertension.    Diagnoses and all orders for this visit:    HTN (hypertension), benign    Pure hypercholesterolemia    Arthritis of lumbar spine (CMS/HCC)

## 2023-08-09 ENCOUNTER — APPOINTMENT (OUTPATIENT)
Dept: ORTHOPEDIC SURGERY | Facility: CLINIC | Age: 58
End: 2023-08-09
Payer: COMMERCIAL

## 2023-08-09 PROCEDURE — 99214 OFFICE O/P EST MOD 30 MIN: CPT

## 2023-08-09 NOTE — ADDENDUM
[FreeTextEntry1] : I, Krysta Jones, acted solely as a scribe for Dr. Marco Casillas MD on this date 08/09/2023    All medical record entries made by the Scribe were at my, Dr. Marco Casillas MD., direction and personally dictated by me on 08/09/2023 . I have reviewed the chart and agree that the record accurately reflects my personal performance of the history, physical exam, assessment and plan. I have also personally directed, reviewed, and agreed with the chart.

## 2023-08-09 NOTE — PHYSICAL EXAM
[de-identified] : Cervical Physical Exam  Gait -antalgic, the patient is walking with a cane, severe antalgic gait  Station - Normal  Sagittal balance - Normal   Compensatory mechanism? - None  Horizontal gaze - Maintained   Reflexes Biceps -hyperreflexic Triceps - Normal Brachioradialis -hyperreflexic Patellar - Normal Gastroc - Normal  Hoffmans -positive  Shoulder Exam - Normal  Spurlings - None  Wrist Pulses -2+ radial/ulnar  Foot Pulses -2+ DP/PT  Cervical range of motion -decreased but can turn had approximately 15 degrees bilaterally, see below  Sensation  C5-T1 sensation intact to light touch bilaterally  L1-S1 sensation intact to light touch bilaterally  Motor   	Deltoid	Biceps	Triceps	WF	WE	IO	 Right	5/5	5/5	5/5	5/5	3/5	3/5	3/5 Left	5/5	5/5	5/5	5/5	4/5	3/5	3/5  As compared to prior to surgery the patient's overall exam has dramatically improved  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  Muscle spasm bilaterally and symmetric distribution along the trapezius, severe Flexion restricted to approximately 10 degrees extend, extension approximately 10 degrees.  Right lateral rotation restricted to approximately 20 degrees, left lateral rotation restricted to approximately 20 degrees.  Muscle strength as above. [de-identified] : Cervical radiographs\par  Hardware in appropriate position\par  No acute complication\par  No motion on flexion-extension views

## 2023-08-09 NOTE — HISTORY OF PRESENT ILLNESS
[de-identified] : 57 year old male who presents for follow-up evaluation about 9 months out from surgery. He reports he is experiencing a sharp pain that radiates to the back of his head. He reports not taking any pain medication for this pain. He reports improvement in his . Denies any worsening sxs.   06/28/2023 57 year old male who presents for follow-up evaluation about 8 months out from surgery. Today, the patient reports he is walking daily. Presents today with a cane. He reports he is still experiencing issues with his RT hand but is still able to utilize it for day to day activities with minor complications/difficulties. Patient reports he is still experiencing minor pain and discomfort specifically at night in his LT lower leg.  He does endorse issues with bilateral upper extremity usage of his hands.  He is still noting significant discomfort and disability with grasping objects, balance.  02/27/2023 Today the patient presents approximately 4 months out from surgery.  Overall he has done remarkably well in terms of his gait and overall functionality.  Prior to surgery he was unable to walk.  He also had substantial issues with movement of his hand.  He states that he is slowly getting more strength and functionality in his hands.  He is now walking with a cane.  Overall he feels like he is improving clinically.  The seroma that he had after surgery has been successfully treated with an IR placed drain.  That drain has been removed.  1/23 Today the patient is now approximately 3 months out from surgery.  Overall he is unremarkably well.  He feels like his hand dexterity is improved.  He is walking much better.  He is still dealing with a sterile seroma which is being followed by plastics.  He denies any bowel bladder issues.  He denies any saddle anesthesia.  He denies any fevers or chills.  Overall he is pleased with his recovery to date.  12/14/22 This is a 57-year-old male who is now approximately 5 weeks out from surgery.  Overall he has done well.  He still notes improvements in his hand dexterity and balance.  He does have some mid back pain.  He denies any radicular pain.  He is anxious to start physical therapy to help with his activity level.  11/30/22 This is a 57-year-old male that is now approximately 22 days out from a C2-T2 posterior cervical spinal fusion.  Overall he has done remarkably well.  He is walking.  His hand strength has improved.  He feels more steady on his feet.  He denies any issues in terms of bowel bladder function or saddle anesthesia.  Overall he has already noted a significant improvement in his ability to perform his activities of daily living as well.

## 2023-08-09 NOTE — ASSESSMENT
[FreeTextEntry1] : I had a long discussion today with the patient.  In order to develop his level of disability I will be using December 2012 guidelines in order to determine MMI.  For the cervical spine we will start with table 11.2 given the fact that this is a surgical issue.  We will begin with table 11.4.  The patient does have significant disc abnormalities that displace nerve tissue including his spinal cord.  Therefore he gets 16 for imaging on a table 11.4.  He does have residual radicular pain especially down his right leg 6 months after surgery.  This is another 6 points. The patient has objective weakness findings.  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  The patient's fingers are locked in a flexion contracture.  Using table 11.5 he does have bilateral severe weakness involving C8 and T1 distributions, therefore he gets bilateral 12s at C8 and T1 bilaterally making his overall point total 48 using table 11.5..  The patient does have significant alteration of his sensation over his bilateral fingertips, only sensate light touch.  He has diminished sensation at C6-C7 and C8 bilaterally which makes him have 16 points x2 or 32 points from sensory dysfunction as well.  This is using table 11.5 again..  The patient does have significant pathologic reflexes consistent with myelopathy, therefore he does get another 4 for reflexes.  His overall point total is 110, making his severity ranking of disability a level H.  Please note the patient will continue to try to work with physical therapy and Occupational Therapy to improve his basic activities of daily living.  I counseled him on the long-term nature of his diagnosis and the patient was in agreement.

## 2023-08-18 NOTE — OCCUPATIONAL THERAPY INITIAL EVALUATION ADULT - LEVEL OF INDEPENDENCE: BED TO CHAIR, REHAB EVAL
[Dear  ___] : Dear  [unfilled], [Consult Letter:] : I had the pleasure of evaluating your patient, [unfilled]. [Please see my note below.] : Please see my note below. [Consult Closing:] : Thank you very much for allowing me to participate in the care of this patient.  If you have any questions, please do not hesitate to contact me. [Sincerely,] : Sincerely, [FreeTextEntry3] : Yogi Awad MD, FCCP, D. [facility] Pul[facility] and [facility] at [place]  contact guard

## 2023-09-20 ENCOUNTER — APPOINTMENT (OUTPATIENT)
Dept: ORTHOPEDIC SURGERY | Facility: CLINIC | Age: 58
End: 2023-09-20
Payer: COMMERCIAL

## 2023-09-20 VITALS
WEIGHT: 140 LBS | SYSTOLIC BLOOD PRESSURE: 111 MMHG | HEIGHT: 65 IN | BODY MASS INDEX: 23.32 KG/M2 | DIASTOLIC BLOOD PRESSURE: 56 MMHG

## 2023-09-20 PROCEDURE — 99214 OFFICE O/P EST MOD 30 MIN: CPT

## 2023-09-20 PROCEDURE — 72050 X-RAY EXAM NECK SPINE 4/5VWS: CPT

## 2023-09-20 RX ORDER — TIZANIDINE 2 MG/1
2 TABLET ORAL EVERY 6 HOURS
Qty: 56 | Refills: 0 | Status: ACTIVE | COMMUNITY
Start: 2023-09-20 | End: 1900-01-01

## 2023-11-10 ENCOUNTER — APPOINTMENT (OUTPATIENT)
Dept: ORTHOPEDIC SURGERY | Facility: CLINIC | Age: 58
End: 2023-11-10
Payer: COMMERCIAL

## 2023-11-10 VITALS
DIASTOLIC BLOOD PRESSURE: 70 MMHG | WEIGHT: 140 LBS | HEIGHT: 65 IN | BODY MASS INDEX: 23.32 KG/M2 | SYSTOLIC BLOOD PRESSURE: 119 MMHG

## 2023-11-10 PROCEDURE — 99214 OFFICE O/P EST MOD 30 MIN: CPT

## 2023-11-16 NOTE — H&P ADULT - TIME BILLING
0 (no pain/absence of nonverbal indicators of pain) Discussed treatment plan with patient at bedside.   I am a non participating [employer] physician seeing Pt in coverage for Dr Rubio Discussed treatment plan with patient at bedside.   I am a non participating [employer] physician seeing Pt in coverage for Dr Rubio. The above patient documentation by Dr. Hurtado was reviewed and I agree with his evaluation, assessment and treatment plan.  Dion Rubio M.D.

## 2023-12-07 NOTE — DIETITIAN INITIAL EVALUATION ADULT - ORAL INTAKE PTA/DIET HISTORY
Subjective:     Chief Complaint   Patient presents with    GI Problem     X 1 week on and off gassy           HPI:   Mikal presents today with Stomach issues. For the last week she has been very gassy, bloated.     Trying to eat smaller meals, less spicy foods. This seems to be particularly after eating. Within 15-20 minutes. More belching as well.   Cut out spicy food and had smaller meals and this seems a lot better.   Lying down seems to help too, but standing makes pain worse.     No bowel habit changes with this. Pain was in the center, epigastric. Was burning a bit, alkaseltzer helped a bit as well.     This has been on and off in the past for a couple years. For the past 2-3 days pain has been gone.     No h/o stomach ulcer or cancer in the family.     Occasional blood with wiping. Sometimes uses suppository for this.     In May in [place] she did some sort of scan and has some questions about results.   CT of the chest did note a few nodules 1 in the right upper lobe and 1 in the left lung.  It was recommended to review these annually.  There was also a lumbar vertebral scan which showed a t-value of -1.3.  Neck x-ray did show cervical spondylosis.  Echo was done as well which was largely normal but did show bilateral vertebral artery diameter being thin.  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  No other concerns noted.  She does not get a lot of protein in general. Does like fish, just doesn't get much.       Current Outpatient Medications Ordered in Epic   Medication Sig Dispense Refill    levothyroxine (SYNTHROID) 25 MCG Tab Take 0.5 Tablets by mouth every morning on an empty stomach. 90 Tablet 1    valACYclovir (VALTREX) 500 MG Tab Take 1 Tablet by mouth 2 times a day. (Patient not taking: Reported on 12/7/2023) 40 Tablet 3    clotrimazole-betamethasone (LOTRISONE) 1-0.05 % Cream Apply to  "rash BID until clear (Patient not taking: Reported on 9/20/2023) 60 g 2     No current Epic-ordered facility-administered medications on file.         ROS:  Gen: no fevers/chills, no changes in weight  Eyes: no changes in vision  ENT: no sore throat, no hearing loss, no bloody nose  Pulm: no sob, no cough  CV: no chest pain, no palpitations  GI: no nausea/vomiting, no diarrhea  : no dysuria  MSk: no myalgias  Skin: no rash  Neuro: no headaches, no numbness/tingling  Heme/Lymph: no easy bruising      Objective:     Exam:  BP 96/60 (BP Location: Left arm, Patient Position: Sitting, BP Cuff Size: Adult)   Pulse 76   Temp 36.7 °C (98 °F)   Resp 12   Ht 1.626 m (5' 4\")   Wt 45.4 kg (100 lb)   LMP 04/23/2017   SpO2 96%   BMI 17.16 kg/m²  Body mass index is 17.16 kg/m².    Gen: AAOx3, NAD, well appearing  HEENT: NCAT, EOMI, Nares patent, Mucosa moist  Resp: Normal chest wall rise and fall, not SOB, no tachypnea  Skin: no rash or abnormality of visible skin.   Psych: normal speech, not slurred, good insight, affect full  MSK: Moves all four limbs equally and normally, gait normal        Assessment & Plan:     53 y.o. female with the following -     1. Need for vaccination    - INFLUENZA VACCINE QUAD INJ (PF)    2. Epigastric pain  Discussed referral to GI for an EGD.  She does report this was ordered for her in the past but she got nervous and never did the procedure.  I do think this to be beneficial given the recurrence of these issues.  Also due to ethnicity she could be at higher risk for stomach cancers or ulcers.  She will go ahead and get this done.  - Referral to Gastroenterology    3. Cervical spondylosis  We did discuss imaging results from a cervical spine x-ray.  She does report some neck pain at times.  Will get her referred to physical therapy.  - Referral to Physical Therapy    4. Lung nodule seen on imaging study  Lung nodule seen on CT scan done in [place].  Will get this repeat at a year which " would be May 2024.    5. Osteopenia of lumbar spine  We did discuss osteopenia seen on DEXA done in [place].  Discussed important things for bone density including regular weightbearing exercise as well as strength training for her lumbar spine.  We did discuss getting enough protein in the diet and also calcium through diet.  She does admit to not much dairy and possibly needing more protein as well.  We did discuss different protein sources.          No follow-ups on file.    Please note that this dictation was created using voice recognition software. I have made every reasonable attempt to correct obvious errors, but I expect that there are errors of grammar and possibly content that I did not discover before finalizing the note.         Chart reviewed, events noted. Pt resting in bed with neck collar in place. Reports good appetite PTA, no issues chewing/swallowing. NKFA. Does not eat beef or pork. Reports  lb. Denies any wt changes PTA.

## 2024-02-07 ENCOUNTER — APPOINTMENT (OUTPATIENT)
Dept: ORTHOPEDIC SURGERY | Facility: CLINIC | Age: 59
End: 2024-02-07
Payer: COMMERCIAL

## 2024-02-07 DIAGNOSIS — M54.2 CERVICALGIA: ICD-10-CM

## 2024-02-07 PROCEDURE — 99214 OFFICE O/P EST MOD 30 MIN: CPT

## 2024-02-07 RX ORDER — GABAPENTIN 100 MG/1
100 CAPSULE ORAL TWICE DAILY
Qty: 180 | Refills: 0 | Status: ACTIVE | COMMUNITY
Start: 2024-02-07 | End: 1900-01-01

## 2024-02-07 RX ORDER — TIZANIDINE 2 MG/1
2 TABLET ORAL EVERY 6 HOURS
Qty: 56 | Refills: 1 | Status: ACTIVE | COMMUNITY
Start: 2024-02-07 | End: 1900-01-01

## 2024-02-07 RX ORDER — MELOXICAM 15 MG/1
15 TABLET ORAL DAILY
Qty: 14 | Refills: 0 | Status: ACTIVE | COMMUNITY
Start: 2024-02-07 | End: 1900-01-01

## 2024-02-07 NOTE — ADDENDUM
[FreeTextEntry1] :  I, Sandy Baires, acted solely as a scribe for Dr. Marco Casillas MD on this date 02/07/2024   All medical record entries made by the Scribe were at my, Dr. Marco Casillas MD., direction and personally dictated by me on 02/07/2024. I have reviewed the chart and agree that the record accurately reflects my personal performance of the history, physical exam, assessment and plan. I have also personally directed, reviewed, and agreed with the chart.

## 2024-02-07 NOTE — ASSESSMENT
[FreeTextEntry1] : This is 58-year-old male s/p 11 months out from surgery. I had a lengthy discussion with the patient in regard to treatment plan and diagnosis. There are no red flag findings on imaging nor are there any red flag findings on clinical exams. Therefore, we will proceed with a course of conservative treatment. This would include continuing to walk as much as possible. Rx for cane provided. Wrist brace provided for comfort. The patient can take Meloxicam, Gabapentin, Tizanidine, Tylenol, NSAIDs as medically indicated. The patient will follow up with me in approximately 4 to 6 weeks. I encouraged the patient to follow-up sooner if there are any new or worsening symptoms.

## 2024-02-07 NOTE — HISTORY OF PRESENT ILLNESS
[de-identified] : Patient is a 58 year old male who presents for f/u eval s/p 14 months out from surgery. The patient details acute pain in posterior neck, resolved with topical cream & massage. Continuing to ambulate with cane.   11.10.23 PHILOMENA HUGO is a 58-year-old male who presents for follow-up evaluation s/p 11 months out from surgery. He reports he is still experiencing exacerbation of sxs with stiffness over his LT shoulder. He is still having baseline issues with ADLs given his severe case of myelopathy.  09/20/2023 58 year old male who presents for follow-up evaluation of about 10.5 months out from surgery. Today, the patient reports he feels a little better relative to his previous visit. He reports he is still experiencing exacerbation of sxs with stiffness over his LT shoulder.   08/09/2023 57 year old male who presents for follow-up evaluation about 9 months out from surgery. He reports he is experiencing a sharp pain that radiates to the back of his head. He reports not taking any pain medication for this pain. He reports improvement in his . Denies any worsening sxs.   06/28/2023 57 year old male who presents for follow-up evaluation about 8 months out from surgery. Today, the patient reports he is walking daily. Presents today with a cane. He reports he is still experiencing issues with his RT hand but is still able to utilize it for day to day activities with minor complications/difficulties. Patient reports he is still experiencing minor pain and discomfort specifically at night in his LT lower leg.  He does endorse issues with bilateral upper extremity usage of his hands.  He is still noting significant discomfort and disability with grasping objects, balance.  02/27/2023 Today the patient presents approximately 4 months out from surgery.  Overall he has done remarkably well in terms of his gait and overall functionality.  Prior to surgery he was unable to walk.  He also had substantial issues with movement of his hand.  He states that he is slowly getting more strength and functionality in his hands.  He is now walking with a cane.  Overall he feels like he is improving clinically.  The seroma that he had after surgery has been successfully treated with an IR placed drain.  That drain has been removed.  1/23 Today the patient is now approximately 3 months out from surgery.  Overall he is unremarkably well.  He feels like his hand dexterity is improved.  He is walking much better.  He is still dealing with a sterile seroma which is being followed by plastics.  He denies any bowel bladder issues.  He denies any saddle anesthesia.  He denies any fevers or chills.  Overall he is pleased with his recovery to date.  12/14/22 This is a 57-year-old male who is now approximately 5 weeks out from surgery.  Overall he has done well.  He still notes improvements in his hand dexterity and balance.  He does have some mid back pain.  He denies any radicular pain.  He is anxious to start physical therapy to help with his activity level.  11/30/22 This is a 57-year-old male that is now approximately 22 days out from a C2-T2 posterior cervical spinal fusion.  Overall he has done remarkably well.  He is walking.  His hand strength has improved.  He feels more steady on his feet.  He denies any issues in terms of bowel bladder function or saddle anesthesia.  Overall he has already noted a significant improvement in his ability to perform his activities of daily living as well.
Normal

## 2024-02-07 NOTE — PHYSICAL EXAM
[de-identified] : Cervical Physical Exam  Gait -antalgic, the patient is walking with a cane, severe antalgic gait  Station - Normal  Sagittal balance - Normal   Compensatory mechanism? - None  Horizontal gaze - Maintained   Reflexes Biceps -hyperreflexic Triceps - Normal Brachioradialis -hyperreflexic Patellar - Normal Gastroc - Normal  Hoffmans -positive  Shoulder Exam - Normal  Spurlings - None  Wrist Pulses -2+ radial/ulnar  Foot Pulses -2+ DP/PT  Cervical range of motion -decreased but can turn had approximately 15 degrees bilaterally, see below  Sensation  C5-T1 sensation intact to light touch bilaterally  L1-S1 sensation intact to light touch bilaterally  Motor   	Deltoid	Biceps	Triceps	WF	WE	IO	 Right	5/5	5/5	5/5	5/5	3/5	3/5	3/5 Left	5/5	5/5	5/5	5/5	4/5	3/5	3/5  As compared to prior to surgery the patient's overall exam has dramatically improved  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  Muscle spasm bilaterally and symmetric distribution along the trapezius, severe Flexion restricted to approximately 10 degrees extend, extension approximately 10 degrees.  Right lateral rotation restricted to approximately 20 degrees, left lateral rotation restricted to approximately 20 degrees.  Muscle strength as above. [de-identified] : Cervical radiographs\par  Hardware in appropriate position\par  No acute complication\par  No motion on flexion-extension views

## 2024-05-07 PROBLEM — M21.519 CLAW HAND: Status: ACTIVE | Noted: 2023-02-14

## 2024-05-07 PROBLEM — M54.2 CERVICAL PAIN (NECK): Status: ACTIVE | Noted: 2023-01-10

## 2024-05-07 NOTE — REASON FOR VISIT
[Follow-Up Visit] : a follow-up visit for [Cervical Myelopathy/Myopathy] : cervical myelopathy/myopathy

## 2024-05-08 ENCOUNTER — APPOINTMENT (OUTPATIENT)
Dept: ORTHOPEDIC SURGERY | Facility: CLINIC | Age: 59
End: 2024-05-08
Payer: COMMERCIAL

## 2024-05-08 DIAGNOSIS — M21.519: ICD-10-CM

## 2024-05-08 DIAGNOSIS — M54.2 CERVICALGIA: ICD-10-CM

## 2024-05-08 PROCEDURE — 99214 OFFICE O/P EST MOD 30 MIN: CPT

## 2024-05-08 NOTE — PHYSICAL EXAM
[de-identified] : Cervical Physical Exam  The patient's gait continues to improve.  He still walks with a cane  Station - Normal  Sagittal balance - Normal   Compensatory mechanism? - None  Horizontal gaze - Maintained   Reflexes Biceps -hyperreflexic Triceps - Normal Brachioradialis -hyperreflexic Patellar - Normal Gastroc - Normal  Hoffmans -positive  Shoulder Exam - Normal  Spurlings - None  Wrist Pulses -2+ radial/ulnar  Foot Pulses -2+ DP/PT  Cervical range of motion -decreased but can turn had approximately 15 degrees bilaterally, see below  Sensation  C5-T1 sensation intact to light touch bilaterally  L1-S1 sensation intact to light touch bilaterally  Motor   	Deltoid	Biceps	Triceps	WF	WE	IO	 Right	5/5	5/5	5/5	5/5	3/5	3/5	3/5 Left	5/5	5/5	5/5	5/5	4/5	3/5	3/5  As compared to prior to surgery the patient's overall exam has dramatically improved  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  Muscle spasm bilaterally and symmetric distribution along the trapezius, severe Flexion restricted to approximately 10 degrees extend, extension approximately 10 degrees.  Right lateral rotation restricted to approximately 20 degrees, left lateral rotation restricted to approximately 20 degrees.  Muscle strength as above. [de-identified] : Cervical radiographs\par  Hardware in appropriate position\par  No acute complication\par  No motion on flexion-extension views

## 2024-05-08 NOTE — HISTORY OF PRESENT ILLNESS
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  He has made tremendous progress since his initial surgery.  This includes his ability to walk.  He states currently at times he can walk without a cane.  He is still dealing with fairly substantial issues with hand dexterity related to his presenting symptoms of myelopathy.

## 2024-05-08 NOTE — ASSESSMENT
[FreeTextEntry1] : I had a long discussion today with the patient. In order to develop his level of disability I will be using December 2012 guidelines in order to determine MMI. For the cervical spine we will start with table 11.2 given the fact that this is a surgical issue. We will begin with table 11.4. The patient does have significant disc abnormalities that displace nerve tissue including his spinal cord. Therefore he gets 16 for imaging on a table 11.4. He does have residual radicular pain especially down his right leg 6 months after surgery. This is another 6 points. The patient has objective weakness findings. 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 The patient's fingers are locked in a flexion contracture. Using table 11.5 he does have bilateral severe weakness involving C8 and T1 distributions, therefore he gets bilateral 12s at C8 and T1 bilaterally making his overall point total 48 using table 11.5.. The patient does have significant alteration of his sensation over his bilateral fingertips, only sensate light touch. He has diminished sensation at C6-C7 and C8 bilaterally which makes him have 16 points x2 or 32 points from sensory dysfunction as well. This is using table 11.5 again. The patient does have significant pathologic reflexes consistent with myelopathy, therefore he does get another 4 for reflexes. His overall point total is 110, making his severity ranking of disability a level H.  The patient is severely, permanently,100% totally disabled.  Please note the patient will continue to try to work with physical therapy and Occupational Therapy to improve his basic activities of daily living. I counseled him on the long-term nature of his diagnosis and the patient was in agreement.  The patient will follow-up with me in 6 months or sooner if there are any new or worsening sxs. All questions were answered.

## 2024-10-09 ENCOUNTER — APPOINTMENT (OUTPATIENT)
Dept: ORTHOPEDIC SURGERY | Facility: CLINIC | Age: 59
End: 2024-10-09

## 2024-10-09 DIAGNOSIS — M54.2 CERVICALGIA: ICD-10-CM

## 2024-10-28 ENCOUNTER — APPOINTMENT (OUTPATIENT)
Dept: ORTHOPEDIC SURGERY | Facility: CLINIC | Age: 59
End: 2024-10-28
Payer: COMMERCIAL

## 2024-10-28 DIAGNOSIS — M54.2 CERVICALGIA: ICD-10-CM

## 2024-10-28 PROCEDURE — 99214 OFFICE O/P EST MOD 30 MIN: CPT

## 2024-10-28 RX ORDER — TIZANIDINE 2 MG/1
2 TABLET ORAL EVERY 6 HOURS
Qty: 56 | Refills: 1 | Status: ACTIVE | COMMUNITY
Start: 2024-10-28 | End: 1900-01-01

## 2024-10-28 RX ORDER — DICLOFENAC SODIUM 75 MG/1
75 TABLET, DELAYED RELEASE ORAL
Qty: 30 | Refills: 0 | Status: ACTIVE | COMMUNITY
Start: 2024-10-28 | End: 1900-01-01

## 2024-12-27 NOTE — DIETITIAN INITIAL EVALUATION ADULT - ORAL INTAKE PTA/DIET HISTORY
Patient seen for assessment. Reports good appetite PTA. Doesn't follow a diet at home. Assistance with ambulation/Assistance OOB with selected safe patient handling equipment/Communicate Risk of Fall with Harm to all staff/Reinforce activity limits and safety measures with patient and family/Tailored Fall Risk Interventions/Visual Cue: Yellow wristband and red socks/Bed in lowest position, wheels locked, appropriate side rails in place/Call bell, personal items and telephone in reach/Instruct patient to call for assistance before getting out of bed or chair/Non-slip footwear when patient is out of bed/[place] to call system/Physically safe environment - no spills, clutter or unnecessary equipment/Purposeful Proactive Rounding/Room/bathroom lighting operational, light cord in reach

## 2025-04-08 NOTE — ED ADULT TRIAGE NOTE - BP NONINVASIVE DIASTOLIC (MM HG)
Per physician orders, patient was given home sleep testing device and instructed on how to apply the device before going to bed tonight.  I sized the device and showed the patient using a mirror how the device fits and what it should look like so they can use a mirror when putting it on themselves at home.  We reviewed the instruction booklet and the number to call if they have any questions at night.  Patient understood and was instructed to return the device the next day back to the sleep lab.     
70

## 2025-04-28 ENCOUNTER — APPOINTMENT (OUTPATIENT)
Dept: ORTHOPEDIC SURGERY | Facility: CLINIC | Age: 60
End: 2025-04-28
Payer: COMMERCIAL

## 2025-04-28 DIAGNOSIS — M21.519: ICD-10-CM

## 2025-04-28 DIAGNOSIS — M54.2 CERVICALGIA: ICD-10-CM

## 2025-04-28 PROCEDURE — 99214 OFFICE O/P EST MOD 30 MIN: CPT

## 2025-04-28 RX ORDER — DICLOFENAC SODIUM 75 MG/1
75 TABLET, DELAYED RELEASE ORAL
Qty: 60 | Refills: 1 | Status: ACTIVE | COMMUNITY
Start: 2025-04-28 | End: 1900-01-01

## 2025-04-28 RX ORDER — TIZANIDINE 2 MG/1
2 TABLET ORAL
Qty: 30 | Refills: 1 | Status: ACTIVE | COMMUNITY
Start: 2025-04-28 | End: 1900-01-01

## 2025-04-29 NOTE — DISCHARGE NOTE PROVIDER - NSDCHHATTENDCERT_GEN_ALL_CORE
Patient is accepted for [facility].    Please schedule to start on the next available opening if patient calls back.    My signature below certifies that the above stated patient is homebound and upon completion of the Face-To-Face encounter, has the need for intermittent skilled nursing, physical therapy and/or speech or occupational therapy services in their home for their current diagnosis as outlined in their initial plan of care. These services will continue to be monitored by myself or another physician.

## 2025-05-21 ENCOUNTER — APPOINTMENT (OUTPATIENT)
Dept: ORTHOPEDIC SURGERY | Facility: CLINIC | Age: 60
End: 2025-05-21
Payer: COMMERCIAL

## 2025-05-21 DIAGNOSIS — M54.9 DORSALGIA, UNSPECIFIED: ICD-10-CM

## 2025-05-21 PROCEDURE — 99214 OFFICE O/P EST MOD 30 MIN: CPT

## 2025-06-23 RX ORDER — TIZANIDINE 2 MG/1
2 TABLET ORAL
Qty: 24 | Refills: 0 | Status: ACTIVE | COMMUNITY
Start: 2025-06-23 | End: 1900-01-01

## 2025-06-23 RX ORDER — DICLOFENAC SODIUM 75 MG/1
75 TABLET, DELAYED RELEASE ORAL
Qty: 60 | Refills: 0 | Status: ACTIVE | COMMUNITY
Start: 2025-06-23 | End: 1900-01-01

## 2025-07-21 ENCOUNTER — APPOINTMENT (OUTPATIENT)
Dept: ORTHOPEDIC SURGERY | Facility: CLINIC | Age: 60
End: 2025-07-21
Payer: COMMERCIAL

## 2025-07-21 DIAGNOSIS — M54.2 CERVICALGIA: ICD-10-CM

## 2025-07-21 PROCEDURE — 99214 OFFICE O/P EST MOD 30 MIN: CPT

## 2025-07-21 RX ORDER — DICLOFENAC SODIUM 75 MG/1
75 TABLET, DELAYED RELEASE ORAL
Qty: 30 | Refills: 1 | Status: ACTIVE | COMMUNITY
Start: 2025-07-21 | End: 1900-01-01

## (undated) DEVICE — DRSG AQUACEL 3.5 X 10"

## (undated) DEVICE — DRAPE EQUIPMENT BANDED BAG 30 X 30" (SHOWER CAP)

## (undated) DEVICE — MEDICATION LABELS W MARKER

## (undated) DEVICE — SOL IRR POUR NS 0.9% 500ML

## (undated) DEVICE — PREP BETADINE KIT

## (undated) DEVICE — GLV 7.5 PROTEXIS (WHITE)

## (undated) DEVICE — GLV 7 PROTEXIS (WHITE)

## (undated) DEVICE — FOLEY TRAY 16FR LF URINE METER SURESTEP

## (undated) DEVICE — SUT VICRYL 3-0 18" CP-2 UNDYED (POP-OFF)

## (undated) DEVICE — DRAIN CHANNEL 19FR ROUND FULL FLUTED

## (undated) DEVICE — DRAPE 1/2 SHEET 40X57"

## (undated) DEVICE — SOL IRR POUR H2O 250ML

## (undated) DEVICE — MIDAS REX MR8 BALL FLUTED SM BORE 4MM X 10CM

## (undated) DEVICE — DRAPE 3/4 SHEET W REINFORCEMENT 56X77"

## (undated) DEVICE — PREP CHLORAPREP HI-LITE ORANGE 26ML

## (undated) DEVICE — ELCTR SUBDERMAL NDL 27G X 1/2" WITH TWISTED PAIR

## (undated) DEVICE — DRAPE C ARM C-ARMOUR

## (undated) DEVICE — GOWN TRIMAX LG

## (undated) DEVICE — DRAPE EQUIPMENT COVER 27"

## (undated) DEVICE — ELCTR BOVIE TIP BLADE INSULATED 2.75" EDGE

## (undated) DEVICE — DRSG AQUACEL 3.5 X 12"

## (undated) DEVICE — DRAPE MAYO STAND 30"

## (undated) DEVICE — GOWN TRIMAX XXL

## (undated) DEVICE — ELCTR SUBDERMAL NDL CLASSIC 1.5M X 59" (6 COLOR)

## (undated) DEVICE — MIDAS REX MR8 MATCH HEAD FLUTED LG BORE 3MM X 14CM

## (undated) DEVICE — SUT MONOSOF 2-0 18" C-15

## (undated) DEVICE — Device

## (undated) DEVICE — LAP PAD 18 X 18"

## (undated) DEVICE — SUT QUILL POLYPROPYLENE 2 24CM 36MM

## (undated) DEVICE — GLV 8 PROTEXIS ORTHO (CREAM)

## (undated) DEVICE — SUT MONOCRYL 3-0 18" PS-2 UNDYED

## (undated) DEVICE — PACK LUMBAR LAMI

## (undated) DEVICE — SPHERE MARKER (5 SPHERES)

## (undated) DEVICE — POSITIONER FOAM EGG CRATE ULNAR 2PCS (PINK)

## (undated) DEVICE — SPECIMEN CONTAINER 100ML

## (undated) DEVICE — DRILL BIT K2 MEDICAL 2.3MM

## (undated) DEVICE — ELCTR PEDICLE SCREW PROBE 3MM BALL 1.8MM X 100MM

## (undated) DEVICE — STAPLER SKIN VISI-STAT 35 WIDE

## (undated) DEVICE — DRAIN JACKSON PRATT 3 SPRING RESERVOIR W 10FR PVC DRAIN

## (undated) DEVICE — DRSG CURITY GAUZE SPONGE 4 X 4" 12-PLY

## (undated) DEVICE — GLV 8.5 PROTEXIS (WHITE)

## (undated) DEVICE — DRSG DERMABOND PRINEO 60CM

## (undated) DEVICE — MIDAS REX MR7 LUBRICANT DIFFUSER CARTRIDGE

## (undated) DEVICE — ELCTR MONOPOLAR STIMULATOR PROBE FLUSH-TIP

## (undated) DEVICE — BIPOLAR FORCEP CODMAN VERSATRU 8" X 0.5MM (BLUE)

## (undated) DEVICE — DRAPE C ARM UNIVERSAL

## (undated) DEVICE — DRAPE TOWEL BLUE 17" X 24"

## (undated) DEVICE — PREP DURAPREP 26CC

## (undated) DEVICE — WARMING BLANKET LOWER ADULT

## (undated) DEVICE — ELCTR 4-DISC 20MM 49" (RED, BLUE, GREEN, BLACK)

## (undated) DEVICE — VENODYNE/SCD SLEEVE CALF LARGE

## (undated) DEVICE — COLLAR ASPEN TX UNIVERSAL

## (undated) DEVICE — ELCTR AQUAMANTYS BIPOLAR SEALER 6.0

## (undated) DEVICE — STRYKER BONE MILL BLADE FINE 3.2MM

## (undated) DEVICE — SUT VICRYL 0 18" OS-6 (POP-OFF)

## (undated) DEVICE — DRSG BIOPATCH DISK W CHG 1" W 4.0MM HOLE

## (undated) DEVICE — KIT CLOSED WOUND SUCTION 400 MED 1/8 10FR

## (undated) DEVICE — GLV 6.5 PROTEXIS (WHITE)

## (undated) DEVICE — GLV 8 PROTEXIS (WHITE)

## (undated) DEVICE — ELCTR BIPOLAR PROBE

## (undated) DEVICE — DRAPE SPLIT SHEET 77" X 108"

## (undated) DEVICE — VISITEC 4X4

## (undated) DEVICE — DRSG TEGADERM 4X4.75"

## (undated) DEVICE — ELCTR SUBDERMAL CORKSCREW NDL 1.2MM

## (undated) DEVICE — SUT VICRYL 2-0 18" CP-2 UNDYED (POP-OFF)

## (undated) DEVICE — MARKING PEN W RULER